# Patient Record
Sex: FEMALE | Race: WHITE | Employment: OTHER | URBAN - METROPOLITAN AREA
[De-identification: names, ages, dates, MRNs, and addresses within clinical notes are randomized per-mention and may not be internally consistent; named-entity substitution may affect disease eponyms.]

---

## 2017-05-22 ENCOUNTER — OFFICE VISIT (OUTPATIENT)
Dept: INTERNAL MEDICINE CLINIC | Facility: CLINIC | Age: 77
End: 2017-05-22

## 2017-05-22 VITALS
WEIGHT: 172 LBS | HEART RATE: 60 BPM | HEIGHT: 65 IN | TEMPERATURE: 98 F | RESPIRATION RATE: 16 BRPM | SYSTOLIC BLOOD PRESSURE: 110 MMHG | DIASTOLIC BLOOD PRESSURE: 50 MMHG | BODY MASS INDEX: 28.66 KG/M2

## 2017-05-22 DIAGNOSIS — Z00.00 ROUTINE GENERAL MEDICAL EXAMINATION AT A HEALTH CARE FACILITY: ICD-10-CM

## 2017-05-22 DIAGNOSIS — M79.10 MYALGIA: ICD-10-CM

## 2017-05-22 DIAGNOSIS — Z12.31 ENCOUNTER FOR SCREENING MAMMOGRAM FOR BREAST CANCER: ICD-10-CM

## 2017-05-22 DIAGNOSIS — M35.3 POLYMYALGIA RHEUMATICA SYNDROME (HCC): ICD-10-CM

## 2017-05-22 DIAGNOSIS — Z12.11 SPECIAL SCREENING FOR MALIGNANT NEOPLASMS, COLON: ICD-10-CM

## 2017-05-22 DIAGNOSIS — Z13.220 SCREENING FOR CHOLESTEROL LEVEL: ICD-10-CM

## 2017-05-22 DIAGNOSIS — Z78.0 POST-MENOPAUSAL: Primary | ICD-10-CM

## 2017-05-22 PROCEDURE — G0439 PPPS, SUBSEQ VISIT: HCPCS | Performed by: INTERNAL MEDICINE

## 2017-05-22 PROCEDURE — 90670 PCV13 VACCINE IM: CPT | Performed by: INTERNAL MEDICINE

## 2017-05-22 PROCEDURE — 96160 PT-FOCUSED HLTH RISK ASSMT: CPT | Performed by: INTERNAL MEDICINE

## 2017-05-22 PROCEDURE — G0009 ADMIN PNEUMOCOCCAL VACCINE: HCPCS | Performed by: INTERNAL MEDICINE

## 2017-05-22 PROCEDURE — 99212 OFFICE O/P EST SF 10 MIN: CPT | Performed by: INTERNAL MEDICINE

## 2017-05-23 NOTE — PROGRESS NOTES
HPI:    Patient ID: Christine Tidwell is a 68year old female.     HPI  Here for awv, no acute complaints, doesn't want to do cscope, due for luigi, has heme and rheum notes with her from [place], myalgias better, htn, taking and tolerating meds  /50 regular rhythm and normal heart sounds. Pulmonary/Chest: Effort normal and breath sounds normal. She has no wheezes. Breast exam done shows no masses, no nipple dc, no axillary LAD       Abdominal: Soft. Musculoskeletal: She exhibits no edema.    Chad

## 2017-06-12 RX ORDER — NEBIVOLOL HYDROCHLORIDE 10 MG/1
TABLET ORAL
Qty: 90 TABLET | Refills: 1 | Status: SHIPPED | OUTPATIENT
Start: 2017-06-12 | End: 2017-11-16

## 2017-07-03 ENCOUNTER — LAB ENCOUNTER (OUTPATIENT)
Dept: LAB | Facility: HOSPITAL | Age: 77
End: 2017-07-03
Attending: INTERNAL MEDICINE
Payer: MEDICARE

## 2017-07-03 DIAGNOSIS — M79.10 MYALGIA: ICD-10-CM

## 2017-07-03 DIAGNOSIS — Z13.220 SCREENING FOR CHOLESTEROL LEVEL: ICD-10-CM

## 2017-07-03 LAB
ALBUMIN SERPL-MCNC: 3.6 G/DL (ref 3.5–4.8)
ALP LIVER SERPL-CCNC: 60 U/L (ref 55–142)
ALT SERPL-CCNC: 19 U/L (ref 14–54)
AST SERPL-CCNC: 18 U/L (ref 15–41)
BASOPHILS # BLD AUTO: 0.06 X10(3) UL (ref 0–0.1)
BASOPHILS NFR BLD AUTO: 0.7 %
BILIRUB SERPL-MCNC: 1.3 MG/DL (ref 0.1–2)
BUN BLD-MCNC: 15 MG/DL (ref 8–20)
CALCIUM BLD-MCNC: 8.8 MG/DL (ref 8.3–10.3)
CHLORIDE: 104 MMOL/L (ref 101–111)
CHOLEST SMN-MCNC: 189 MG/DL (ref ?–200)
CO2: 28 MMOL/L (ref 22–32)
CREAT BLD-MCNC: 0.7 MG/DL (ref 0.55–1.02)
EOSINOPHIL # BLD AUTO: 0.6 X10(3) UL (ref 0–0.3)
EOSINOPHIL NFR BLD AUTO: 7.2 %
ERYTHROCYTE [DISTWIDTH] IN BLOOD BY AUTOMATED COUNT: 19.8 % (ref 11.5–16)
GLUCOSE BLD-MCNC: 83 MG/DL (ref 70–99)
HCT VFR BLD AUTO: 32.8 % (ref 34–50)
HDLC SERPL-MCNC: 67 MG/DL (ref 45–?)
HDLC SERPL: 2.82 {RATIO} (ref ?–4.44)
HGB BLD-MCNC: 10.7 G/DL (ref 12–16)
IMMATURE GRANULOCYTE COUNT: 0.04 X10(3) UL (ref 0–1)
IMMATURE GRANULOCYTE RATIO %: 0.5 %
LDLC SERPL CALC-MCNC: 101 MG/DL (ref ?–130)
LYMPHOCYTES # BLD AUTO: 2.75 X10(3) UL (ref 0.9–4)
LYMPHOCYTES NFR BLD AUTO: 33.2 %
M PROTEIN MFR SERPL ELPH: 7.7 G/DL (ref 6.1–8.3)
MCH RBC QN AUTO: 32.2 PG (ref 27–33.2)
MCHC RBC AUTO-ENTMCNC: 32.6 G/DL (ref 31–37)
MCV RBC AUTO: 98.8 FL (ref 81–100)
MONOCYTES # BLD AUTO: 0.66 X10(3) UL (ref 0.1–0.6)
MONOCYTES NFR BLD AUTO: 8 %
NEUTROPHIL ABS PRELIM: 4.18 X10 (3) UL (ref 1.3–6.7)
NEUTROPHILS # BLD AUTO: 4.18 X10(3) UL (ref 1.3–6.7)
NEUTROPHILS NFR BLD AUTO: 50.4 %
NONHDLC SERPL-MCNC: 122 MG/DL (ref ?–130)
PLATELET # BLD AUTO: 295 10(3)UL (ref 150–450)
PLATELET MORPHOLOGY: NORMAL
POTASSIUM SERPL-SCNC: 4.1 MMOL/L (ref 3.6–5.1)
RBC # BLD AUTO: 3.32 X10(6)UL (ref 3.8–5.1)
RED CELL DISTRIBUTION WIDTH-SD: 71.3 FL (ref 35.1–46.3)
SED RATE-ML: 109 MM/HR (ref 0–25)
SODIUM SERPL-SCNC: 139 MMOL/L (ref 136–144)
TRIGLYCERIDES: 104 MG/DL (ref ?–150)
TSI SER-ACNC: 1.71 MIU/ML (ref 0.35–5.5)
VLDL: 21 MG/DL (ref 5–40)
WBC # BLD AUTO: 8.3 X10(3) UL (ref 4–13)

## 2017-07-03 PROCEDURE — 85652 RBC SED RATE AUTOMATED: CPT

## 2017-07-03 PROCEDURE — 84443 ASSAY THYROID STIM HORMONE: CPT

## 2017-07-03 PROCEDURE — 80053 COMPREHEN METABOLIC PANEL: CPT

## 2017-07-03 PROCEDURE — 36415 COLL VENOUS BLD VENIPUNCTURE: CPT

## 2017-07-03 PROCEDURE — 80061 LIPID PANEL: CPT

## 2017-07-03 PROCEDURE — 85025 COMPLETE CBC W/AUTO DIFF WBC: CPT

## 2017-07-13 ENCOUNTER — HOSPITAL ENCOUNTER (OUTPATIENT)
Dept: BONE DENSITY | Age: 77
Discharge: HOME | End: 2017-07-13
Attending: INTERNAL MEDICINE
Payer: MEDICARE

## 2017-07-13 ENCOUNTER — HOSPITAL ENCOUNTER (OUTPATIENT)
Dept: MAMMOGRAPHY | Age: 77
Discharge: HOME | End: 2017-07-13
Attending: INTERNAL MEDICINE
Payer: MEDICARE

## 2017-07-13 DIAGNOSIS — Z78.0 POST-MENOPAUSAL: ICD-10-CM

## 2017-07-13 DIAGNOSIS — Z12.31 ENCOUNTER FOR SCREENING MAMMOGRAM FOR BREAST CANCER: ICD-10-CM

## 2017-07-13 PROCEDURE — 77067 SCR MAMMO BI INCL CAD: CPT | Performed by: INTERNAL MEDICINE

## 2017-07-13 PROCEDURE — 77080 DXA BONE DENSITY AXIAL: CPT | Performed by: INTERNAL MEDICINE

## 2017-08-18 ENCOUNTER — OFFICE VISIT (OUTPATIENT)
Dept: HEMATOLOGY/ONCOLOGY | Facility: HOSPITAL | Age: 77
End: 2017-08-18
Attending: INTERNAL MEDICINE
Payer: MEDICARE

## 2017-08-18 VITALS
BODY MASS INDEX: 29.02 KG/M2 | SYSTOLIC BLOOD PRESSURE: 171 MMHG | TEMPERATURE: 98 F | HEIGHT: 65 IN | HEART RATE: 69 BPM | WEIGHT: 174.19 LBS | OXYGEN SATURATION: 95 % | DIASTOLIC BLOOD PRESSURE: 64 MMHG | RESPIRATION RATE: 18 BRPM

## 2017-08-18 DIAGNOSIS — D64.9 ANEMIA, UNSPECIFIED TYPE: Primary | ICD-10-CM

## 2017-08-18 DIAGNOSIS — R70.0 ELEVATED SED RATE: ICD-10-CM

## 2017-08-18 LAB
BASOPHILS # BLD AUTO: 0.03 X10(3) UL (ref 0–0.1)
BASOPHILS NFR BLD AUTO: 0.4 %
EOSINOPHIL # BLD AUTO: 0.49 X10(3) UL (ref 0–0.3)
EOSINOPHIL NFR BLD AUTO: 6.2 %
ERYTHROCYTE [DISTWIDTH] IN BLOOD BY AUTOMATED COUNT: 19.9 % (ref 11.5–16)
FOLATE (FOLIC ACID), SERUM: 31.6 NG/ML (ref 8.7–24)
FREE T4: 0.9 NG/DL (ref 0.9–1.8)
HAV AB SERPL IA-ACNC: 474 PG/ML (ref 193–986)
HCT VFR BLD AUTO: 32.2 % (ref 34–50)
HGB BLD-MCNC: 10.5 G/DL (ref 12–16)
IMMATURE GRANULOCYTE COUNT: 0.02 X10(3) UL (ref 0–1)
IMMATURE GRANULOCYTE RATIO %: 0.3 %
IRON SATURATION: 27 % (ref 13–45)
IRON: 117 UG/DL (ref 28–170)
LYMPHOCYTES # BLD AUTO: 2.44 X10(3) UL (ref 0.9–4)
LYMPHOCYTES NFR BLD AUTO: 30.8 %
MCH RBC QN AUTO: 32.5 PG (ref 27–33.2)
MCHC RBC AUTO-ENTMCNC: 32.6 G/DL (ref 31–37)
MCV RBC AUTO: 99.7 FL (ref 81–100)
MONOCYTES # BLD AUTO: 0.66 X10(3) UL (ref 0.1–0.6)
MONOCYTES NFR BLD AUTO: 8.3 %
NEUTROPHIL ABS PRELIM: 4.27 X10 (3) UL (ref 1.3–6.7)
NEUTROPHILS # BLD AUTO: 4.27 X10(3) UL (ref 1.3–6.7)
NEUTROPHILS NFR BLD AUTO: 54 %
PLATELET # BLD AUTO: 304 10(3)UL (ref 150–450)
PLATELET MORPHOLOGY: NORMAL
RBC # BLD AUTO: 3.23 X10(6)UL (ref 3.8–5.1)
RED CELL DISTRIBUTION WIDTH-SD: 72.8 FL (ref 35.1–46.3)
SED RATE-ML: 100 MM/HR (ref 0–25)
TOTAL IRON BINDING CAPACITY: 426 UG/DL (ref 298–536)
TRANSFERRIN: 286 MG/DL (ref 200–360)
TSI SER-ACNC: 1.15 MIU/ML (ref 0.35–5.5)
WBC # BLD AUTO: 7.9 X10(3) UL (ref 4–13)

## 2017-08-18 PROCEDURE — 99205 OFFICE O/P NEW HI 60 MIN: CPT | Performed by: INTERNAL MEDICINE

## 2017-08-18 NOTE — PROGRESS NOTES
Patient is here for MD consult for abnormal labs. Reports sed rate is elevated. Referred by Dr Elida Ellis. Pt reports legs are achy and c/o feeling tired a lot.        Education Record    Learner:  Patient and Spouse    Disease / Diagnosis: consult     Abraham Erickson

## 2017-08-18 NOTE — CONSULTS
Cancer Center Report of Consultation    Patient Name: Alta Bruce   YOB: 1940   Medical Record Number: [id number]   CSN: [id number]   Consulting Physician: Jerilyn Friend M.D.    Referring Physician: Kira Muñoz    Date stability of her hgb. She is back for the summer. She reports that she feels tired, but believes that it is related to her age. No F/C/NS. No palpable LAD. No wt loss. No family history of anemia.     Past Medical History:  Past Medical History: OR), Take by mouth., Disp: , Rfl:   •  Naproxen Sodium (ALEVE OR), Take by mouth., Disp: , Rfl:   •  Irbesartan 150 MG Oral Tab, Take 1 tablet (150 mg total) by mouth nightly., Disp: 90 tablet, Rfl: 3  •  RESTASIS 0.05 % Ophthalmic Emulsion, Place 1 drop i icterus. Pupils are reactive and equal.   ENMT Normal - Sinuses are nontender. No oral exudates, ulcers, masses, thrush or mucositis. Oropharynx clear. Tongue normal.   Neck Normal - Supple without masses or thyromegaly.    Hematologic/Lymphatic Normal - (H)   Prelim Neutrophil Abs Latest Ref Range: 1.30 - 6.70 x10 (3) uL 3.74 4.18 4.27   Neutrophils Absolute Latest Ref Range: 1.30 - 6.70 x10(3) uL 3.74 4.18 4.27   Lymphocytes Absolute Latest Ref Range: 0.90 - 4.00 x10(3) uL 2.29 2.75 2.44   Monocytes Abso 70 - 99 mg/dL 83   Sodium Latest Ref Range: 136 - 144 mmol/L 139   Potassium Latest Ref Range: 3.6 - 5.1 mmol/L 4.1   Chloride Latest Ref Range: 101 - 111 mmol/L 104   Carbon Dioxide, Total Latest Ref Range: 22.0 - 32.0 mmol/L 28.0   BUN Latest Ref Range:

## 2017-08-21 RX ORDER — IRBESARTAN 150 MG/1
TABLET ORAL
Qty: 90 TABLET | Refills: 2 | Status: SHIPPED | OUTPATIENT
Start: 2017-08-21 | End: 2017-09-21 | Stop reason: ALTCHOICE

## 2017-08-25 ENCOUNTER — LAB ENCOUNTER (OUTPATIENT)
Dept: LAB | Facility: HOSPITAL | Age: 77
End: 2017-08-25
Attending: INTERNAL MEDICINE
Payer: MEDICARE

## 2017-08-25 DIAGNOSIS — Z12.11 SCREENING FOR COLON CANCER: ICD-10-CM

## 2017-08-25 PROCEDURE — 82272 OCCULT BLD FECES 1-3 TESTS: CPT

## 2017-09-01 ENCOUNTER — TELEPHONE (OUTPATIENT)
Dept: INTERNAL MEDICINE CLINIC | Facility: CLINIC | Age: 77
End: 2017-09-01

## 2017-09-01 DIAGNOSIS — Z12.11 SCREENING FOR COLON CANCER: Primary | ICD-10-CM

## 2017-09-01 NOTE — TELEPHONE ENCOUNTER
I see Lilly Irwin gave stool card at visit in May, order placed. Carlie at [facility] lab made aware.

## 2017-09-20 ENCOUNTER — TELEPHONE (OUTPATIENT)
Dept: INTERNAL MEDICINE CLINIC | Facility: CLINIC | Age: 77
End: 2017-09-20

## 2017-09-21 ENCOUNTER — OFFICE VISIT (OUTPATIENT)
Dept: INTERNAL MEDICINE CLINIC | Facility: CLINIC | Age: 77
End: 2017-09-21

## 2017-09-21 VITALS
DIASTOLIC BLOOD PRESSURE: 76 MMHG | RESPIRATION RATE: 16 BRPM | SYSTOLIC BLOOD PRESSURE: 140 MMHG | TEMPERATURE: 98 F | WEIGHT: 175 LBS | HEART RATE: 72 BPM | BODY MASS INDEX: 29 KG/M2

## 2017-09-21 DIAGNOSIS — I10 ESSENTIAL HYPERTENSION: Primary | ICD-10-CM

## 2017-09-21 PROCEDURE — 99213 OFFICE O/P EST LOW 20 MIN: CPT | Performed by: PHYSICIAN ASSISTANT

## 2017-09-21 RX ORDER — IRBESARTAN AND HYDROCHLOROTHIAZIDE 150; 12.5 MG/1; MG/1
1 TABLET, FILM COATED ORAL DAILY
Qty: 30 TABLET | Refills: 0 | Status: SHIPPED | OUTPATIENT
Start: 2017-09-21 | End: 2017-10-09

## 2017-09-21 NOTE — PROGRESS NOTES
Patient presents with:  Blood Pressure: per patient has been going up and down       HPI:   Pt presents for follow up of BP. Reports it has been fluctuating so she stopped checking it at home. Was high at recent visit with Heme Jewelene Hatchet).   Pt reports c Normal rate, regular rhythm. No murmur, rubs or gallops. Pulmonary/Chest: Effort normal and breath sounds normal. No respiratory distress. No wheezes, rhonchi or rales  Skin: Skin is warm and dry. No rash noted. No erythema. No pallor.        A/P:    Ess

## 2017-10-04 ENCOUNTER — LAB ENCOUNTER (OUTPATIENT)
Dept: LAB | Facility: HOSPITAL | Age: 77
End: 2017-10-04
Attending: PHYSICIAN ASSISTANT
Payer: MEDICARE

## 2017-10-04 DIAGNOSIS — I10 ESSENTIAL HYPERTENSION: ICD-10-CM

## 2017-10-04 PROCEDURE — 36415 COLL VENOUS BLD VENIPUNCTURE: CPT

## 2017-10-04 PROCEDURE — 80048 BASIC METABOLIC PNL TOTAL CA: CPT

## 2017-10-09 ENCOUNTER — OFFICE VISIT (OUTPATIENT)
Dept: INTERNAL MEDICINE CLINIC | Facility: CLINIC | Age: 77
End: 2017-10-09

## 2017-10-09 VITALS
DIASTOLIC BLOOD PRESSURE: 62 MMHG | RESPIRATION RATE: 17 BRPM | TEMPERATURE: 99 F | OXYGEN SATURATION: 98 % | BODY MASS INDEX: 28.86 KG/M2 | WEIGHT: 173.19 LBS | SYSTOLIC BLOOD PRESSURE: 130 MMHG | HEIGHT: 65 IN | HEART RATE: 60 BPM

## 2017-10-09 DIAGNOSIS — I10 ESSENTIAL HYPERTENSION: Primary | ICD-10-CM

## 2017-10-09 DIAGNOSIS — R70.0 ELEVATED SED RATE: ICD-10-CM

## 2017-10-09 PROCEDURE — 99213 OFFICE O/P EST LOW 20 MIN: CPT | Performed by: INTERNAL MEDICINE

## 2017-10-09 RX ORDER — IRBESARTAN AND HYDROCHLOROTHIAZIDE 300; 12.5 MG/1; MG/1
1 TABLET, FILM COATED ORAL DAILY
Qty: 30 TABLET | Refills: 2 | Status: SHIPPED | OUTPATIENT
Start: 2017-10-09 | End: 2017-11-13

## 2017-10-09 NOTE — PROGRESS NOTES
HPI:    Patient ID: Shaheen Irwin is a 68year old female.     HPI  Here for htn, occ some bp's high at home, high sed rate, was told to fu with rheum after worse sed rate in July, hasnt gone, heme note reviewed, was turned down for Brooks visit, h Placed This Encounter      Basic Metabolic Panel (8) [E]      Sed Rate, Westergren (Automated) [E]    Meds This Visit:  Signed Prescriptions Disp Refills    Irbesartan-Hydrochlorothiazide 300-12.5 MG Oral Tab 30 tablet 2      Sig: Take 1 tablet by mouth da

## 2017-10-13 ENCOUNTER — TELEPHONE (OUTPATIENT)
Dept: INTERNAL MEDICINE CLINIC | Facility: CLINIC | Age: 77
End: 2017-10-13

## 2017-10-19 NOTE — TELEPHONE ENCOUNTER
Received copy of [facility] labs collected on 3/24/17 ordered by provider Dr. David leonardo in [facility] also includes notes from office visit on 2/13/17 with oncologist.

## 2017-11-07 ENCOUNTER — LAB ENCOUNTER (OUTPATIENT)
Dept: LAB | Age: 77
End: 2017-11-07
Attending: INTERNAL MEDICINE
Payer: MEDICARE

## 2017-11-07 DIAGNOSIS — R70.0 ELEVATED SED RATE: ICD-10-CM

## 2017-11-07 DIAGNOSIS — I10 ESSENTIAL HYPERTENSION: ICD-10-CM

## 2017-11-07 PROCEDURE — 80048 BASIC METABOLIC PNL TOTAL CA: CPT | Performed by: INTERNAL MEDICINE

## 2017-11-07 PROCEDURE — 85652 RBC SED RATE AUTOMATED: CPT | Performed by: INTERNAL MEDICINE

## 2017-11-13 ENCOUNTER — OFFICE VISIT (OUTPATIENT)
Dept: INTERNAL MEDICINE CLINIC | Facility: CLINIC | Age: 77
End: 2017-11-13

## 2017-11-13 VITALS
WEIGHT: 175 LBS | OXYGEN SATURATION: 98 % | HEART RATE: 68 BPM | BODY MASS INDEX: 29.16 KG/M2 | SYSTOLIC BLOOD PRESSURE: 126 MMHG | HEIGHT: 65 IN | DIASTOLIC BLOOD PRESSURE: 58 MMHG | RESPIRATION RATE: 17 BRPM

## 2017-11-13 DIAGNOSIS — I10 ESSENTIAL HYPERTENSION: Primary | ICD-10-CM

## 2017-11-13 DIAGNOSIS — R70.0 ELEVATED SED RATE: ICD-10-CM

## 2017-11-13 PROCEDURE — 99213 OFFICE O/P EST LOW 20 MIN: CPT | Performed by: INTERNAL MEDICINE

## 2017-11-13 RX ORDER — IRBESARTAN AND HYDROCHLOROTHIAZIDE 300; 12.5 MG/1; MG/1
1 TABLET, FILM COATED ORAL DAILY
Qty: 90 TABLET | Refills: 2 | Status: SHIPPED | OUTPATIENT
Start: 2017-11-13 | End: 2019-04-23

## 2017-11-13 NOTE — PROGRESS NOTES
HPI:    Patient ID: Cece Boogie is a 68year old female.     HPI  Here for htn, fu after med change and elevated sed rate, follows with rheum in [place], feels ok  /58   Pulse 68   Resp 17   Ht 65\"   Wt 175 lb   SpO2 98%   BMI 29.12 kg/m² diagnosis)-cont current meds, mild hyponatremia, fu in spring  Hyponatremia-mild, bp controlled with meds which include hctz, cont for now  Elevated sed rate-will contact [place] rheumatologist     No orders of the defined types were placed in this encounter.

## 2017-11-16 RX ORDER — NEBIVOLOL HYDROCHLORIDE 10 MG/1
TABLET ORAL
Qty: 90 TABLET | Refills: 0 | Status: SHIPPED | OUTPATIENT
Start: 2017-11-16 | End: 2018-02-15

## 2018-02-15 RX ORDER — NEBIVOLOL HYDROCHLORIDE 10 MG/1
TABLET ORAL
Qty: 90 TABLET | Refills: 0 | Status: SHIPPED | OUTPATIENT
Start: 2018-02-15 | End: 2018-05-27

## 2018-05-18 ENCOUNTER — OFFICE VISIT (OUTPATIENT)
Dept: INTERNAL MEDICINE CLINIC | Facility: CLINIC | Age: 78
End: 2018-05-18

## 2018-05-18 VITALS
WEIGHT: 172 LBS | TEMPERATURE: 98 F | SYSTOLIC BLOOD PRESSURE: 114 MMHG | BODY MASS INDEX: 28.66 KG/M2 | RESPIRATION RATE: 16 BRPM | HEIGHT: 65 IN | HEART RATE: 56 BPM | DIASTOLIC BLOOD PRESSURE: 60 MMHG

## 2018-05-18 DIAGNOSIS — F41.9 ANXIETY: ICD-10-CM

## 2018-05-18 DIAGNOSIS — Z12.31 ENCOUNTER FOR SCREENING MAMMOGRAM FOR BREAST CANCER: ICD-10-CM

## 2018-05-18 DIAGNOSIS — M35.3 POLYMYALGIA RHEUMATICA SYNDROME (HCC): ICD-10-CM

## 2018-05-18 DIAGNOSIS — I10 ESSENTIAL HYPERTENSION: Primary | ICD-10-CM

## 2018-05-18 DIAGNOSIS — R01.1 HEART MURMUR: ICD-10-CM

## 2018-05-18 PROCEDURE — 99213 OFFICE O/P EST LOW 20 MIN: CPT | Performed by: INTERNAL MEDICINE

## 2018-05-18 RX ORDER — ALPRAZOLAM 0.25 MG/1
0.25 TABLET ORAL 2 TIMES DAILY PRN
Qty: 4 TABLET | Refills: 0 | Status: SHIPPED | OUTPATIENT
Start: 2018-05-18 | End: 2019-10-25

## 2018-05-20 NOTE — PROGRESS NOTES
HPI:    Patient ID: Bang Patient is a 68year old female.     HPI  Here for htn, pmr followed by doc in [facility], she will get labs from that doc for me, anxiety with flying,   /60 (BP Location: Right arm, Patient Position: Sitting, Cuff Size: a oriented to person, place, and time. Skin: She is not diaphoretic. Psychiatric: She has a normal mood and affect.               ASSESSMENT/PLAN:   Essential hypertension  (primary encounter diagnosis)-cont meds, at goal  Polymyalgia rheumatica syndrome

## 2018-05-27 RX ORDER — NEBIVOLOL HYDROCHLORIDE 10 MG/1
TABLET ORAL
Qty: 90 TABLET | Refills: 0 | Status: SHIPPED | OUTPATIENT
Start: 2018-05-27 | End: 2018-09-11

## 2018-06-01 ENCOUNTER — LAB ENCOUNTER (OUTPATIENT)
Dept: LAB | Facility: HOSPITAL | Age: 78
End: 2018-06-01
Attending: INTERNAL MEDICINE
Payer: MEDICARE

## 2018-06-01 DIAGNOSIS — M35.3 POLYMYALGIA RHEUMATICA SYNDROME (HCC): ICD-10-CM

## 2018-06-01 DIAGNOSIS — I10 ESSENTIAL HYPERTENSION: ICD-10-CM

## 2018-06-01 DIAGNOSIS — F41.9 ANXIETY: ICD-10-CM

## 2018-06-01 PROCEDURE — 85025 COMPLETE CBC W/AUTO DIFF WBC: CPT

## 2018-06-01 PROCEDURE — 80053 COMPREHEN METABOLIC PANEL: CPT

## 2018-06-01 PROCEDURE — 84443 ASSAY THYROID STIM HORMONE: CPT

## 2018-06-01 PROCEDURE — 36415 COLL VENOUS BLD VENIPUNCTURE: CPT

## 2018-06-01 PROCEDURE — 85652 RBC SED RATE AUTOMATED: CPT

## 2018-06-04 ENCOUNTER — TELEPHONE (OUTPATIENT)
Dept: INTERNAL MEDICINE CLINIC | Facility: CLINIC | Age: 78
End: 2018-06-04

## 2018-07-20 PROBLEM — M35.00 SJOGREN'S SYNDROME WITHOUT EXTRAGLANDULAR INVOLVEMENT (HCC): Status: ACTIVE | Noted: 2018-07-20

## 2018-08-10 ENCOUNTER — HOSPITAL ENCOUNTER (OUTPATIENT)
Dept: MAMMOGRAPHY | Age: 78
Discharge: HOME | End: 2018-08-10
Attending: INTERNAL MEDICINE
Payer: MEDICARE

## 2018-08-10 DIAGNOSIS — Z12.31 ENCOUNTER FOR SCREENING MAMMOGRAM FOR BREAST CANCER: ICD-10-CM

## 2018-08-10 PROCEDURE — 77063 BREAST TOMOSYNTHESIS BI: CPT | Performed by: INTERNAL MEDICINE

## 2018-08-10 PROCEDURE — 77067 SCR MAMMO BI INCL CAD: CPT | Performed by: INTERNAL MEDICINE

## 2018-09-05 ENCOUNTER — OFFICE VISIT (OUTPATIENT)
Dept: INTERNAL MEDICINE CLINIC | Facility: CLINIC | Age: 78
End: 2018-09-05
Payer: MEDICARE

## 2018-09-05 VITALS
SYSTOLIC BLOOD PRESSURE: 116 MMHG | HEIGHT: 65 IN | RESPIRATION RATE: 16 BRPM | BODY MASS INDEX: 27.99 KG/M2 | DIASTOLIC BLOOD PRESSURE: 50 MMHG | HEART RATE: 64 BPM | TEMPERATURE: 98 F | WEIGHT: 168 LBS

## 2018-09-05 DIAGNOSIS — E78.2 MIXED HYPERLIPIDEMIA: ICD-10-CM

## 2018-09-05 DIAGNOSIS — R53.83 FATIGUE, UNSPECIFIED TYPE: ICD-10-CM

## 2018-09-05 DIAGNOSIS — M85.80 OSTEOPENIA, UNSPECIFIED LOCATION: ICD-10-CM

## 2018-09-05 DIAGNOSIS — M35.00 SICCA SYNDROME (HCC): ICD-10-CM

## 2018-09-05 DIAGNOSIS — Z00.00 ROUTINE GENERAL MEDICAL EXAMINATION AT A HEALTH CARE FACILITY: Primary | ICD-10-CM

## 2018-09-05 DIAGNOSIS — Z12.11 SPECIAL SCREENING FOR MALIGNANT NEOPLASMS, COLON: ICD-10-CM

## 2018-09-05 DIAGNOSIS — R91.1 LUNG NODULE: ICD-10-CM

## 2018-09-05 DIAGNOSIS — D64.9 ANEMIA, UNSPECIFIED TYPE: ICD-10-CM

## 2018-09-05 DIAGNOSIS — I10 ESSENTIAL HYPERTENSION: ICD-10-CM

## 2018-09-05 DIAGNOSIS — D18.03 HEPATIC HEMANGIOMA: ICD-10-CM

## 2018-09-05 DIAGNOSIS — E55.9 VITAMIN D DEFICIENCY: ICD-10-CM

## 2018-09-05 DIAGNOSIS — M35.00 SJOGREN'S SYNDROME WITHOUT EXTRAGLANDULAR INVOLVEMENT (HCC): ICD-10-CM

## 2018-09-05 DIAGNOSIS — D12.6 BENIGN NEOPLASM OF COLON, UNSPECIFIED PART OF COLON: ICD-10-CM

## 2018-09-05 PROCEDURE — 96160 PT-FOCUSED HLTH RISK ASSMT: CPT | Performed by: INTERNAL MEDICINE

## 2018-09-05 PROCEDURE — G0439 PPPS, SUBSEQ VISIT: HCPCS | Performed by: INTERNAL MEDICINE

## 2018-09-05 RX ORDER — ACETAMINOPHEN 325 MG/1
325 TABLET ORAL EVERY 6 HOURS PRN
COMMUNITY

## 2018-09-05 NOTE — PROGRESS NOTES
HPI:    Patient ID: Christine Tidwell is a 68year old female.     HPI  Here for AHA, feels well, chronic anemia, intermittant mild fatigue, rheumatology note reviewed, htn, dry eyes followed by optho, June labs rev iewed, active, ho heart murmur, ec well-developed and well-nourished. HENT:   Head: Normocephalic and atraumatic. Right Ear: External ear normal.   Left Ear: External ear normal.   Mouth/Throat: No oropharyngeal exudate.    Eyes: Conjunctivae are normal. Pupils are equal, round, and reac [E]      Lipid Panel [E]      Vitamin D, 25-Hydroxy [E]      Occult Blood, Fecal, Immunoassay [E]    Meds This Visit:  No prescriptions requested or ordered in this encounter       Imaging & Referrals:  None       #[id number]

## 2018-09-11 RX ORDER — NEBIVOLOL 10 MG/1
10 TABLET ORAL
Qty: 90 TABLET | Refills: 0 | Status: SHIPPED | OUTPATIENT
Start: 2018-09-11 | End: 2018-11-23

## 2018-09-17 ENCOUNTER — APPOINTMENT (OUTPATIENT)
Dept: LAB | Facility: HOSPITAL | Age: 78
End: 2018-09-17
Attending: INTERNAL MEDICINE
Payer: MEDICARE

## 2018-09-17 DIAGNOSIS — Z12.11 SPECIAL SCREENING FOR MALIGNANT NEOPLASMS, COLON: ICD-10-CM

## 2018-09-17 PROCEDURE — 82274 ASSAY TEST FOR BLOOD FECAL: CPT

## 2018-09-21 ENCOUNTER — HOSPITAL ENCOUNTER (OUTPATIENT)
Dept: CV DIAGNOSTICS | Facility: HOSPITAL | Age: 78
Discharge: HOME | End: 2018-09-21
Attending: INTERNAL MEDICINE
Payer: MEDICARE

## 2018-09-21 DIAGNOSIS — R01.1 HEART MURMUR: ICD-10-CM

## 2018-09-21 PROCEDURE — 93306 TTE W/DOPPLER COMPLETE: CPT | Performed by: INTERNAL MEDICINE

## 2018-10-05 ENCOUNTER — LAB ENCOUNTER (OUTPATIENT)
Dept: LAB | Age: 78
End: 2018-10-05
Attending: INTERNAL MEDICINE
Payer: MEDICARE

## 2018-10-05 DIAGNOSIS — I10 ESSENTIAL HYPERTENSION: ICD-10-CM

## 2018-10-05 DIAGNOSIS — E55.9 VITAMIN D DEFICIENCY: ICD-10-CM

## 2018-10-05 DIAGNOSIS — D64.9 ANEMIA, UNSPECIFIED TYPE: ICD-10-CM

## 2018-10-05 PROCEDURE — 85025 COMPLETE CBC W/AUTO DIFF WBC: CPT

## 2018-10-05 PROCEDURE — 82728 ASSAY OF FERRITIN: CPT

## 2018-10-05 PROCEDURE — 80061 LIPID PANEL: CPT

## 2018-10-05 PROCEDURE — 83540 ASSAY OF IRON: CPT

## 2018-10-05 PROCEDURE — 85045 AUTOMATED RETICULOCYTE COUNT: CPT

## 2018-10-05 PROCEDURE — 82306 VITAMIN D 25 HYDROXY: CPT

## 2018-10-05 PROCEDURE — 83550 IRON BINDING TEST: CPT

## 2018-10-06 ENCOUNTER — LAB ENCOUNTER (OUTPATIENT)
Dept: LAB | Facility: HOSPITAL | Age: 78
End: 2018-10-06
Attending: INTERNAL MEDICINE
Payer: MEDICARE

## 2018-10-06 DIAGNOSIS — Z12.11 SCREEN FOR COLON CANCER: Primary | ICD-10-CM

## 2018-10-06 PROCEDURE — 82274 ASSAY TEST FOR BLOOD FECAL: CPT

## 2018-10-12 ENCOUNTER — TELEPHONE (OUTPATIENT)
Dept: INTERNAL MEDICINE CLINIC | Facility: CLINIC | Age: 78
End: 2018-10-12

## 2018-10-12 DIAGNOSIS — D64.9 ANEMIA, UNSPECIFIED TYPE: Primary | ICD-10-CM

## 2018-10-23 ENCOUNTER — OFFICE VISIT (OUTPATIENT)
Dept: INTERNAL MEDICINE CLINIC | Facility: CLINIC | Age: 78
End: 2018-10-23
Payer: MEDICARE

## 2018-10-23 VITALS
SYSTOLIC BLOOD PRESSURE: 130 MMHG | WEIGHT: 167 LBS | HEART RATE: 64 BPM | DIASTOLIC BLOOD PRESSURE: 68 MMHG | TEMPERATURE: 98 F | BODY MASS INDEX: 27.82 KG/M2 | HEIGHT: 65 IN | RESPIRATION RATE: 16 BRPM | OXYGEN SATURATION: 96 %

## 2018-10-23 DIAGNOSIS — M62.838 CERVICAL PARASPINAL MUSCLE SPASM: Primary | ICD-10-CM

## 2018-10-23 PROCEDURE — 99213 OFFICE O/P EST LOW 20 MIN: CPT | Performed by: INTERNAL MEDICINE

## 2018-10-23 RX ORDER — CYCLOBENZAPRINE HCL 10 MG
10 TABLET ORAL 2 TIMES DAILY PRN
Qty: 14 TABLET | Refills: 0 | Status: SHIPPED | OUTPATIENT
Start: 2018-10-23 | End: 2018-10-30

## 2018-10-23 NOTE — PROGRESS NOTES
Suki Contreras  9/18/1940    Patient presents with:  Pain: patient c/o neck pain and right shoulder pain since yesterday      SUBJECTIVE   Suki Contreras is a 66year old female who presents with shoulder pain.     The patient was in her No Known Allergies   Past Medical History:   Diagnosis Date   • ANEMIA    • Normal cardiac stress test 9/12    at [facility]   • Other and unspecified hyperlipidemia    • PMR (polymyalgia rheumatica) (HCC)    • Sicca syndrome (HCC)    • Unspecified essential suggestive history (trauma, etc...) to warrant imaging at this time  Prescribe muscle relaxant therapy to be taken prior to sleeping only.   The patient was advised to avoid medication use within 8 hours of driving or ambulating up and down stairs, and verb

## 2018-10-24 ENCOUNTER — TELEPHONE (OUTPATIENT)
Dept: INTERNAL MEDICINE CLINIC | Facility: CLINIC | Age: 78
End: 2018-10-24

## 2018-10-24 NOTE — TELEPHONE ENCOUNTER
Contacted [facility] to complete PA- indicating PA DENIED- No documentation noted for initiation of PA? Reason for denial is pt age over 72. Exceptions would be if pt has muscle skeletal condition or some sort of muscle spasm.    Per AD OV notes 10/23/

## 2018-10-24 NOTE — TELEPHONE ENCOUNTER
Justine Adame called and stated that there are clinical questions that need to be answered in regards to the PA for the pts medication Cyclobenzaprine HCl 10 MG Oral Tab    Please advise they need a call back from the nurse     Case number to reference #[id number]

## 2018-10-26 NOTE — TELEPHONE ENCOUNTER
Fax received from [facility] that PA for Cyclobenzaprine has been approved. CASE ID: [id number] effective 01/01/2018 - 10/24/2019.     Tel: [phone]

## 2018-11-24 RX ORDER — NEBIVOLOL HYDROCHLORIDE 10 MG/1
TABLET ORAL
Qty: 90 TABLET | Refills: 1 | Status: SHIPPED | OUTPATIENT
Start: 2018-11-24 | End: 2019-06-24

## 2019-01-21 ENCOUNTER — TELEPHONE (OUTPATIENT)
Dept: INTERNAL MEDICINE CLINIC | Facility: CLINIC | Age: 79
End: 2019-01-21

## 2019-01-21 NOTE — TELEPHONE ENCOUNTER
Pts  called and stated that pt is having shoulder pain and would like a referral for PT in [facility] .      Please advise

## 2019-01-21 NOTE — TELEPHONE ENCOUNTER
Called pt's , Mildred [facility] per HIPAA). He states this is a new pain for pt and she did not injure herself either.   Both pt and  are in [place] and stated pt was seen in a local urgent care and prescribed Naproxen for her pain - Mildred Tracy states

## 2019-01-23 NOTE — TELEPHONE ENCOUNTER
Juan Cordero MD   You 13 hours ago (6:26 PM)      I can call him tomorrow when I am in office    Routing Comment

## 2019-04-23 RX ORDER — IRBESARTAN AND HYDROCHLOROTHIAZIDE 300; 12.5 MG/1; MG/1
TABLET, FILM COATED ORAL
Qty: 90 TABLET | Refills: 0 | Status: SHIPPED | OUTPATIENT
Start: 2019-04-23 | End: 2019-12-03

## 2019-04-25 NOTE — TELEPHONE ENCOUNTER
Pt called back and scheduled BP F/U   Future Appointments   Date Time Provider Johanne Mckeon   5/8/2019  7:45 AM Jennifer Chandler MD [facility]

## 2019-05-08 ENCOUNTER — OFFICE VISIT (OUTPATIENT)
Dept: INTERNAL MEDICINE CLINIC | Facility: CLINIC | Age: 79
End: 2019-05-08
Payer: MEDICARE

## 2019-05-08 ENCOUNTER — TELEPHONE (OUTPATIENT)
Dept: INTERNAL MEDICINE CLINIC | Facility: CLINIC | Age: 79
End: 2019-05-08

## 2019-05-08 VITALS
BODY MASS INDEX: 26.99 KG/M2 | DIASTOLIC BLOOD PRESSURE: 68 MMHG | WEIGHT: 162 LBS | TEMPERATURE: 98 F | RESPIRATION RATE: 16 BRPM | SYSTOLIC BLOOD PRESSURE: 128 MMHG | HEART RATE: 64 BPM | HEIGHT: 65 IN

## 2019-05-08 DIAGNOSIS — L98.9 SKIN LESION OF FACE: ICD-10-CM

## 2019-05-08 DIAGNOSIS — E78.2 MIXED HYPERLIPIDEMIA: ICD-10-CM

## 2019-05-08 DIAGNOSIS — Z00.00 ROUTINE GENERAL MEDICAL EXAMINATION AT A HEALTH CARE FACILITY: Primary | ICD-10-CM

## 2019-05-08 DIAGNOSIS — I10 ESSENTIAL HYPERTENSION: ICD-10-CM

## 2019-05-08 DIAGNOSIS — I10 ESSENTIAL HYPERTENSION: Primary | ICD-10-CM

## 2019-05-08 PROCEDURE — 99213 OFFICE O/P EST LOW 20 MIN: CPT | Performed by: INTERNAL MEDICINE

## 2019-05-08 NOTE — PROGRESS NOTES
HPI:    Patient ID: Akila Matthews is a 66year old female.     HPI  Here for htn and hyperchol, co changing skin lesion on her face, due for pe in sept  /68 (BP Location: Right arm, Patient Position: Sitting, Cuff Size: adult)   Pulse 64 meds  Skin lesion of face-to Dr. Deirdre Anderson    No orders of the defined types were placed in this encounter.       Meds This Visit:  Requested Prescriptions      No prescriptions requested or ordered in this encounter       Imaging & Referrals:  [facility]

## 2019-05-08 NOTE — TELEPHONE ENCOUNTER
Future Appointments   Date Time Provider Johanne Woodardi   9/6/2019  8:30 AM Karen Vaz MD [facility]     Orders to edward- Pt aware to fast-no call back required Awake

## 2019-06-19 ENCOUNTER — OFFICE VISIT (OUTPATIENT)
Dept: SURGERY | Facility: CLINIC | Age: 79
End: 2019-06-19
Payer: MEDICARE

## 2019-06-19 VITALS — HEIGHT: 65 IN | WEIGHT: 160.63 LBS | BODY MASS INDEX: 26.76 KG/M2

## 2019-06-19 DIAGNOSIS — L98.9 SKIN LESION: Primary | ICD-10-CM

## 2019-06-19 PROCEDURE — 99203 OFFICE O/P NEW LOW 30 MIN: CPT | Performed by: SURGERY

## 2019-06-19 NOTE — CONSULTS
New Patient Consultation    Chief Complaint:  Patient presents with:  Consult: Lesion on face       History of Present Illness:   Natasha Contreras is a 66year old female who is seen for s suspicious skin lesion on her L cheek.   She has noted this (Other) Mother         osteoporosis   • Other (Other) Sister         osteoporosis   • Heart Disorder Maternal Grandmother         MI         Social History:  Social History    Socioeconomic History      Marital status:       Spouse name: Not on file rash  Gastrointestinal:   There is no history of difficulty or pain with swallowing, reflux symptoms, nausea, vomiting, dark/ bloody stools, diarrhea, constipation,  change in bowel habits, or abdominal pain.    Genitourinary:  The patient denies frequent u Cardiovascular: no cyanosis, no edema    Lymphatic:  There is no cervical, supraclavicular, or axillary lymphadenopathy appreciated.     Musculoskeletal: Extremities unremarkable, without edema, tenderness or deformities        Impression:   Alexandra White A

## 2019-06-24 RX ORDER — NEBIVOLOL HYDROCHLORIDE 10 MG/1
TABLET ORAL
Qty: 90 TABLET | Refills: 1 | Status: SHIPPED | OUTPATIENT
Start: 2019-06-24 | End: 2019-12-13

## 2019-06-24 NOTE — TELEPHONE ENCOUNTER
Last Ov: 5/8/19, JL, F/U  Upcoming appt: 9/6/19  Last labs: CBC, Retic count, Iron & TIBC, Ferritin, lipid, Vit D, RBC morph 97/3/41  Last Rx: bystolic 43YW, #80, 1 R      Per Protocol, fail. Pt due for cmp/bmp. Rx pending, please sign if appropriate.

## 2019-08-21 ENCOUNTER — OFFICE VISIT (OUTPATIENT)
Dept: SURGERY | Facility: CLINIC | Age: 79
End: 2019-08-21
Payer: MEDICARE

## 2019-08-21 VITALS — WEIGHT: 161 LBS | BODY MASS INDEX: 27.49 KG/M2 | HEIGHT: 64.25 IN

## 2019-08-21 DIAGNOSIS — L98.9 SKIN LESION: Primary | ICD-10-CM

## 2019-08-21 PROCEDURE — 99213 OFFICE O/P EST LOW 20 MIN: CPT | Performed by: SURGERY

## 2019-08-21 RX ORDER — AMINO ACIDS/MV,IRON,MIN
2 TABLET ORAL DAILY
COMMUNITY

## 2019-08-21 NOTE — CONSULTS
Estabilshed Patient Consultation    Chief Complaint: skin lesion L cheek  History of Present Illness:   Baljit Contreras is a 66year old female who is seen for s suspicious skin lesion on her L cheek.   She has noted this lesion for over a month an (Other) Sister         osteoporosis   • Heart Disorder Maternal Grandmother         MI         Social History:    Alcohol use No         Smoking status: Never Smoker   Smokeless tobacco: Never Used         Drug use: No           Review of Systems:    The p

## 2019-08-29 ENCOUNTER — LAB ENCOUNTER (OUTPATIENT)
Dept: LAB | Age: 79
End: 2019-08-29
Attending: INTERNAL MEDICINE
Payer: MEDICARE

## 2019-08-29 DIAGNOSIS — E78.2 MIXED HYPERLIPIDEMIA: ICD-10-CM

## 2019-08-29 DIAGNOSIS — Z00.00 ROUTINE GENERAL MEDICAL EXAMINATION AT A HEALTH CARE FACILITY: ICD-10-CM

## 2019-08-29 DIAGNOSIS — I10 ESSENTIAL HYPERTENSION: ICD-10-CM

## 2019-08-29 LAB
ALBUMIN SERPL-MCNC: 3.4 G/DL (ref 3.4–5)
ALBUMIN/GLOB SERPL: 0.8 {RATIO} (ref 1–2)
ALP LIVER SERPL-CCNC: 65 U/L (ref 55–142)
ALT SERPL-CCNC: 20 U/L (ref 13–56)
ANION GAP SERPL CALC-SCNC: 7 MMOL/L (ref 0–18)
AST SERPL-CCNC: 18 U/L (ref 15–37)
BASOPHILS # BLD AUTO: 0.03 X10(3) UL (ref 0–0.2)
BASOPHILS NFR BLD AUTO: 0.5 %
BILIRUB SERPL-MCNC: 1.2 MG/DL (ref 0.1–2)
BUN BLD-MCNC: 15 MG/DL (ref 7–18)
BUN/CREAT SERPL: 22.1 (ref 10–20)
CALCIUM BLD-MCNC: 9.3 MG/DL (ref 8.5–10.1)
CHLORIDE SERPL-SCNC: 106 MMOL/L (ref 98–112)
CHOLEST SMN-MCNC: 183 MG/DL (ref ?–200)
CO2 SERPL-SCNC: 28 MMOL/L (ref 21–32)
CREAT BLD-MCNC: 0.68 MG/DL (ref 0.55–1.02)
DEPRECATED RDW RBC AUTO: 82.3 FL (ref 35.1–46.3)
EOSINOPHIL # BLD AUTO: 0.32 X10(3) UL (ref 0–0.7)
EOSINOPHIL NFR BLD AUTO: 4.9 %
ERYTHROCYTE [DISTWIDTH] IN BLOOD BY AUTOMATED COUNT: 22.1 % (ref 11–15)
GLOBULIN PLAS-MCNC: 4.1 G/DL (ref 2.8–4.4)
GLUCOSE BLD-MCNC: 84 MG/DL (ref 70–99)
HCT VFR BLD AUTO: 31.1 % (ref 35–48)
HDLC SERPL-MCNC: 65 MG/DL (ref 40–59)
HGB BLD-MCNC: 9.9 G/DL (ref 12–16)
IMM GRANULOCYTES # BLD AUTO: 0.02 X10(3) UL (ref 0–1)
IMM GRANULOCYTES NFR BLD: 0.3 %
LDLC SERPL CALC-MCNC: 107 MG/DL (ref ?–100)
LYMPHOCYTES # BLD AUTO: 1.98 X10(3) UL (ref 1–4)
LYMPHOCYTES NFR BLD AUTO: 30.5 %
M PROTEIN MFR SERPL ELPH: 7.5 G/DL (ref 6.4–8.2)
MCH RBC QN AUTO: 31.7 PG (ref 26–34)
MCHC RBC AUTO-ENTMCNC: 31.8 G/DL (ref 31–37)
MCV RBC AUTO: 99.7 FL (ref 80–100)
MONOCYTES # BLD AUTO: 0.6 X10(3) UL (ref 0.1–1)
MONOCYTES NFR BLD AUTO: 9.2 %
NEUTROPHILS # BLD AUTO: 3.55 X10 (3) UL (ref 1.5–7.7)
NEUTROPHILS # BLD AUTO: 3.55 X10(3) UL (ref 1.5–7.7)
NEUTROPHILS NFR BLD AUTO: 54.6 %
NONHDLC SERPL-MCNC: 118 MG/DL (ref ?–130)
OSMOLALITY SERPL CALC.SUM OF ELEC: 292 MOSM/KG (ref 275–295)
PLATELET # BLD AUTO: 314 10(3)UL (ref 150–450)
POTASSIUM SERPL-SCNC: 3.9 MMOL/L (ref 3.5–5.1)
RBC # BLD AUTO: 3.12 X10(6)UL (ref 3.8–5.3)
SODIUM SERPL-SCNC: 141 MMOL/L (ref 136–145)
TRIGL SERPL-MCNC: 56 MG/DL (ref 30–149)
VLDLC SERPL CALC-MCNC: 11 MG/DL (ref 0–30)
WBC # BLD AUTO: 6.5 X10(3) UL (ref 4–11)

## 2019-08-29 PROCEDURE — 80053 COMPREHEN METABOLIC PANEL: CPT

## 2019-08-29 PROCEDURE — 85025 COMPLETE CBC W/AUTO DIFF WBC: CPT

## 2019-08-29 PROCEDURE — 80061 LIPID PANEL: CPT

## 2019-09-06 ENCOUNTER — TELEPHONE (OUTPATIENT)
Dept: INTERNAL MEDICINE CLINIC | Facility: CLINIC | Age: 79
End: 2019-09-06

## 2019-09-06 ENCOUNTER — OFFICE VISIT (OUTPATIENT)
Dept: INTERNAL MEDICINE CLINIC | Facility: CLINIC | Age: 79
End: 2019-09-06
Payer: MEDICARE

## 2019-09-06 VITALS
BODY MASS INDEX: 27.83 KG/M2 | DIASTOLIC BLOOD PRESSURE: 50 MMHG | TEMPERATURE: 98 F | SYSTOLIC BLOOD PRESSURE: 110 MMHG | HEIGHT: 63.78 IN | RESPIRATION RATE: 16 BRPM | WEIGHT: 161 LBS | HEART RATE: 64 BPM

## 2019-09-06 DIAGNOSIS — L98.9 SKIN LESION: ICD-10-CM

## 2019-09-06 DIAGNOSIS — M85.80 OSTEOPENIA, UNSPECIFIED LOCATION: ICD-10-CM

## 2019-09-06 DIAGNOSIS — I10 ESSENTIAL HYPERTENSION: ICD-10-CM

## 2019-09-06 DIAGNOSIS — D18.03 HEPATIC HEMANGIOMA: Primary | ICD-10-CM

## 2019-09-06 DIAGNOSIS — Z12.31 ENCOUNTER FOR SCREENING MAMMOGRAM FOR BREAST CANCER: ICD-10-CM

## 2019-09-06 DIAGNOSIS — M35.3 POLYMYALGIA RHEUMATICA SYNDROME (HCC): ICD-10-CM

## 2019-09-06 DIAGNOSIS — R91.1 LUNG NODULE: ICD-10-CM

## 2019-09-06 DIAGNOSIS — E55.9 VITAMIN D DEFICIENCY: ICD-10-CM

## 2019-09-06 DIAGNOSIS — M35.00 SICCA SYNDROME (HCC): ICD-10-CM

## 2019-09-06 DIAGNOSIS — D12.6 BENIGN NEOPLASM OF COLON, UNSPECIFIED PART OF COLON: ICD-10-CM

## 2019-09-06 DIAGNOSIS — Z12.11 SPECIAL SCREENING FOR MALIGNANT NEOPLASMS, COLON: ICD-10-CM

## 2019-09-06 DIAGNOSIS — D64.9 ANEMIA, UNSPECIFIED TYPE: ICD-10-CM

## 2019-09-06 DIAGNOSIS — M35.00 SJOGREN'S SYNDROME WITHOUT EXTRAGLANDULAR INVOLVEMENT (HCC): ICD-10-CM

## 2019-09-06 DIAGNOSIS — R53.83 FATIGUE, UNSPECIFIED TYPE: ICD-10-CM

## 2019-09-06 DIAGNOSIS — E78.2 MIXED HYPERLIPIDEMIA: ICD-10-CM

## 2019-09-06 DIAGNOSIS — Z00.00 ROUTINE GENERAL MEDICAL EXAMINATION AT A HEALTH CARE FACILITY: ICD-10-CM

## 2019-09-06 PROCEDURE — 99397 PER PM REEVAL EST PAT 65+ YR: CPT | Performed by: INTERNAL MEDICINE

## 2019-09-06 PROCEDURE — G0008 ADMIN INFLUENZA VIRUS VAC: HCPCS | Performed by: INTERNAL MEDICINE

## 2019-09-06 PROCEDURE — 90662 IIV NO PRSV INCREASED AG IM: CPT | Performed by: INTERNAL MEDICINE

## 2019-09-06 PROCEDURE — G0439 PPPS, SUBSEQ VISIT: HCPCS | Performed by: INTERNAL MEDICINE

## 2019-09-06 PROCEDURE — 96160 PT-FOCUSED HLTH RISK ASSMT: CPT | Performed by: INTERNAL MEDICINE

## 2019-09-06 RX ORDER — SERTRALINE HYDROCHLORIDE 25 MG/1
25 TABLET, FILM COATED ORAL DAILY
Qty: 30 TABLET | Refills: 1 | Status: SHIPPED | OUTPATIENT
Start: 2019-09-06 | End: 2020-06-04

## 2019-09-06 NOTE — PROGRESS NOTES
HPI:    Patient ID: Christiana Blanchard is a 66year old female.     HPI  Here for MAP apt, co anxiety and wont fly any more because she feels claustraphobic, no cp or sob, chart review shows she never got fu ct as ordered (was reminded with certified Disp:  Rfl:    ALPRAZolam (XANAX) 0.25 MG Oral Tab Take 1 tablet (0.25 mg total) by mouth 2 (two) times daily as needed for Sleep. Take one tablet 30 min before flight Disp: 4 tablet Rfl: 0   Naproxen Sodium (ALEVE OR) Take by mouth.  Disp:  Rfl:    RESTASI her rheum in [place], stable as no myalgias  Sjogren's syndrome without extraglandular involvement (hcc)-sees rheum, some dry mouth  Special screening for malignant neoplasms, colon-home with stool cards  Anemia, unspecified type-sees heme, check labs as not or

## 2019-09-16 ENCOUNTER — PATIENT OUTREACH (OUTPATIENT)
Dept: CASE MANAGEMENT | Age: 79
End: 2019-09-16

## 2019-09-30 NOTE — PROGRESS NOTES
S/w pt regarding CCM program pt states she will think about enrolling and will call back. Mailed letter to patient.

## 2019-10-16 ENCOUNTER — LAB ENCOUNTER (OUTPATIENT)
Dept: LAB | Age: 79
End: 2019-10-16
Attending: INTERNAL MEDICINE
Payer: MEDICARE

## 2019-10-16 DIAGNOSIS — D64.9 ANEMIA, UNSPECIFIED TYPE: ICD-10-CM

## 2019-10-16 PROCEDURE — 83540 ASSAY OF IRON: CPT

## 2019-10-16 PROCEDURE — 85045 AUTOMATED RETICULOCYTE COUNT: CPT

## 2019-10-16 PROCEDURE — 82728 ASSAY OF FERRITIN: CPT

## 2019-10-16 PROCEDURE — 85025 COMPLETE CBC W/AUTO DIFF WBC: CPT

## 2019-10-16 PROCEDURE — 85652 RBC SED RATE AUTOMATED: CPT

## 2019-10-16 PROCEDURE — 82274 ASSAY TEST FOR BLOOD FECAL: CPT

## 2019-10-18 ENCOUNTER — APPOINTMENT (OUTPATIENT)
Dept: LAB | Age: 79
End: 2019-10-18
Attending: INTERNAL MEDICINE
Payer: MEDICARE

## 2019-10-18 DIAGNOSIS — Z12.11 SPECIAL SCREENING FOR MALIGNANT NEOPLASMS, COLON: ICD-10-CM

## 2019-10-25 ENCOUNTER — OFFICE VISIT (OUTPATIENT)
Dept: INTERNAL MEDICINE CLINIC | Facility: CLINIC | Age: 79
End: 2019-10-25
Payer: MEDICARE

## 2019-10-25 VITALS
BODY MASS INDEX: 27.66 KG/M2 | OXYGEN SATURATION: 94 % | RESPIRATION RATE: 14 BRPM | SYSTOLIC BLOOD PRESSURE: 122 MMHG | HEART RATE: 60 BPM | HEIGHT: 64 IN | DIASTOLIC BLOOD PRESSURE: 58 MMHG | TEMPERATURE: 98 F | WEIGHT: 162 LBS

## 2019-10-25 DIAGNOSIS — F41.9 ANXIETY: ICD-10-CM

## 2019-10-25 DIAGNOSIS — R70.0 ELEVATED SED RATE: Primary | ICD-10-CM

## 2019-10-25 DIAGNOSIS — R00.2 PALPITATIONS: ICD-10-CM

## 2019-10-25 PROCEDURE — 93000 ELECTROCARDIOGRAM COMPLETE: CPT | Performed by: INTERNAL MEDICINE

## 2019-10-25 PROCEDURE — 99213 OFFICE O/P EST LOW 20 MIN: CPT | Performed by: INTERNAL MEDICINE

## 2019-10-25 RX ORDER — ALPRAZOLAM 0.25 MG/1
TABLET ORAL
Qty: 6 TABLET | Refills: 0 | Status: SHIPPED | OUTPATIENT
Start: 2019-10-25 | End: 2020-06-04

## 2019-10-25 NOTE — PROGRESS NOTES
HPI:    Patient ID: Tess Watts is a 78year old female.     HPI  Here, sed rate again elevated, I have recommended seeing specialist here, she agains declines and wants to see specialist in [facility], I did express concern, anxiety is better and pt s PHYSICAL EXAM:   Physical Exam   Constitutional: She is oriented to person, place, and time. She appears well-developed and well-nourished. HENT:   Head: Normocephalic and atraumatic.    Cardiovascular: Normal rate, regular rhythm and normal heart sound

## 2019-10-28 ENCOUNTER — TELEPHONE (OUTPATIENT)
Dept: INTERNAL MEDICINE CLINIC | Facility: CLINIC | Age: 79
End: 2019-10-28

## 2019-10-28 NOTE — TELEPHONE ENCOUNTER
LM for patient that labs are ok and her sed rate is much lower, no further orders from Dr. Cornelio Vora.   Also that if she had any further questions to call back and we would keep this message open until end of day 10-29 then close it assuming she had no furt

## 2019-11-15 ENCOUNTER — OFFICE VISIT (OUTPATIENT)
Dept: HEMATOLOGY/ONCOLOGY | Facility: HOSPITAL | Age: 79
End: 2019-11-15
Attending: INTERNAL MEDICINE
Payer: MEDICARE

## 2019-11-15 VITALS
TEMPERATURE: 98 F | OXYGEN SATURATION: 96 % | SYSTOLIC BLOOD PRESSURE: 138 MMHG | RESPIRATION RATE: 18 BRPM | WEIGHT: 159 LBS | HEART RATE: 61 BPM | DIASTOLIC BLOOD PRESSURE: 52 MMHG | BODY MASS INDEX: 27 KG/M2

## 2019-11-15 DIAGNOSIS — D64.9 ANEMIA, UNSPECIFIED TYPE: Primary | ICD-10-CM

## 2019-11-15 PROCEDURE — 99213 OFFICE O/P EST LOW 20 MIN: CPT | Performed by: INTERNAL MEDICINE

## 2019-11-15 NOTE — PROGRESS NOTES
Education Record    Learner:  Patient  Barriers / Limitations:  Language  Method:  Discussion  Outcome:  Needs reinforcement-slight language barrier. Comments:    Last seen in 2017. Pt goes to [place] in the winter.  Pt had labs done on 10/16 and 10/25 wit
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
## 2019-11-16 ENCOUNTER — HOSPITAL ENCOUNTER (OUTPATIENT)
Dept: CV DIAGNOSTICS | Facility: HOSPITAL | Age: 79
Discharge: HOME | End: 2019-11-16
Attending: INTERNAL MEDICINE
Payer: MEDICARE

## 2019-11-16 DIAGNOSIS — R00.2 PALPITATIONS: ICD-10-CM

## 2019-11-16 PROCEDURE — 93225 XTRNL ECG REC<48 HRS REC: CPT | Performed by: INTERNAL MEDICINE

## 2019-11-16 PROCEDURE — 93226 XTRNL ECG REC<48 HR SCAN A/R: CPT | Performed by: INTERNAL MEDICINE

## 2019-11-16 PROCEDURE — 93227 XTRNL ECG REC<48 HR R&I: CPT | Performed by: INTERNAL MEDICINE

## 2019-12-03 RX ORDER — IRBESARTAN AND HYDROCHLOROTHIAZIDE 300; 12.5 MG/1; MG/1
TABLET, FILM COATED ORAL
Qty: 90 TABLET | Refills: 0 | Status: SHIPPED | OUTPATIENT
Start: 2019-12-03 | End: 2020-04-08

## 2019-12-14 RX ORDER — NEBIVOLOL HYDROCHLORIDE 10 MG/1
TABLET ORAL
Qty: 90 TABLET | Refills: 0 | Status: SHIPPED | OUTPATIENT
Start: 2019-12-14 | End: 2020-05-20

## 2020-04-04 ENCOUNTER — PATIENT MESSAGE (OUTPATIENT)
Dept: INTERNAL MEDICINE CLINIC | Facility: CLINIC | Age: 80
End: 2020-04-04

## 2020-04-08 RX ORDER — IRBESARTAN AND HYDROCHLOROTHIAZIDE 300; 12.5 MG/1; MG/1
TABLET, FILM COATED ORAL
Qty: 90 TABLET | Refills: 3 | Status: SHIPPED | OUTPATIENT
Start: 2020-04-08 | End: 2020-05-20 | Stop reason: ALTCHOICE

## 2020-05-13 ENCOUNTER — TELEPHONE (OUTPATIENT)
Dept: INTERNAL MEDICINE CLINIC | Facility: CLINIC | Age: 80
End: 2020-05-13

## 2020-05-13 NOTE — TELEPHONE ENCOUNTER
Ok to see as we need to address this, do I have the ability to schedule for 30 min in office that time?

## 2020-05-13 NOTE — TELEPHONE ENCOUNTER
Pt wants to see JL for fup from illness when she was in [place]-poss lupus? ? She has test results to bring with her to discuss-she is sched to see Junior Patterson in office on 5/20 but she is over 60-ok w/JL to have her come in or r/s to a later date due to her age?  Call t

## 2020-05-20 ENCOUNTER — OFFICE VISIT (OUTPATIENT)
Dept: INTERNAL MEDICINE CLINIC | Facility: CLINIC | Age: 80
End: 2020-05-20
Payer: MEDICARE

## 2020-05-20 VITALS
HEIGHT: 64 IN | WEIGHT: 146 LBS | DIASTOLIC BLOOD PRESSURE: 54 MMHG | RESPIRATION RATE: 16 BRPM | BODY MASS INDEX: 24.92 KG/M2 | TEMPERATURE: 97 F | HEART RATE: 60 BPM | SYSTOLIC BLOOD PRESSURE: 134 MMHG

## 2020-05-20 DIAGNOSIS — Z12.31 ENCOUNTER FOR SCREENING MAMMOGRAM FOR BREAST CANCER: ICD-10-CM

## 2020-05-20 DIAGNOSIS — R63.4 WEIGHT LOSS: ICD-10-CM

## 2020-05-20 DIAGNOSIS — E78.2 MIXED HYPERLIPIDEMIA: ICD-10-CM

## 2020-05-20 DIAGNOSIS — I10 ESSENTIAL HYPERTENSION: Primary | ICD-10-CM

## 2020-05-20 PROCEDURE — 3075F SYST BP GE 130 - 139MM HG: CPT | Performed by: INTERNAL MEDICINE

## 2020-05-20 PROCEDURE — 3008F BODY MASS INDEX DOCD: CPT | Performed by: INTERNAL MEDICINE

## 2020-05-20 PROCEDURE — 3078F DIAST BP <80 MM HG: CPT | Performed by: INTERNAL MEDICINE

## 2020-05-20 PROCEDURE — 99214 OFFICE O/P EST MOD 30 MIN: CPT | Performed by: INTERNAL MEDICINE

## 2020-05-20 RX ORDER — NEBIVOLOL 2.5 MG/1
2.5 TABLET ORAL DAILY
Qty: 30 TABLET | Refills: 1 | Status: SHIPPED | OUTPATIENT
Start: 2020-05-20 | End: 2020-07-17

## 2020-05-20 RX ORDER — SERTRALINE HYDROCHLORIDE 25 MG/1
25 TABLET, FILM COATED ORAL DAILY
Qty: 30 TABLET | Refills: 1 | Status: SHIPPED | OUTPATIENT
Start: 2020-05-20 | End: 2020-06-04

## 2020-05-20 RX ORDER — MELATONIN
325
COMMUNITY
End: 2020-08-04

## 2020-05-20 RX ORDER — IRBESARTAN 300 MG/1
300 TABLET ORAL NIGHTLY
COMMUNITY
End: 2021-02-01

## 2020-05-20 RX ORDER — LORAZEPAM 1 MG/1
0.5 TABLET ORAL AS NEEDED
COMMUNITY
End: 2020-12-28 | Stop reason: ALTCHOICE

## 2020-05-21 ENCOUNTER — HOSPITAL ENCOUNTER (OUTPATIENT)
Dept: GENERAL RADIOLOGY | Age: 80
Discharge: HOME | End: 2020-05-21
Attending: INTERNAL MEDICINE
Payer: MEDICARE

## 2020-05-21 ENCOUNTER — HOSPITAL ENCOUNTER (OUTPATIENT)
Dept: MAMMOGRAPHY | Age: 80
Discharge: HOME | End: 2020-05-21
Attending: INTERNAL MEDICINE
Payer: MEDICARE

## 2020-05-21 DIAGNOSIS — Z12.31 ENCOUNTER FOR SCREENING MAMMOGRAM FOR BREAST CANCER: ICD-10-CM

## 2020-05-21 DIAGNOSIS — R63.4 WEIGHT LOSS: ICD-10-CM

## 2020-05-21 PROCEDURE — 71046 X-RAY EXAM CHEST 2 VIEWS: CPT | Performed by: INTERNAL MEDICINE

## 2020-05-21 PROCEDURE — 77063 BREAST TOMOSYNTHESIS BI: CPT | Performed by: INTERNAL MEDICINE

## 2020-05-21 PROCEDURE — 77067 SCR MAMMO BI INCL CAD: CPT | Performed by: INTERNAL MEDICINE

## 2020-05-22 ENCOUNTER — LAB ENCOUNTER (OUTPATIENT)
Dept: LAB | Facility: HOSPITAL | Age: 80
End: 2020-05-22
Attending: INTERNAL MEDICINE
Payer: MEDICARE

## 2020-05-22 DIAGNOSIS — R63.4 WEIGHT LOSS: ICD-10-CM

## 2020-05-22 DIAGNOSIS — D64.9 ANEMIA, UNSPECIFIED TYPE: ICD-10-CM

## 2020-05-22 PROCEDURE — 80053 COMPREHEN METABOLIC PANEL: CPT

## 2020-05-22 PROCEDURE — 83550 IRON BINDING TEST: CPT

## 2020-05-22 PROCEDURE — 82728 ASSAY OF FERRITIN: CPT

## 2020-05-22 PROCEDURE — 82607 VITAMIN B-12: CPT

## 2020-05-22 PROCEDURE — 85045 AUTOMATED RETICULOCYTE COUNT: CPT

## 2020-05-22 PROCEDURE — 85025 COMPLETE CBC W/AUTO DIFF WBC: CPT

## 2020-05-22 PROCEDURE — 83540 ASSAY OF IRON: CPT

## 2020-05-22 PROCEDURE — 85652 RBC SED RATE AUTOMATED: CPT

## 2020-05-22 PROCEDURE — 36415 COLL VENOUS BLD VENIPUNCTURE: CPT

## 2020-05-22 PROCEDURE — 81001 URINALYSIS AUTO W/SCOPE: CPT

## 2020-05-22 PROCEDURE — 86038 ANTINUCLEAR ANTIBODIES: CPT

## 2020-05-22 NOTE — PROGRESS NOTES
HPI:    Patient ID: Yohana Santillan is a 78year old female.     HPI  Pt here to fu \"illness\" in [place] with 25 lb wt loss, saw doc in [facility], told anemic and given iron, has followed with heme here, has chronically high sed rate but has declined ov wit hours as needed for Pain. • Naproxen Sodium (ALEVE OR) Take by mouth. • RESTASIS 0.05 % Ophthalmic Emulsion Place 1 drop into both eyes 2 (two) times daily.        • Cholecalciferol (VITAMIN D) 1000 UNIT Oral Cap 1 CAPSULE DAILY     • CENTRUM SILVER Sig: Take 1 tablet (2.5 mg total) by mouth daily.        Imaging & Referrals:  None       EO#[id number]

## 2020-05-26 ENCOUNTER — TELEPHONE (OUTPATIENT)
Dept: HEMATOLOGY/ONCOLOGY | Facility: HOSPITAL | Age: 80
End: 2020-05-26

## 2020-05-26 ENCOUNTER — TELEPHONE (OUTPATIENT)
Dept: INTERNAL MEDICINE CLINIC | Facility: CLINIC | Age: 80
End: 2020-05-26

## 2020-05-26 NOTE — TELEPHONE ENCOUNTER
Mauricio Saucedo MD  P Edw Pedro Wilcox Rns             She's due for Follow up with me, and her hgb is a bit lower. Please have her schedule an appointment. Pt informed. Appt scheduled for next week.

## 2020-05-26 NOTE — TELEPHONE ENCOUNTER
LOV-05/20/2020  Spoke with patient c/o chest pain-radiates to left face, dizziness, headache, heart palpitations, diarrhea, no other symptoms at this time. Patient indicates started taking Sertraline on 5/21/2020 and began having the above symptoms. No other symptoms at this time. Patient advised to proceed to ER for further evaluation. Patient verbalized understanding and agreeable to POC.    DEENA Oh

## 2020-06-04 ENCOUNTER — OFFICE VISIT (OUTPATIENT)
Dept: HEMATOLOGY/ONCOLOGY | Facility: HOSPITAL | Age: 80
End: 2020-06-04
Attending: INTERNAL MEDICINE
Payer: MEDICARE

## 2020-06-04 VITALS
DIASTOLIC BLOOD PRESSURE: 74 MMHG | RESPIRATION RATE: 18 BRPM | TEMPERATURE: 98 F | BODY MASS INDEX: 24 KG/M2 | WEIGHT: 139.5 LBS | SYSTOLIC BLOOD PRESSURE: 145 MMHG | OXYGEN SATURATION: 93 % | HEART RATE: 80 BPM

## 2020-06-04 DIAGNOSIS — D89.89 AUTOIMMUNE DISORDER (HCC): ICD-10-CM

## 2020-06-04 DIAGNOSIS — D63.8 ANEMIA, CHRONIC DISEASE: Primary | ICD-10-CM

## 2020-06-04 PROCEDURE — 99213 OFFICE O/P EST LOW 20 MIN: CPT | Performed by: INTERNAL MEDICINE
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
## 2020-06-04 NOTE — PROGRESS NOTES
HPI:    Patient ID: Arpita Honeycutt is a 78year old female.     HPI  Here for fu anemia or chronic dx, htn, wt loss, positive eric, seeing heme tomorrow, rheum next week, struggling with anxiety, didn't tolerate low dose sertraline, wants prn Irl Alpers Take 1 tablet (25 mg total) by mouth daily. (Patient not taking: Reported on 6/3/2020 ) 30 tablet 1   • Naproxen Sodium (ALEVE OR) Take by mouth.        Allergies:No Known Allergies   PHYSICAL EXAM:   Physical Exam   Constitutional: She is oriented to [facility]

## 2020-06-11 ENCOUNTER — LAB ENCOUNTER (OUTPATIENT)
Dept: LAB | Facility: HOSPITAL | Age: 80
End: 2020-06-11
Attending: INTERNAL MEDICINE
Payer: MEDICARE

## 2020-06-11 ENCOUNTER — OFFICE VISIT (OUTPATIENT)
Dept: RHEUMATOLOGY | Facility: CLINIC | Age: 80
End: 2020-06-11
Payer: MEDICARE

## 2020-06-11 VITALS
RESPIRATION RATE: 16 BRPM | SYSTOLIC BLOOD PRESSURE: 112 MMHG | TEMPERATURE: 98 F | BODY MASS INDEX: 24.74 KG/M2 | DIASTOLIC BLOOD PRESSURE: 50 MMHG | HEART RATE: 76 BPM | HEIGHT: 63 IN | WEIGHT: 139.63 LBS

## 2020-06-11 DIAGNOSIS — M15.9 PRIMARY OSTEOARTHRITIS INVOLVING MULTIPLE JOINTS: ICD-10-CM

## 2020-06-11 DIAGNOSIS — M35.00 SJOGREN'S SYNDROME WITHOUT EXTRAGLANDULAR INVOLVEMENT (HCC): Primary | ICD-10-CM

## 2020-06-11 DIAGNOSIS — R70.0 ELEVATED SED RATE: ICD-10-CM

## 2020-06-11 DIAGNOSIS — R26.9 ABNORMAL GAIT: ICD-10-CM

## 2020-06-11 DIAGNOSIS — D63.8 ANEMIA OF CHRONIC DISEASE: ICD-10-CM

## 2020-06-11 DIAGNOSIS — M35.3 PMR (POLYMYALGIA RHEUMATICA) (HCC): ICD-10-CM

## 2020-06-11 DIAGNOSIS — R76.8 POSITIVE ANA (ANTINUCLEAR ANTIBODY): ICD-10-CM

## 2020-06-11 DIAGNOSIS — Z87.39 HISTORY OF CALCIUM PYROPHOSPHATE DEPOSITION DISEASE (CPPD): ICD-10-CM

## 2020-06-11 DIAGNOSIS — M35.00 SJOGREN'S SYNDROME WITHOUT EXTRAGLANDULAR INVOLVEMENT (HCC): ICD-10-CM

## 2020-06-11 PROCEDURE — 86140 C-REACTIVE PROTEIN: CPT

## 2020-06-11 PROCEDURE — 85652 RBC SED RATE AUTOMATED: CPT

## 2020-06-11 PROCEDURE — 86160 COMPLEMENT ANTIGEN: CPT

## 2020-06-11 PROCEDURE — 99205 OFFICE O/P NEW HI 60 MIN: CPT | Performed by: INTERNAL MEDICINE

## 2020-06-11 PROCEDURE — 86235 NUCLEAR ANTIGEN ANTIBODY: CPT

## 2020-06-11 PROCEDURE — 86038 ANTINUCLEAR ANTIBODIES: CPT

## 2020-06-11 PROCEDURE — 36415 COLL VENOUS BLD VENIPUNCTURE: CPT

## 2020-06-11 NOTE — PROGRESS NOTES
RHEUMATOLOGY NEW PATIENT   Date of visit: 6/11/2020  ? Patient presents with:  Establish Care: new pt. Has seen Dr. Ludwin Pelletier 2 years ago and previously Dr. Anthony Brooks before that. Lab work in may shows MADELEINE+ and anemia of chronic inflamation.  Weight loss and lo nonspecific and could be related to malignancy more than PMR. She will follow up in 3 months or sooner as needed, but I will be in communication in the meantime. Patient and pt's daughter, Obdulia Yo, verbalized understanding of above instructions.  No fur is a 78year old female with the following active problems who is referred for rheumatologic evaluation due to elevated inflammatory markers. She has a hx of PMR and Sjogren's and has seen multiple rheumatologists over the years.  Pt does not recall details not worried about   + hx of one miscarriage- before 3 months  + hx of one child that  from complications of congential heart defect at 10 months.   + hx of easy bruising since childhood  + night sweats rarely   + hx of IBS; pt not interested in colon c mg by mouth nightly., Disp: , Rfl:   LORazepam 1 MG Oral Tab, Take 1 mg by mouth as needed for Anxiety. , Disp: , Rfl:   Nebivolol HCl 2.5 MG Oral Tab, Take 1 tablet (2.5 mg total) by mouth daily. , Disp: 30 tablet, Rfl: 1  Multiple Vitamins-Minerals (OCUVIT °C) BP: 112/50 Pulse: 76 Resp: 16     ? Current Weight Height BMI BSA Pain   Wt Readings from Last 1 Encounters:  06/11/20 : 139 lb 9.6 oz (63.3 kg)   Height: 63\" Body mass index is 24.73 kg/m². Body surface area is 1.66 meters squared.          Physical lost weight about 30lb. Patient still presents with dry cough which has not resolved since her sickness 3 months ago. No fever. FINDINGS:    LUNGS:  No focal consolidation. Normal vascularity. CARDIAC:  Normal size cardiac silhouette.   MEDIASTINUM: records  MADELEINE 1: 80 nuclear, speckled  Negative serum immunofixation electrophoresis, SPEP, thyroid peroxidase, thyroglobulin antibody, histone, C3, C4, Montes, dsDNA    10/2019  ESR 34     06/2018      12/2015  MADELEINE positive (no titer provided)    11/

## 2020-06-15 ENCOUNTER — TELEPHONE (OUTPATIENT)
Dept: RHEUMATOLOGY | Facility: CLINIC | Age: 80
End: 2020-06-15

## 2020-06-15 ENCOUNTER — TELEPHONE (OUTPATIENT)
Dept: HEMATOLOGY/ONCOLOGY | Facility: HOSPITAL | Age: 80
End: 2020-06-15

## 2020-06-15 DIAGNOSIS — M35.3 PMR (POLYMYALGIA RHEUMATICA) (HCC): Primary | ICD-10-CM

## 2020-06-15 DIAGNOSIS — R70.0 ELEVATED SED RATE: ICD-10-CM

## 2020-06-15 RX ORDER — METHYLPREDNISOLONE 8 MG/1
TABLET ORAL
Qty: 90 TABLET | Refills: 0 | Status: SHIPPED | OUTPATIENT
Start: 2020-06-15 | End: 2020-07-28

## 2020-06-15 NOTE — TELEPHONE ENCOUNTER
LVM for daughter to call back to discuss results. Also called pt and she requested that I speak to [facility]. Wil Shetty,   [facility]  6/15/2020    Patient's daughter returned my call. Discussed test results in detail.   MADELEINE still positive, lik

## 2020-06-15 NOTE — TELEPHONE ENCOUNTER
Grecia called saying Ivet Mendoza saw Emma Macias, and she would like to talk about next steps.  Please call

## 2020-06-16 ENCOUNTER — TELEPHONE (OUTPATIENT)
Dept: RHEUMATOLOGY | Facility: CLINIC | Age: 80
End: 2020-06-16

## 2020-06-29 ENCOUNTER — TELEPHONE (OUTPATIENT)
Dept: HEMATOLOGY/ONCOLOGY | Facility: HOSPITAL | Age: 80
End: 2020-06-29

## 2020-06-29 DIAGNOSIS — D63.8 ANEMIA OF CHRONIC ILLNESS: Primary | ICD-10-CM

## 2020-06-29 NOTE — TELEPHONE ENCOUNTER
Patient unsure of when Dr. Halle Zimmer would like to see her, she thinks he said 1-2 weeks and she need to know for sure.

## 2020-07-10 ENCOUNTER — LAB ENCOUNTER (OUTPATIENT)
Dept: LAB | Facility: HOSPITAL | Age: 80
End: 2020-07-10
Attending: INTERNAL MEDICINE
Payer: MEDICARE

## 2020-07-10 DIAGNOSIS — D63.8 ANEMIA OF CHRONIC ILLNESS: ICD-10-CM

## 2020-07-10 DIAGNOSIS — M35.3 PMR (POLYMYALGIA RHEUMATICA) (HCC): ICD-10-CM

## 2020-07-10 DIAGNOSIS — R70.0 ELEVATED SED RATE: ICD-10-CM

## 2020-07-10 LAB
BASOPHILS # BLD AUTO: 0.03 X10(3) UL (ref 0–0.2)
BASOPHILS NFR BLD AUTO: 0.3 %
CRP SERPL-MCNC: <0.29 MG/DL (ref ?–0.3)
DEPRECATED RDW RBC AUTO: 89.9 FL (ref 35.1–46.3)
EOSINOPHIL # BLD AUTO: 0 X10(3) UL (ref 0–0.7)
EOSINOPHIL NFR BLD AUTO: 0 %
ERYTHROCYTE [DISTWIDTH] IN BLOOD BY AUTOMATED COUNT: 24.6 % (ref 11–15)
HCT VFR BLD AUTO: 32.6 % (ref 35–48)
HGB BLD-MCNC: 10.5 G/DL (ref 12–16)
IMM GRANULOCYTES # BLD AUTO: 0.18 X10(3) UL (ref 0–1)
IMM GRANULOCYTES NFR BLD: 1.5 %
LYMPHOCYTES # BLD AUTO: 1.31 X10(3) UL (ref 1–4)
LYMPHOCYTES NFR BLD AUTO: 10.9 %
MCH RBC QN AUTO: 31.6 PG (ref 26–34)
MCHC RBC AUTO-ENTMCNC: 32.2 G/DL (ref 31–37)
MCV RBC AUTO: 98.2 FL (ref 80–100)
MONOCYTES # BLD AUTO: 0.47 X10(3) UL (ref 0.1–1)
MONOCYTES NFR BLD AUTO: 3.9 %
NEUTROPHILS # BLD AUTO: 9.99 X10 (3) UL (ref 1.5–7.7)
NEUTROPHILS # BLD AUTO: 9.99 X10(3) UL (ref 1.5–7.7)
NEUTROPHILS NFR BLD AUTO: 83.4 %
PLATELET # BLD AUTO: 334 10(3)UL (ref 150–450)
PLATELET MORPHOLOGY: NORMAL
RBC # BLD AUTO: 3.32 X10(6)UL (ref 3.8–5.3)
SED RATE-ML: 43 MM/HR (ref 0–25)
WBC # BLD AUTO: 12 X10(3) UL (ref 4–11)

## 2020-07-10 PROCEDURE — 36415 COLL VENOUS BLD VENIPUNCTURE: CPT

## 2020-07-10 PROCEDURE — 85652 RBC SED RATE AUTOMATED: CPT

## 2020-07-10 PROCEDURE — 86140 C-REACTIVE PROTEIN: CPT

## 2020-07-10 PROCEDURE — 85025 COMPLETE CBC W/AUTO DIFF WBC: CPT

## 2020-07-13 ENCOUNTER — TELEPHONE (OUTPATIENT)
Dept: RHEUMATOLOGY | Facility: CLINIC | Age: 80
End: 2020-07-13

## 2020-07-13 DIAGNOSIS — M35.3 PMR (POLYMYALGIA RHEUMATICA) (HCC): Primary | ICD-10-CM

## 2020-07-13 DIAGNOSIS — R70.0 ELEVATED SED RATE: ICD-10-CM

## 2020-07-13 NOTE — TELEPHONE ENCOUNTER
Called patient. Discussed test results in detail. ESR has come down significantly. Patient states she is only taking 2 tablets of the methylprednisolone despite my prescription being sent. States that the bottle says only to take 2 tablets daily.   I am

## 2020-07-13 NOTE — TELEPHONE ENCOUNTER
Phoned pt, Dr. Lainey Dennison has instructed pt to take 3 tabs daily for the next two weeks and then repeat labs. This RN phoned pt, left voice message. Will call pt again in am to review instructions.

## 2020-07-14 ENCOUNTER — TELEPHONE (OUTPATIENT)
Dept: HEMATOLOGY/ONCOLOGY | Facility: HOSPITAL | Age: 80
End: 2020-07-14

## 2020-07-14 NOTE — TELEPHONE ENCOUNTER
Daughter Dilia Gastelum calling asking for lab results.  Thank you MD Georgina Morgan, RN   Caller: Unspecified (Today,  1:51 PM)             Labs look good.  The hgb is increasing since starting the steroids.  Let's repeat in a

## 2020-07-16 ENCOUNTER — TELEPHONE (OUTPATIENT)
Dept: RHEUMATOLOGY | Facility: CLINIC | Age: 80
End: 2020-07-16

## 2020-07-16 NOTE — TELEPHONE ENCOUNTER
Patient phoned office, states that we increased her methyprednisolone to 3tabs, and she is unable to take it. She feels her heart 'is racing'. States she did fine while she was at 2tabs. Ok to stay at 2tabs and then repeat labs?

## 2020-07-28 ENCOUNTER — LAB ENCOUNTER (OUTPATIENT)
Dept: LAB | Facility: HOSPITAL | Age: 80
End: 2020-07-28
Attending: INTERNAL MEDICINE
Payer: MEDICARE

## 2020-07-28 ENCOUNTER — TELEPHONE (OUTPATIENT)
Dept: RHEUMATOLOGY | Facility: CLINIC | Age: 80
End: 2020-07-28

## 2020-07-28 DIAGNOSIS — M35.3 PMR (POLYMYALGIA RHEUMATICA) (HCC): Primary | ICD-10-CM

## 2020-07-28 DIAGNOSIS — R70.0 ELEVATED SED RATE: ICD-10-CM

## 2020-07-28 DIAGNOSIS — M35.3 PMR (POLYMYALGIA RHEUMATICA) (HCC): ICD-10-CM

## 2020-07-28 DIAGNOSIS — D63.8 ANEMIA, CHRONIC DISEASE: ICD-10-CM

## 2020-07-28 LAB
ALBUMIN SERPL-MCNC: 3.3 G/DL (ref 3.4–5)
ALBUMIN/GLOB SERPL: 1 {RATIO} (ref 1–2)
ALP LIVER SERPL-CCNC: 42 U/L (ref 55–142)
ALT SERPL-CCNC: 38 U/L (ref 13–56)
ANION GAP SERPL CALC-SCNC: 1 MMOL/L (ref 0–18)
AST SERPL-CCNC: 15 U/L (ref 15–37)
BASOPHILS # BLD AUTO: 0.07 X10(3) UL (ref 0–0.2)
BASOPHILS NFR BLD AUTO: 0.5 %
BILIRUB SERPL-MCNC: 1.1 MG/DL (ref 0.1–2)
BUN BLD-MCNC: 20 MG/DL (ref 7–18)
BUN/CREAT SERPL: 26.7 (ref 10–20)
CALCIUM BLD-MCNC: 9.1 MG/DL (ref 8.5–10.1)
CHLORIDE SERPL-SCNC: 102 MMOL/L (ref 98–112)
CO2 SERPL-SCNC: 32 MMOL/L (ref 21–32)
CREAT BLD-MCNC: 0.75 MG/DL (ref 0.55–1.02)
CRP SERPL-MCNC: <0.29 MG/DL (ref ?–0.3)
DEPRECATED HBV CORE AB SER IA-ACNC: 675.5 NG/ML (ref 18–340)
DEPRECATED RDW RBC AUTO: 87.8 FL (ref 35.1–46.3)
EOSINOPHIL # BLD AUTO: 0.28 X10(3) UL (ref 0–0.7)
EOSINOPHIL NFR BLD AUTO: 2.2 %
ERYTHROCYTE [DISTWIDTH] IN BLOOD BY AUTOMATED COUNT: 23.5 % (ref 11–15)
GLOBULIN PLAS-MCNC: 3.3 G/DL (ref 2.8–4.4)
GLUCOSE BLD-MCNC: 86 MG/DL (ref 70–99)
HCT VFR BLD AUTO: 31.4 % (ref 35–48)
HGB BLD-MCNC: 10 G/DL (ref 12–16)
IMM GRANULOCYTES # BLD AUTO: 0.13 X10(3) UL (ref 0–1)
IMM GRANULOCYTES NFR BLD: 1 %
IRON SATURATION: 47 % (ref 15–50)
IRON SERPL-MCNC: 173 UG/DL (ref 50–170)
LYMPHOCYTES # BLD AUTO: 5.38 X10(3) UL (ref 1–4)
LYMPHOCYTES NFR BLD AUTO: 41.9 %
M PROTEIN MFR SERPL ELPH: 6.6 G/DL (ref 6.4–8.2)
MCH RBC QN AUTO: 32.2 PG (ref 26–34)
MCHC RBC AUTO-ENTMCNC: 31.8 G/DL (ref 31–37)
MCV RBC AUTO: 101 FL (ref 80–100)
MONOCYTES # BLD AUTO: 1.12 X10(3) UL (ref 0.1–1)
MONOCYTES NFR BLD AUTO: 8.7 %
NEUTROPHILS # BLD AUTO: 5.87 X10 (3) UL (ref 1.5–7.7)
NEUTROPHILS # BLD AUTO: 5.87 X10(3) UL (ref 1.5–7.7)
NEUTROPHILS NFR BLD AUTO: 45.7 %
OSMOLALITY SERPL CALC.SUM OF ELEC: 282 MOSM/KG (ref 275–295)
PATIENT FASTING Y/N/NP: YES
PLATELET # BLD AUTO: 317 10(3)UL (ref 150–450)
PLATELET MORPHOLOGY: NORMAL
POTASSIUM SERPL-SCNC: 4.2 MMOL/L (ref 3.5–5.1)
RBC # BLD AUTO: 3.11 X10(6)UL (ref 3.8–5.3)
SED RATE-ML: 48 MM/HR (ref 0–25)
SODIUM SERPL-SCNC: 135 MMOL/L (ref 136–145)
TOTAL IRON BINDING CAPACITY: 371 UG/DL (ref 240–450)
TRANSFERRIN SERPL-MCNC: 249 MG/DL (ref 200–360)
WBC # BLD AUTO: 12.9 X10(3) UL (ref 4–11)

## 2020-07-28 PROCEDURE — 36415 COLL VENOUS BLD VENIPUNCTURE: CPT

## 2020-07-28 PROCEDURE — 82728 ASSAY OF FERRITIN: CPT

## 2020-07-28 PROCEDURE — 85025 COMPLETE CBC W/AUTO DIFF WBC: CPT

## 2020-07-28 PROCEDURE — 86140 C-REACTIVE PROTEIN: CPT

## 2020-07-28 PROCEDURE — 83550 IRON BINDING TEST: CPT

## 2020-07-28 PROCEDURE — 85652 RBC SED RATE AUTOMATED: CPT

## 2020-07-28 PROCEDURE — 80053 COMPREHEN METABOLIC PANEL: CPT

## 2020-07-28 PROCEDURE — 83540 ASSAY OF IRON: CPT

## 2020-07-28 RX ORDER — METHYLPREDNISOLONE 8 MG/1
8 TABLET ORAL DAILY
Qty: 90 TABLET | Refills: 0 | Status: SHIPPED | OUTPATIENT
Start: 2020-07-28 | End: 2020-09-16

## 2020-07-28 NOTE — TELEPHONE ENCOUNTER
Returned patient's call. Discussed that ESR is stable compared to 2 weeks ago, CRP still normal.  Patient reports feeling well overall is interested in decreasing the steroids that she feels like it is contributing to her palpitations.   Is currently [place]

## 2020-07-28 NOTE — TELEPHONE ENCOUNTER
Pt phoned office, would like to notify Dr. Whatley Ferrelview she has had her labs drawn today. How should she take her prednisone?

## 2020-07-31 ENCOUNTER — OFFICE VISIT (OUTPATIENT)
Dept: INTERNAL MEDICINE CLINIC | Facility: CLINIC | Age: 80
End: 2020-07-31
Payer: MEDICARE

## 2020-07-31 VITALS
SYSTOLIC BLOOD PRESSURE: 112 MMHG | TEMPERATURE: 98 F | WEIGHT: 139 LBS | RESPIRATION RATE: 16 BRPM | BODY MASS INDEX: 24.63 KG/M2 | HEIGHT: 63 IN | DIASTOLIC BLOOD PRESSURE: 54 MMHG | HEART RATE: 69 BPM

## 2020-07-31 DIAGNOSIS — R63.4 WEIGHT LOSS: Primary | ICD-10-CM

## 2020-07-31 PROCEDURE — 3074F SYST BP LT 130 MM HG: CPT | Performed by: INTERNAL MEDICINE

## 2020-07-31 PROCEDURE — 3008F BODY MASS INDEX DOCD: CPT | Performed by: INTERNAL MEDICINE

## 2020-07-31 PROCEDURE — 3078F DIAST BP <80 MM HG: CPT | Performed by: INTERNAL MEDICINE

## 2020-07-31 PROCEDURE — 99214 OFFICE O/P EST MOD 30 MIN: CPT | Performed by: INTERNAL MEDICINE

## 2020-07-31 RX ORDER — OYSTER SHELL CALCIUM WITH VITAMIN D 500; 200 MG/1; [IU]/1
1 TABLET, FILM COATED ORAL DAILY
COMMUNITY

## 2020-07-31 RX ORDER — MULTIVITAMIN WITH IRON
50 TABLET ORAL DAILY
COMMUNITY

## 2020-07-31 RX ORDER — ESCITALOPRAM OXALATE 5 MG/1
5 TABLET ORAL DAILY
Qty: 30 TABLET | Refills: 2 | Status: SHIPPED | OUTPATIENT
Start: 2020-07-31 | End: 2020-09-18

## 2020-08-03 ENCOUNTER — APPOINTMENT (OUTPATIENT)
Dept: HEMATOLOGY/ONCOLOGY | Facility: HOSPITAL | Age: 80
End: 2020-08-03
Attending: INTERNAL MEDICINE
Payer: MEDICARE

## 2020-08-04 ENCOUNTER — TELEPHONE (OUTPATIENT)
Dept: RHEUMATOLOGY | Facility: CLINIC | Age: 80
End: 2020-08-04

## 2020-08-04 ENCOUNTER — OFFICE VISIT (OUTPATIENT)
Dept: HEMATOLOGY/ONCOLOGY | Facility: HOSPITAL | Age: 80
End: 2020-08-04
Attending: INTERNAL MEDICINE
Payer: MEDICARE

## 2020-08-04 VITALS
RESPIRATION RATE: 16 BRPM | HEART RATE: 83 BPM | OXYGEN SATURATION: 94 % | DIASTOLIC BLOOD PRESSURE: 73 MMHG | WEIGHT: 143 LBS | TEMPERATURE: 97 F | SYSTOLIC BLOOD PRESSURE: 160 MMHG | BODY MASS INDEX: 25.34 KG/M2 | HEIGHT: 63 IN

## 2020-08-04 DIAGNOSIS — D64.9 ANEMIA, UNSPECIFIED TYPE: Primary | ICD-10-CM

## 2020-08-04 LAB
BASOPHILS # BLD AUTO: 0.04 X10(3) UL (ref 0–0.2)
BASOPHILS NFR BLD AUTO: 0.3 %
DEPRECATED RDW RBC AUTO: 88.6 FL (ref 35.1–46.3)
EOSINOPHIL # BLD AUTO: 0.2 X10(3) UL (ref 0–0.7)
EOSINOPHIL NFR BLD AUTO: 1.5 %
ERYTHROCYTE [DISTWIDTH] IN BLOOD BY AUTOMATED COUNT: 22.9 % (ref 11–15)
FOLATE SERPL-MCNC: 53.3 NG/ML (ref 8.7–?)
HCT VFR BLD AUTO: 30.9 % (ref 35–48)
HGB BLD-MCNC: 10 G/DL (ref 12–16)
HGB RETIC QN AUTO: 38.5 PG (ref 28.2–36.6)
IMM GRANULOCYTES # BLD AUTO: 0.12 X10(3) UL (ref 0–1)
IMM GRANULOCYTES NFR BLD: 0.9 %
IMM RETICS NFR: 0.21 RATIO (ref 0.1–0.3)
LYMPHOCYTES # BLD AUTO: 2.33 X10(3) UL (ref 1–4)
LYMPHOCYTES NFR BLD AUTO: 17.3 %
MCH RBC QN AUTO: 33.6 PG (ref 26–34)
MCHC RBC AUTO-ENTMCNC: 32.4 G/DL (ref 31–37)
MCV RBC AUTO: 103.7 FL (ref 80–100)
MONOCYTES # BLD AUTO: 1.25 X10(3) UL (ref 0.1–1)
MONOCYTES NFR BLD AUTO: 9.3 %
NEUTROPHILS # BLD AUTO: 9.56 X10 (3) UL (ref 1.5–7.7)
NEUTROPHILS # BLD AUTO: 9.56 X10(3) UL (ref 1.5–7.7)
NEUTROPHILS NFR BLD AUTO: 70.7 %
PLATELET # BLD AUTO: 304 10(3)UL (ref 150–450)
PLATELET MORPHOLOGY: NORMAL
RBC # BLD AUTO: 2.98 X10(6)UL (ref 3.8–5.3)
RETICS # AUTO: 56.2 X10(3) UL (ref 22.5–147.5)
RETICS/RBC NFR AUTO: 1.9 % (ref 0.5–2.5)
WBC # BLD AUTO: 13.5 X10(3) UL (ref 4–11)

## 2020-08-04 PROCEDURE — 99213 OFFICE O/P EST LOW 20 MIN: CPT | Performed by: INTERNAL MEDICINE

## 2020-08-04 NOTE — TELEPHONE ENCOUNTER
Pt taking methylprednisolone 8 mg and pt states that her mind went crazy and isnt sure what to do. Should she stop taking medication?

## 2020-08-04 NOTE — TELEPHONE ENCOUNTER
Phoned pt, notified she can decrease her steroid dose to 4mg daily, and continue for one week. Pt verbalizes understanding.

## 2020-08-04 NOTE — PROGRESS NOTES
Cancer Center Progress Note  Patient Name: Jackie Power   YOB: 1940   Medical Record Number: [id number]   CSN: [id number]   Attending Physician: Rylie Corrigan M.D.        Date of Visit: 8/4/2020     Chief Complaint:  Patient anemia. History of Present Illness:  Pt is here for follow up. No F/C/NS. She complains of agitation and palpitations since starting the steroids. No bleeding.      Performance Status:  ECOG 0    Past Medical History:  Past Medical History:   Diagnosis on phone: Not on file        Gets together: Not on file        Attends Sikh service: Not on file        Active member of club or organization: Not on file        Attends meetings of clubs or organizations: Not on file        Relationship status: Not o mg by mouth every 6 (six) hours as needed for Pain., Disp: , Rfl:   •  Naproxen Sodium (ALEVE OR), Take by mouth., Disp: , Rfl:   •  RESTASIS 0.05 % Ophthalmic Emulsion, Place 1 drop into both eyes 2 (two) times daily.   , Disp: , Rfl:   •  Cholecalciferol nodes in the cervical, supraclavicular, axillary or inguinal area. Respiratory Normal - Lungs CTA, no rhonchi or wheezing. Cardiovascular Normal - RRR, no murmurs, gallops or rubs.    Abdomen Normal - Non-tender, non-distended, no masses, ascites or hep by:    Leigh Galindo M.D.   [facility]

## 2020-08-05 NOTE — PROGRESS NOTES
HPI:    Patient ID: Bartolo Mancia is a 78year old female.     HPI  Here with  to discuss wt loss and specialist care, pt had resisted addressing several issues previously but now willing, seeing heme, and rheum, signif anxiety, often does Exam   Constitutional: She is oriented to person, place, and time. She appears well-developed and well-nourished. HENT:   Head: Normocephalic and atraumatic. Cardiovascular: Normal rate, regular rhythm and normal heart sounds. No murmur heard.   Pulmo

## 2020-08-06 ENCOUNTER — TELEPHONE (OUTPATIENT)
Dept: RHEUMATOLOGY | Facility: CLINIC | Age: 80
End: 2020-08-06

## 2020-08-06 NOTE — TELEPHONE ENCOUNTER
Patient called to report that she is having increased palpitations and dry eyes. She is currently on 2 mg prednisone.

## 2020-08-06 NOTE — TELEPHONE ENCOUNTER
LVM for patient to call the office for Dr. Ruma Peres recommendations. My chart message sent with Dr. Ruma Peres recommendation to take prednisone 2 mg qod x 4 days and to f/u with PCP, urgent care or cardiology re: palpitations.

## 2020-08-10 PROBLEM — R00.2 PALPITATIONS: Status: ACTIVE | Noted: 2020-08-10

## 2020-08-11 ENCOUNTER — NURSE ONLY (OUTPATIENT)
Dept: HEMATOLOGY/ONCOLOGY | Facility: HOSPITAL | Age: 80
End: 2020-08-11
Attending: INTERNAL MEDICINE
Payer: MEDICARE

## 2020-08-11 DIAGNOSIS — D64.9 ANEMIA, UNSPECIFIED TYPE: ICD-10-CM

## 2020-08-11 LAB
BASOPHILS # BLD AUTO: 0.06 X10(3) UL (ref 0–0.2)
BASOPHILS NFR BLD AUTO: 0.6 %
DEPRECATED RDW RBC AUTO: 85.9 FL (ref 35.1–46.3)
EOSINOPHIL # BLD AUTO: 0.29 X10(3) UL (ref 0–0.7)
EOSINOPHIL NFR BLD AUTO: 2.9 %
ERYTHROCYTE [DISTWIDTH] IN BLOOD BY AUTOMATED COUNT: 22.6 % (ref 11–15)
HCT VFR BLD AUTO: 31.5 % (ref 35–48)
HGB BLD-MCNC: 10.2 G/DL (ref 12–16)
IMM GRANULOCYTES # BLD AUTO: 0.1 X10(3) UL (ref 0–1)
IMM GRANULOCYTES NFR BLD: 1 %
LYMPHOCYTES # BLD AUTO: 2.54 X10(3) UL (ref 1–4)
LYMPHOCYTES NFR BLD AUTO: 25.8 %
MCH RBC QN AUTO: 33.1 PG (ref 26–34)
MCHC RBC AUTO-ENTMCNC: 32.4 G/DL (ref 31–37)
MCV RBC AUTO: 102.3 FL (ref 80–100)
MONOCYTES # BLD AUTO: 1.1 X10(3) UL (ref 0.1–1)
MONOCYTES NFR BLD AUTO: 11.2 %
NEUTROPHILS # BLD AUTO: 5.77 X10 (3) UL (ref 1.5–7.7)
NEUTROPHILS # BLD AUTO: 5.77 X10(3) UL (ref 1.5–7.7)
NEUTROPHILS NFR BLD AUTO: 58.5 %
PLATELET # BLD AUTO: 344 10(3)UL (ref 150–450)
PLATELET MORPHOLOGY: NORMAL
RBC # BLD AUTO: 3.08 X10(6)UL (ref 3.8–5.3)
WBC # BLD AUTO: 9.9 X10(3) UL (ref 4–11)

## 2020-08-11 PROCEDURE — 36415 COLL VENOUS BLD VENIPUNCTURE: CPT

## 2020-08-11 PROCEDURE — 85025 COMPLETE CBC W/AUTO DIFF WBC: CPT

## 2020-08-12 LAB — Lab: NEGATIVE

## 2020-08-18 ENCOUNTER — NURSE ONLY (OUTPATIENT)
Dept: HEMATOLOGY/ONCOLOGY | Facility: HOSPITAL | Age: 80
End: 2020-08-18
Attending: INTERNAL MEDICINE
Payer: MEDICARE

## 2020-08-18 DIAGNOSIS — D64.9 ANEMIA, UNSPECIFIED TYPE: ICD-10-CM

## 2020-08-18 DIAGNOSIS — E78.2 MIXED HYPERLIPIDEMIA: ICD-10-CM

## 2020-08-18 LAB
BASOPHILS # BLD AUTO: 0.04 X10(3) UL (ref 0–0.2)
BASOPHILS NFR BLD AUTO: 0.5 %
CHOLEST SMN-MCNC: 205 MG/DL (ref ?–200)
DEPRECATED RDW RBC AUTO: 86.9 FL (ref 35.1–46.3)
EOSINOPHIL # BLD AUTO: 0.27 X10(3) UL (ref 0–0.7)
EOSINOPHIL NFR BLD AUTO: 3.1 %
ERYTHROCYTE [DISTWIDTH] IN BLOOD BY AUTOMATED COUNT: 22 % (ref 11–15)
HCT VFR BLD AUTO: 30.8 % (ref 35–48)
HDLC SERPL-MCNC: 70 MG/DL (ref 40–59)
HGB BLD-MCNC: 9.5 G/DL (ref 12–16)
IMM GRANULOCYTES # BLD AUTO: 0.09 X10(3) UL (ref 0–1)
IMM GRANULOCYTES NFR BLD: 1 %
LDLC SERPL CALC-MCNC: 120 MG/DL (ref ?–100)
LYMPHOCYTES # BLD AUTO: 2.55 X10(3) UL (ref 1–4)
LYMPHOCYTES NFR BLD AUTO: 29.4 %
MCH RBC QN AUTO: 32.5 PG (ref 26–34)
MCHC RBC AUTO-ENTMCNC: 30.8 G/DL (ref 31–37)
MCV RBC AUTO: 105.5 FL (ref 80–100)
MONOCYTES # BLD AUTO: 0.93 X10(3) UL (ref 0.1–1)
MONOCYTES NFR BLD AUTO: 10.7 %
NEUTROPHILS # BLD AUTO: 4.8 X10 (3) UL (ref 1.5–7.7)
NEUTROPHILS # BLD AUTO: 4.8 X10(3) UL (ref 1.5–7.7)
NEUTROPHILS NFR BLD AUTO: 55.3 %
NONHDLC SERPL-MCNC: 135 MG/DL (ref ?–130)
PATIENT FASTING Y/N/NP: YES
PLATELET # BLD AUTO: 350 10(3)UL (ref 150–450)
PLATELET MORPHOLOGY: NORMAL
RBC # BLD AUTO: 2.92 X10(6)UL (ref 3.8–5.3)
TRIGL SERPL-MCNC: 75 MG/DL (ref 30–149)
VLDLC SERPL CALC-MCNC: 15 MG/DL (ref 0–30)
WBC # BLD AUTO: 8.7 X10(3) UL (ref 4–11)

## 2020-08-18 PROCEDURE — 85025 COMPLETE CBC W/AUTO DIFF WBC: CPT

## 2020-08-18 PROCEDURE — 80061 LIPID PANEL: CPT

## 2020-08-18 PROCEDURE — 36415 COLL VENOUS BLD VENIPUNCTURE: CPT

## 2020-08-18 NOTE — PROGRESS NOTES
Karolina,     Your cholesterol levels are higher. I recommend we add statin therapy- rosuvastatin 5mg nightly. I will send a prescription to your pharmacy. Please let us know if you decide not to take.    If you start the statin therapy, we will need to re

## 2020-08-25 ENCOUNTER — NURSE ONLY (OUTPATIENT)
Dept: HEMATOLOGY/ONCOLOGY | Facility: HOSPITAL | Age: 80
End: 2020-08-25
Attending: INTERNAL MEDICINE
Payer: MEDICARE

## 2020-08-25 PROCEDURE — 36415 COLL VENOUS BLD VENIPUNCTURE: CPT

## 2020-09-16 ENCOUNTER — OFFICE VISIT (OUTPATIENT)
Dept: RHEUMATOLOGY | Facility: CLINIC | Age: 80
End: 2020-09-16
Payer: MEDICARE

## 2020-09-16 VITALS
HEART RATE: 60 BPM | SYSTOLIC BLOOD PRESSURE: 138 MMHG | BODY MASS INDEX: 25.66 KG/M2 | RESPIRATION RATE: 16 BRPM | DIASTOLIC BLOOD PRESSURE: 52 MMHG | WEIGHT: 144.81 LBS | HEIGHT: 63 IN | TEMPERATURE: 97 F

## 2020-09-16 DIAGNOSIS — M35.00 SJOGREN'S SYNDROME WITHOUT EXTRAGLANDULAR INVOLVEMENT (HCC): ICD-10-CM

## 2020-09-16 DIAGNOSIS — M15.9 PRIMARY OSTEOARTHRITIS INVOLVING MULTIPLE JOINTS: ICD-10-CM

## 2020-09-16 DIAGNOSIS — Z87.39 HISTORY OF CALCIUM PYROPHOSPHATE DEPOSITION DISEASE (CPPD): ICD-10-CM

## 2020-09-16 DIAGNOSIS — M35.3 PMR (POLYMYALGIA RHEUMATICA) (HCC): Primary | ICD-10-CM

## 2020-09-16 DIAGNOSIS — R76.8 POSITIVE ANA (ANTINUCLEAR ANTIBODY): ICD-10-CM

## 2020-09-16 DIAGNOSIS — R68.2 DRY MOUTH: ICD-10-CM

## 2020-09-16 PROCEDURE — 99214 OFFICE O/P EST MOD 30 MIN: CPT | Performed by: INTERNAL MEDICINE

## 2020-09-16 PROCEDURE — 3075F SYST BP GE 130 - 139MM HG: CPT | Performed by: INTERNAL MEDICINE

## 2020-09-16 PROCEDURE — 3078F DIAST BP <80 MM HG: CPT | Performed by: INTERNAL MEDICINE

## 2020-09-16 PROCEDURE — 3008F BODY MASS INDEX DOCD: CPT | Performed by: INTERNAL MEDICINE

## 2020-09-16 RX ORDER — CEVIMELINE HYDROCHLORIDE 30 MG/1
30 CAPSULE ORAL DAILY
Qty: 7 CAPSULE | Refills: 0 | Status: SHIPPED | OUTPATIENT
Start: 2020-09-16 | End: 2020-09-23

## 2020-09-16 NOTE — PATIENT INSTRUCTIONS
-- you have both Sjogren's disease and PMR (polymyalgia rheumatica)  -- for the dry eyes, continue Restasis, can also add over the counter eye drops- Systane, Refresh or Blink  -- for the dry mouth, continue Biotene products, okay to start cevimeline once

## 2020-09-16 NOTE — PROGRESS NOTES
RHEUMATOLOGY FOLLOW UP   Date of visit: 9/16/2020  ? Patient presents with:  Sjogren's Syndrome: 3 month f/u. Feeling so-so. Still have palpitations but coming less. Muscle aches and back pain. Dry eyes and dry mouth.  A little bit of joint pain, but sti updated inflammatory markers (blood tests) next time you go to lab to evaluate status of PMR  -- if you feel worse, we may have to restart low dose of steroids and methotrexate, but will wait to do so  -- another option for the Sjogren's would be plaquenil generalized weakness. + dry eyes, using Restasis which she does not think is help. Is considering plug placement.   + dry mouth, trying biotene mouthwash and well as spray and occasionally lozenges  Denies joint swelling.   Can get pain diffusely- particu pain in her joints and muscles. States on a daily basis. + per daughter, gait altered where she seems to be attributed to possible arthritis but pt declines back pain.  Hx of herniated discs in the past.   + can get swelling around the ankles   + morning Mother         osteoporosis   • Other (Other) Sister         osteoporosis   • Heart Disorder Maternal Grandmother         MI     Social History:  Social History    Tobacco Use      Smoking status: Never Smoker      Smokeless tobacco: Never Used    Alcohol Negative for blurred vision, double vision and photophobia. Respiratory: Positive for shortness of breath. Negative for cough and wheezing. Cardiovascular: Positive for palpitations. Negative for chest pain and leg swelling.    Gastrointestinal: Lisa Mor crepitus. SI joints non-tender. No spinous process tenderness. Bilateral knees without medial joint line tenderness, no crepitus, no effusion. Lymphadenopathy:     She has no cervical adenopathy.    Neurological: She is alert and oriented to person, p 08/25/2020    LYMABS 2.27 08/25/2020    MOABSO 0.81 08/25/2020    EOABSO 0.22 08/25/2020    BAABSO 0.05 08/25/2020     Lab Results   Component Value Date    GLU 86 07/28/2020    BUN 20 (H) 07/28/2020    BUNCREA 26.7 (H) 07/28/2020    CREATSERUM 0.75 07/28/

## 2020-09-18 ENCOUNTER — OFFICE VISIT (OUTPATIENT)
Dept: INTERNAL MEDICINE CLINIC | Facility: CLINIC | Age: 80
End: 2020-09-18
Payer: MEDICARE

## 2020-09-18 VITALS
DIASTOLIC BLOOD PRESSURE: 80 MMHG | TEMPERATURE: 98 F | SYSTOLIC BLOOD PRESSURE: 132 MMHG | HEIGHT: 63 IN | OXYGEN SATURATION: 99 % | BODY MASS INDEX: 25.87 KG/M2 | WEIGHT: 146 LBS | HEART RATE: 66 BPM | RESPIRATION RATE: 16 BRPM

## 2020-09-18 DIAGNOSIS — Z78.0 POST-MENOPAUSAL: Primary | ICD-10-CM

## 2020-09-18 DIAGNOSIS — E55.9 VITAMIN D DEFICIENCY: ICD-10-CM

## 2020-09-18 DIAGNOSIS — D18.03 HEPATIC HEMANGIOMA: ICD-10-CM

## 2020-09-18 DIAGNOSIS — R53.83 FATIGUE, UNSPECIFIED TYPE: ICD-10-CM

## 2020-09-18 DIAGNOSIS — D64.9 ANEMIA, UNSPECIFIED TYPE: ICD-10-CM

## 2020-09-18 DIAGNOSIS — M85.80 OSTEOPENIA, UNSPECIFIED LOCATION: ICD-10-CM

## 2020-09-18 DIAGNOSIS — R91.1 LUNG NODULE: ICD-10-CM

## 2020-09-18 DIAGNOSIS — R00.2 PALPITATIONS: ICD-10-CM

## 2020-09-18 DIAGNOSIS — Z00.00 ROUTINE GENERAL MEDICAL EXAMINATION AT A HEALTH CARE FACILITY: ICD-10-CM

## 2020-09-18 DIAGNOSIS — M35.00 SJOGREN'S SYNDROME WITHOUT EXTRAGLANDULAR INVOLVEMENT (HCC): ICD-10-CM

## 2020-09-18 DIAGNOSIS — E78.2 MIXED HYPERLIPIDEMIA: ICD-10-CM

## 2020-09-18 DIAGNOSIS — M35.00 SICCA SYNDROME (HCC): ICD-10-CM

## 2020-09-18 DIAGNOSIS — I10 ESSENTIAL HYPERTENSION: ICD-10-CM

## 2020-09-18 PROBLEM — L98.9 SKIN LESION: Status: RESOLVED | Noted: 2019-06-19 | Resolved: 2020-09-18

## 2020-09-18 PROCEDURE — 90662 IIV NO PRSV INCREASED AG IM: CPT | Performed by: INTERNAL MEDICINE

## 2020-09-18 PROCEDURE — 3079F DIAST BP 80-89 MM HG: CPT | Performed by: INTERNAL MEDICINE

## 2020-09-18 PROCEDURE — 99397 PER PM REEVAL EST PAT 65+ YR: CPT | Performed by: INTERNAL MEDICINE

## 2020-09-18 PROCEDURE — 3008F BODY MASS INDEX DOCD: CPT | Performed by: INTERNAL MEDICINE

## 2020-09-18 PROCEDURE — G0008 ADMIN INFLUENZA VIRUS VAC: HCPCS | Performed by: INTERNAL MEDICINE

## 2020-09-18 PROCEDURE — 96160 PT-FOCUSED HLTH RISK ASSMT: CPT | Performed by: INTERNAL MEDICINE

## 2020-09-18 PROCEDURE — 3075F SYST BP GE 130 - 139MM HG: CPT | Performed by: INTERNAL MEDICINE

## 2020-09-18 PROCEDURE — G0439 PPPS, SUBSEQ VISIT: HCPCS | Performed by: INTERNAL MEDICINE

## 2020-09-18 RX ORDER — ESCITALOPRAM OXALATE 10 MG/1
10 TABLET ORAL DAILY
Qty: 90 TABLET | Refills: 3 | Status: SHIPPED | OUTPATIENT
Start: 2020-09-18 | End: 2021-07-12

## 2020-09-22 NOTE — PROGRESS NOTES
HPI:    Patient ID: Yumi Kennedy is a [de-identified]year old female.     HPI  Here for AHA, feels doing much better, anxiety is still present but much improved, here with spouse who agrees, hyperchol, htn, sjogrens syndrome, follows with rheum  /80 by mouth. Patient's daughter reports patient is taking Vitamin D 2000 units every other day. • CENTRUM SILVER OR TABS every other day     • Cevimeline HCl 30 MG Oral Cap Take 1 capsule (30 mg total) by mouth daily for 7 days.  (Patient not taking: Murphy CBC W Differential W Platelet [E]      Fluzone High Dose 65 yr and older PFS [98474]      Meds This Visit:  Requested Prescriptions     Signed Prescriptions Disp Refills   • escitalopram 10 MG Oral Tab 90 tablet 3     Sig: Take 1 tablet (10 mg total) by

## 2020-09-29 ENCOUNTER — OFFICE VISIT (OUTPATIENT)
Dept: HEMATOLOGY/ONCOLOGY | Facility: HOSPITAL | Age: 80
End: 2020-09-29
Attending: INTERNAL MEDICINE
Payer: MEDICARE

## 2020-09-29 VITALS
WEIGHT: 148.38 LBS | HEART RATE: 72 BPM | HEIGHT: 62.99 IN | DIASTOLIC BLOOD PRESSURE: 60 MMHG | BODY MASS INDEX: 26.29 KG/M2 | RESPIRATION RATE: 16 BRPM | OXYGEN SATURATION: 96 % | TEMPERATURE: 99 F | SYSTOLIC BLOOD PRESSURE: 150 MMHG

## 2020-09-29 DIAGNOSIS — D63.8 ANEMIA DUE TO CHRONIC ILLNESS: Primary | ICD-10-CM

## 2020-09-29 DIAGNOSIS — R70.0 ELEVATED SED RATE: ICD-10-CM

## 2020-09-29 DIAGNOSIS — M35.3 PMR (POLYMYALGIA RHEUMATICA) (HCC): ICD-10-CM

## 2020-09-29 PROCEDURE — 99213 OFFICE O/P EST LOW 20 MIN: CPT | Performed by: INTERNAL MEDICINE

## 2020-10-01 DIAGNOSIS — M35.3 PMR (POLYMYALGIA RHEUMATICA) (HCC): Primary | ICD-10-CM

## 2020-10-01 DIAGNOSIS — R70.0 ELEVATED SED RATE: ICD-10-CM

## 2020-10-08 PROBLEM — L82.1 OTHER SEBORRHEIC KERATOSIS: Status: ACTIVE | Noted: 2020-10-08

## 2020-10-08 NOTE — PROGRESS NOTES
Cancer Center Progress Note  Patient Name: Bang Patient   YOB: 1940   Medical Record Number: [id number]   CSN: [id number]   Attending Physician: Franck Chapman M.D.        Date of Visit: 9/29/20     Chief Complaint:  Patient anemia. History of Present Illness:  Pt is here for follow up. Overall, she is feeling much better. Her energy level has improved. Appetite is better. Still mild fatigue. No F/C/NS. No wt loss.      Performance Status:  ECOG 0    Past Medical History: Minutes per session: Not on file      Stress: Not on file    Relationships      Social connections        Talks on phone: Not on file        Gets together: Not on file        Attends Anabaptist service: Not on file        Active member of club or [place] Take 2 tablets by mouth daily. , Disp: , Rfl:   •  acetaminophen 325 MG Oral Tab, Take 325 mg by mouth every 6 (six) hours as needed for Pain., Disp: , Rfl:   •  Naproxen Sodium (ALEVE OR), Take by mouth., Disp: , Rfl:   •  RESTASIS 0.05 % Ophthalmic Emulsi Normal - Conjunctivae and sclerae are clear and without icterus. Pupils are reactive and equal.   Hematologic/Lymphatic Normal - No petechiae or purpura. No tender or palpable lymph nodes in the cervical, supraclavicular, axillary or inguinal area.    Resp 0.00 - 1.00 x10(3) uL 0.05   Neutrophils % Latest Units: % 51.2   Lymphocytes % Latest Units: % 35.0   Monocytes % Latest Units: % 9.4   Eosinophils % Latest Units: % 3.4   Basophils % Latest Units: % 0.5   Immature Granulocyte % Latest Units: % 0.5   RBC

## 2020-10-10 ENCOUNTER — APPOINTMENT (OUTPATIENT)
Dept: LAB | Facility: HOSPITAL | Age: 80
End: 2020-10-10
Attending: STUDENT IN AN ORGANIZED HEALTH CARE EDUCATION/TRAINING PROGRAM
Payer: MEDICARE

## 2020-10-10 DIAGNOSIS — Z01.818 PRE-OP TESTING: ICD-10-CM

## 2020-10-13 PROBLEM — K64.1 SECOND DEGREE HEMORRHOIDS: Status: ACTIVE | Noted: 2020-10-13

## 2020-10-13 PROBLEM — R63.0 ANOREXIA: Status: ACTIVE | Noted: 2020-10-13

## 2020-10-13 PROBLEM — D64.9 ANEMIA, UNSPECIFIED: Status: ACTIVE | Noted: 2020-10-13

## 2020-10-13 PROBLEM — R63.4 WEIGHT LOSS: Status: ACTIVE | Noted: 2020-10-13

## 2020-10-13 PROBLEM — K57.30 COLON, DIVERTICULOSIS: Status: ACTIVE | Noted: 2020-10-13

## 2020-10-15 ENCOUNTER — HOSPITAL ENCOUNTER (OUTPATIENT)
Dept: BONE DENSITY | Age: 80
Discharge: HOME | End: 2020-10-15
Attending: INTERNAL MEDICINE
Payer: MEDICARE

## 2020-10-15 DIAGNOSIS — Z78.0 POST-MENOPAUSAL: ICD-10-CM

## 2020-10-15 PROCEDURE — 77080 DXA BONE DENSITY AXIAL: CPT | Performed by: INTERNAL MEDICINE

## 2020-10-19 RX ORDER — ESCITALOPRAM OXALATE 5 MG/1
5 TABLET ORAL DAILY
Qty: 30 TABLET | Refills: 0 | OUTPATIENT
Start: 2020-10-19

## 2020-10-19 NOTE — TELEPHONE ENCOUNTER
Last VISIT 9/18/20 JL CPE    Last REFILL 9/18/20 Escitalopram 90T 3R (Medication discontinued)    Last LABS 10/7/20 Sed Rate, CRP    Future Appointments   Date Time Provider Johanne Mckeon   12/28/2020 10:30 AM Mik Wilcox MD [facility] HEM ONC [facility]

## 2020-11-19 ENCOUNTER — LAB ENCOUNTER (OUTPATIENT)
Dept: LAB | Age: 80
End: 2020-11-19
Attending: INTERNAL MEDICINE
Payer: MEDICARE

## 2020-11-19 DIAGNOSIS — D64.9 ANEMIA, UNSPECIFIED TYPE: ICD-10-CM

## 2020-11-19 DIAGNOSIS — E78.2 MIXED HYPERLIPIDEMIA: ICD-10-CM

## 2020-11-19 PROCEDURE — 82248 BILIRUBIN DIRECT: CPT

## 2020-11-19 PROCEDURE — 80053 COMPREHEN METABOLIC PANEL: CPT

## 2020-11-19 PROCEDURE — 80061 LIPID PANEL: CPT

## 2020-11-19 PROCEDURE — 36415 COLL VENOUS BLD VENIPUNCTURE: CPT

## 2020-11-19 PROCEDURE — 85025 COMPLETE CBC W/AUTO DIFF WBC: CPT

## 2020-11-20 NOTE — PROGRESS NOTES
Karolina,     Your cholesterol levels are elevated from your last check. Your kidney and liver function tests are stable. You higher cholesterol levels place you at an increased risk for coronary artery disease.  I would schedule an office visit to eleonora

## 2020-12-28 ENCOUNTER — OFFICE VISIT (OUTPATIENT)
Dept: HEMATOLOGY/ONCOLOGY | Facility: HOSPITAL | Age: 80
End: 2020-12-28
Attending: INTERNAL MEDICINE
Payer: MEDICARE

## 2020-12-28 VITALS
RESPIRATION RATE: 16 BRPM | WEIGHT: 150 LBS | BODY MASS INDEX: 26.58 KG/M2 | SYSTOLIC BLOOD PRESSURE: 176 MMHG | HEART RATE: 63 BPM | HEIGHT: 62.99 IN | DIASTOLIC BLOOD PRESSURE: 83 MMHG | OXYGEN SATURATION: 96 % | TEMPERATURE: 97 F

## 2020-12-28 DIAGNOSIS — M35.3 PMR (POLYMYALGIA RHEUMATICA) (HCC): ICD-10-CM

## 2020-12-28 DIAGNOSIS — D63.8 ANEMIA DUE TO CHRONIC ILLNESS: ICD-10-CM

## 2020-12-28 PROCEDURE — 99213 OFFICE O/P EST LOW 20 MIN: CPT | Performed by: INTERNAL MEDICINE

## 2020-12-28 NOTE — PROGRESS NOTES
Cancer Center Progress Note  Patient Name: Christine Tidwell   YOB: 1940   Medical Record Number: [id number]   CSN: [id number]   Attending Physician: Nickie Granados M.D.        Date of Visit: 12/28/2020       Chief Complaint:  [place] Rushing anemia. History of Present Illness:  Pt is here for follow up. Her energy level is stable. Joint swelling and pain are under control. No F/C/NS. No bleeding.      Performance Status:  ECOG 0    Past Medical History:  Past Medical History:   Diagnosis Da status: Never Smoker      Smokeless tobacco: Never Used    Substance and Sexual Activity      Alcohol use: No        Alcohol/week: 0.0 standard drinks      Drug use: No      Sexual activity: Not Currently    Lifestyle      Physical activity        Days per Carbonate-Vitamin D (OSCAL 500/200 D-3) 500-200 MG-UNIT Oral Tab, Take 1 tablet by mouth daily. , Disp: , Rfl:   •  Nebivolol HCl 5 MG Oral Tab, Take 1 tablet (5 mg total) by mouth daily. , Disp: 30 tablet, Rfl: 2  •  Irbesartan 300 MG Oral Tab, Take 300 mg Resp 16   Ht 1.6 m (5' 2.99\")   Wt 68 kg (150 lb)   LMP  (LMP Unknown)   SpO2 96%   BMI 26.58 kg/m²     Physical Examination:    Constitutional Normal - Mood and affect appropriate. Appears close to chronological age. Well nourished. Well developed.    E prognosis, and general treatment was explained to the patient and the family. Electronically Signed by: Elton Greer M.D.   [facility]

## 2020-12-29 ENCOUNTER — LAB ENCOUNTER (OUTPATIENT)
Dept: LAB | Facility: HOSPITAL | Age: 80
End: 2020-12-29
Attending: STUDENT IN AN ORGANIZED HEALTH CARE EDUCATION/TRAINING PROGRAM
Payer: MEDICARE

## 2020-12-29 DIAGNOSIS — A04.8 H. PYLORI INFECTION: ICD-10-CM

## 2020-12-29 PROCEDURE — 87338 HPYLORI STOOL AG IA: CPT

## 2020-12-31 LAB — H PYLORI AG STL QL IA: NEGATIVE

## 2021-01-05 NOTE — PROGRESS NOTES
Great news, H pylori stool antigen is negative, meaning you have completely eradicated the bacteria.   Please call with any questions,    Tivis Lanes MD

## 2021-01-13 ENCOUNTER — OFFICE VISIT (OUTPATIENT)
Dept: RHEUMATOLOGY | Facility: CLINIC | Age: 81
End: 2021-01-13
Payer: MEDICARE

## 2021-01-13 VITALS
TEMPERATURE: 97 F | RESPIRATION RATE: 16 BRPM | WEIGHT: 150 LBS | BODY MASS INDEX: 27.6 KG/M2 | HEART RATE: 62 BPM | HEIGHT: 62 IN | DIASTOLIC BLOOD PRESSURE: 54 MMHG | SYSTOLIC BLOOD PRESSURE: 160 MMHG

## 2021-01-13 DIAGNOSIS — M35.00 SJOGREN'S SYNDROME WITHOUT EXTRAGLANDULAR INVOLVEMENT (HCC): ICD-10-CM

## 2021-01-13 DIAGNOSIS — R70.0 ELEVATED SED RATE: ICD-10-CM

## 2021-01-13 DIAGNOSIS — R79.82 ELEVATED C-REACTIVE PROTEIN (CRP): ICD-10-CM

## 2021-01-13 DIAGNOSIS — M35.3 PMR (POLYMYALGIA RHEUMATICA) (HCC): Primary | ICD-10-CM

## 2021-01-13 DIAGNOSIS — Z87.39 HISTORY OF CALCIUM PYROPHOSPHATE DEPOSITION DISEASE (CPPD): ICD-10-CM

## 2021-01-13 DIAGNOSIS — R76.8 POSITIVE ANA (ANTINUCLEAR ANTIBODY): ICD-10-CM

## 2021-01-13 PROCEDURE — 3008F BODY MASS INDEX DOCD: CPT | Performed by: INTERNAL MEDICINE

## 2021-01-13 PROCEDURE — 99214 OFFICE O/P EST MOD 30 MIN: CPT | Performed by: INTERNAL MEDICINE

## 2021-01-13 PROCEDURE — 3078F DIAST BP <80 MM HG: CPT | Performed by: INTERNAL MEDICINE

## 2021-01-13 PROCEDURE — 3077F SYST BP >= 140 MM HG: CPT | Performed by: INTERNAL MEDICINE

## 2021-01-13 RX ORDER — MULTIVIT WITH MINERALS/LUTEIN
1000 TABLET ORAL DAILY
COMMUNITY

## 2021-01-13 NOTE — PROGRESS NOTES
RHEUMATOLOGY FOLLOW UP   Date of visit: 1/13/2021  ? Patient presents with:  PMR: 4 month f/u. Feeling the same. Some joint pain in feet and legs. Forgot to take the trial of the cevimeline. Dry mouth is still bad.  Doing restasis for dry eyes and it hel levels of inflammation  -- read about decadron which is a different type of steroid which we can consider trying if the labs are still abnormal   -- in terms of diet, try to avoid sugar, processed food and gluten and see if these things help  -- follow up about the same. Did  her prescription for cevimeline but did not take medication  Continues to have dry eyes and dry mouth. Continues to have pain all over, particularly in her her legs. Feels the worst in the mornings.  Has difficulty walking at eyes; no hx of plugs but was offered pt declined  + dry mouth which she attributes to medication, feels better when breaths through nose at night instead of mouth. Uses otc biotene spray, toothpaste and mouthwash.  Does feel like this helps.   + intermitten (polymyalgia rheumatica) (HCC)    • Sicca syndrome (HCC)    • Skin lesion 6/19/2019   • Sputum production    • Stress    • Unspecified essential hypertension    • Wears glasses    • Weight loss      Past Surgical History:  Past Surgical History:   Procedur Cap, Take 5,000 Units by mouth.  Patient's daughter reports patient is taking Vitamin D 2000 units every other day. , Disp: , Rfl:     •  CENTRUM SILVER OR TABS, every other day, Disp: , Rfl:       Modified Medications    No medications on file     There ar Normal range of motion. Neck supple. No JVD present. No tracheal deviation present. Cardiovascular: Normal rate, regular rhythm and normal heart sounds. Pulmonary/Chest: Effort normal and breath sounds normal. No respiratory distress.  She has no wheeze wrists. No erosive changes are identified.      Labs:  Lab Results   Component Value Date    WBC 7.9 12/28/2020    RBC 3.14 (L) 12/28/2020    HGB 10.2 (L) 12/28/2020    HCT 30.9 (L) 12/28/2020    .0 12/28/2020    MPV 10.1 (H) 09/11/2012    MCV 98.4

## 2021-01-13 NOTE — PATIENT INSTRUCTIONS
-- try the cevimeline once daily and update me if it helps with the dry mouth or not  -- get updated labs to check levels of inflammation  -- read about decadron which is a different type of steroid which we can consider trying if the labs are still abnorm

## 2021-01-18 ENCOUNTER — OFFICE VISIT (OUTPATIENT)
Dept: INTERNAL MEDICINE CLINIC | Facility: CLINIC | Age: 81
End: 2021-01-18
Payer: MEDICARE

## 2021-01-18 ENCOUNTER — LAB ENCOUNTER (OUTPATIENT)
Dept: LAB | Age: 81
End: 2021-01-18
Attending: INTERNAL MEDICINE
Payer: MEDICARE

## 2021-01-18 VITALS
BODY MASS INDEX: 27.79 KG/M2 | DIASTOLIC BLOOD PRESSURE: 60 MMHG | RESPIRATION RATE: 16 BRPM | WEIGHT: 151 LBS | SYSTOLIC BLOOD PRESSURE: 128 MMHG | TEMPERATURE: 97 F | HEART RATE: 59 BPM | HEIGHT: 62 IN

## 2021-01-18 DIAGNOSIS — L82.1 OTHER SEBORRHEIC KERATOSIS: ICD-10-CM

## 2021-01-18 DIAGNOSIS — I10 ESSENTIAL HYPERTENSION: ICD-10-CM

## 2021-01-18 DIAGNOSIS — D64.9 ANEMIA, UNSPECIFIED TYPE: ICD-10-CM

## 2021-01-18 DIAGNOSIS — M35.00 SJOGREN'S SYNDROME WITHOUT EXTRAGLANDULAR INVOLVEMENT (HCC): ICD-10-CM

## 2021-01-18 DIAGNOSIS — K57.30 COLON, DIVERTICULOSIS: ICD-10-CM

## 2021-01-18 DIAGNOSIS — D89.89 AUTOIMMUNE DISORDER (HCC): ICD-10-CM

## 2021-01-18 DIAGNOSIS — M35.3 PMR (POLYMYALGIA RHEUMATICA) (HCC): ICD-10-CM

## 2021-01-18 DIAGNOSIS — E78.2 MIXED HYPERLIPIDEMIA: Primary | ICD-10-CM

## 2021-01-18 DIAGNOSIS — M85.80 OSTEOPENIA, UNSPECIFIED LOCATION: ICD-10-CM

## 2021-01-18 DIAGNOSIS — D18.03 HEPATIC HEMANGIOMA: ICD-10-CM

## 2021-01-18 DIAGNOSIS — R53.83 FATIGUE, UNSPECIFIED TYPE: ICD-10-CM

## 2021-01-18 DIAGNOSIS — R79.82 ELEVATED C-REACTIVE PROTEIN (CRP): ICD-10-CM

## 2021-01-18 DIAGNOSIS — M35.00 SICCA SYNDROME (HCC): ICD-10-CM

## 2021-01-18 DIAGNOSIS — F41.9 ANXIETY: ICD-10-CM

## 2021-01-18 DIAGNOSIS — R70.0 ELEVATED SED RATE: ICD-10-CM

## 2021-01-18 DIAGNOSIS — Z12.31 ENCOUNTER FOR SCREENING MAMMOGRAM FOR MALIGNANT NEOPLASM OF BREAST: ICD-10-CM

## 2021-01-18 DIAGNOSIS — K64.1 SECOND DEGREE HEMORRHOIDS: ICD-10-CM

## 2021-01-18 DIAGNOSIS — Z00.00 ROUTINE GENERAL MEDICAL EXAMINATION AT A HEALTH CARE FACILITY: ICD-10-CM

## 2021-01-18 PROBLEM — R00.2 PALPITATIONS: Status: RESOLVED | Noted: 2020-08-10 | Resolved: 2021-01-18

## 2021-01-18 PROBLEM — R63.0 ANOREXIA: Status: RESOLVED | Noted: 2020-10-13 | Resolved: 2021-01-18

## 2021-01-18 PROBLEM — R63.4 WEIGHT LOSS: Status: RESOLVED | Noted: 2020-10-13 | Resolved: 2021-01-18

## 2021-01-18 LAB
CRP SERPL-MCNC: 0.38 MG/DL (ref ?–0.3)
SED RATE-ML: 132 MM/HR

## 2021-01-18 PROCEDURE — 96160 PT-FOCUSED HLTH RISK ASSMT: CPT | Performed by: INTERNAL MEDICINE

## 2021-01-18 PROCEDURE — 85652 RBC SED RATE AUTOMATED: CPT

## 2021-01-18 PROCEDURE — 36415 COLL VENOUS BLD VENIPUNCTURE: CPT

## 2021-01-18 PROCEDURE — G0439 PPPS, SUBSEQ VISIT: HCPCS | Performed by: INTERNAL MEDICINE

## 2021-01-18 PROCEDURE — 99397 PER PM REEVAL EST PAT 65+ YR: CPT | Performed by: INTERNAL MEDICINE

## 2021-01-18 PROCEDURE — 3074F SYST BP LT 130 MM HG: CPT | Performed by: INTERNAL MEDICINE

## 2021-01-18 PROCEDURE — 3078F DIAST BP <80 MM HG: CPT | Performed by: INTERNAL MEDICINE

## 2021-01-18 PROCEDURE — 86140 C-REACTIVE PROTEIN: CPT

## 2021-01-18 PROCEDURE — 3008F BODY MASS INDEX DOCD: CPT | Performed by: INTERNAL MEDICINE

## 2021-01-18 RX ORDER — MAGNESIUM 30 MG
30 TABLET ORAL
COMMUNITY

## 2021-01-18 NOTE — PROGRESS NOTES
HPI:    Patient ID: Kingsley Parisi is a [de-identified]year old female.     HPI  Here for map, no acute complaints, not taking her statin given by cardiology, anxiety much improved, followed by heme and reheum, states feels much improved, has worked on [employer], [employer] Cholecalciferol (VITAMIN D) 1000 UNIT Oral Cap Take 5,000 Units by mouth. Patient's daughter reports patient is taking Vitamin D 2000 units every other day.       • CENTRUM SILVER OR TABS every other day     • Na Sulfate-K Sulfate-Mg Sulf (SUPREP BOWEL PREP follows with derm  Sicca syndrome (hcc)-stable, discussed meds  Fatigue, unspecified type-improved, follow for now  Autoimmune disorder (hcc)-stable, fu with rheum  Anxiety-stable, cont meds  Encounter for screening mammogram for malignant neoplasm of rylie

## 2021-01-19 DIAGNOSIS — M35.3 PMR (POLYMYALGIA RHEUMATICA) (HCC): Primary | ICD-10-CM

## 2021-01-19 DIAGNOSIS — R70.0 ELEVATED SED RATE: ICD-10-CM

## 2021-01-19 RX ORDER — DEXAMETHASONE 1.5 MG/1
TABLET ORAL
Qty: 90 TABLET | Refills: 0 | Status: SHIPPED | OUTPATIENT
Start: 2021-01-19 | End: 2021-04-26

## 2021-02-01 RX ORDER — IRBESARTAN 300 MG/1
300 TABLET ORAL NIGHTLY
Qty: 90 TABLET | Refills: 0 | Status: SHIPPED | OUTPATIENT
Start: 2021-02-01 | End: 2021-11-26

## 2021-02-26 ENCOUNTER — LAB ENCOUNTER (OUTPATIENT)
Dept: LAB | Age: 81
End: 2021-02-26
Attending: ORTHOPAEDIC SURGERY
Payer: MEDICARE

## 2021-02-26 ENCOUNTER — TELEPHONE (OUTPATIENT)
Dept: RHEUMATOLOGY | Facility: CLINIC | Age: 81
End: 2021-02-26

## 2021-02-26 DIAGNOSIS — M35.3 PMR (POLYMYALGIA RHEUMATICA) (HCC): ICD-10-CM

## 2021-02-26 DIAGNOSIS — R70.0 ELEVATED SED RATE: ICD-10-CM

## 2021-02-26 DIAGNOSIS — M35.3 PMR (POLYMYALGIA RHEUMATICA) (HCC): Primary | ICD-10-CM

## 2021-02-26 DIAGNOSIS — R79.82 ELEVATED C-REACTIVE PROTEIN (CRP): ICD-10-CM

## 2021-02-26 LAB
CRP SERPL-MCNC: <0.29 MG/DL (ref ?–0.3)
SED RATE-ML: 71 MM/HR

## 2021-02-26 PROCEDURE — 86140 C-REACTIVE PROTEIN: CPT

## 2021-02-26 PROCEDURE — 85652 RBC SED RATE AUTOMATED: CPT

## 2021-02-26 PROCEDURE — 36415 COLL VENOUS BLD VENIPUNCTURE: CPT

## 2021-02-26 NOTE — TELEPHONE ENCOUNTER
Pt phoned our office, has been taking dexamethasone 1.5mg tabs since 1/19/21-- currently taking 1 tab daily. Pt has now developed signs of blurry vision, difficulty reading-- also co of palpitations at night. Pt denies other symptoms.  Will forward to Dr. Leon Stands

## 2021-02-26 NOTE — TELEPHONE ENCOUNTER
Pt said her pain has improved, still has complaints of fatigue. Instructed her to have her labs completed, and hopefully Dr. Ramesh Campovered can wean her medication. Pt voiced understanding.

## 2021-03-01 ENCOUNTER — TELEPHONE (OUTPATIENT)
Dept: RHEUMATOLOGY | Facility: CLINIC | Age: 81
End: 2021-03-01

## 2021-03-01 NOTE — TELEPHONE ENCOUNTER
Called pt back and informed her that Dr. Charmayne Harvey wants her on 1/2 tablet daily. Pt verbalized understanding.

## 2021-03-01 NOTE — TELEPHONE ENCOUNTER
Pt called back and I informed her that her labs have significantly improved. She reports having no symptoms. I instructed her to take a 1/2 tablet of the dexamethasone and see how she feels.  Since she doesn't have any symptoms, should she discontinue medic

## 2021-03-01 NOTE — TELEPHONE ENCOUNTER
LVM for pt to call back to discuss results. Okay to let pt know that labs have significantly improved, but still elevated. If having symptoms, okay to try to decrease to 1/2 tablet and go from there.  Remind to see ophthalmologist given the blurred vision

## 2021-03-09 DIAGNOSIS — Z23 NEED FOR VACCINATION: ICD-10-CM

## 2021-03-12 ENCOUNTER — PATIENT MESSAGE (OUTPATIENT)
Dept: RHEUMATOLOGY | Facility: CLINIC | Age: 81
End: 2021-03-12

## 2021-03-12 DIAGNOSIS — H53.8 BLURRED VISION: Primary | ICD-10-CM

## 2021-03-12 NOTE — TELEPHONE ENCOUNTER
Alicia Garcia,  3/12/2021 11:58 AM CST    ----- Message from Swapnil Porter DO sent at 3/12/2021 11:58 AM CST -----  Please call pt and clarify.  Does she need a referral? If so, please place referral.     Swapnil Porter DO  [facility]  3/12/2021

## 2021-03-12 NOTE — TELEPHONE ENCOUNTER
Phoned pt to clarify her request. Pt would like a referral sent to Dr. Sergio Moran. Has appt on Monday. Referral faxed to their office.

## 2021-04-12 ENCOUNTER — LAB ENCOUNTER (OUTPATIENT)
Dept: LAB | Age: 81
End: 2021-04-12
Attending: INTERNAL MEDICINE
Payer: MEDICARE

## 2021-04-12 DIAGNOSIS — M35.3 PMR (POLYMYALGIA RHEUMATICA) (HCC): ICD-10-CM

## 2021-04-12 DIAGNOSIS — R70.0 ELEVATED SED RATE: ICD-10-CM

## 2021-04-12 PROCEDURE — 86140 C-REACTIVE PROTEIN: CPT

## 2021-04-12 PROCEDURE — 36415 COLL VENOUS BLD VENIPUNCTURE: CPT

## 2021-04-26 ENCOUNTER — PATIENT MESSAGE (OUTPATIENT)
Dept: RHEUMATOLOGY | Facility: CLINIC | Age: 81
End: 2021-04-26

## 2021-04-26 DIAGNOSIS — R70.0 ELEVATED SED RATE: ICD-10-CM

## 2021-04-26 DIAGNOSIS — M35.3 PMR (POLYMYALGIA RHEUMATICA) (HCC): ICD-10-CM

## 2021-04-26 RX ORDER — DEXAMETHASONE 1.5 MG/1
TABLET ORAL
Qty: 90 TABLET | Refills: 0 | Status: SHIPPED | OUTPATIENT
Start: 2021-04-26 | End: 2021-07-23 | Stop reason: DRUGHIGH

## 2021-04-26 NOTE — TELEPHONE ENCOUNTER
From: Karolina Contreras  To: Manolo Torres DO  Sent: 4/26/2021 9:30 AM CDT  Subject: Prescription Question    I am running out of Dexamethasone 1.5 mg. Let me know if I should stop using it or continue taking it.  In the latter case please send for a

## 2021-04-26 NOTE — TELEPHONE ENCOUNTER
Future Appointments   Date Time Provider [facility]   6/28/2021 10:00 AM Jorge Wilcox MD [employer]   7/23/2021 10:00 AM Aiden Lewis MD [facility] [address] [facility]   8/3/2021  9:30 AM Tawnya Abreu DO [facility]   12/14

## 2021-05-20 ENCOUNTER — TELEPHONE (OUTPATIENT)
Dept: RHEUMATOLOGY | Facility: CLINIC | Age: 81
End: 2021-05-20

## 2021-05-20 ENCOUNTER — PATIENT MESSAGE (OUTPATIENT)
Dept: RHEUMATOLOGY | Facility: CLINIC | Age: 81
End: 2021-05-20

## 2021-05-20 DIAGNOSIS — M35.3 PMR (POLYMYALGIA RHEUMATICA) (HCC): Primary | ICD-10-CM

## 2021-05-20 DIAGNOSIS — M35.00 SJOGREN'S SYNDROME WITHOUT EXTRAGLANDULAR INVOLVEMENT (HCC): Primary | ICD-10-CM

## 2021-05-20 DIAGNOSIS — R68.2 DRY MOUTH: ICD-10-CM

## 2021-05-20 RX ORDER — CEVIMELINE HYDROCHLORIDE 30 MG/1
CAPSULE ORAL
Qty: 228 CAPSULE | Refills: 0 | Status: SHIPPED | OUTPATIENT
Start: 2021-05-20 | End: 2021-07-23

## 2021-05-20 NOTE — TELEPHONE ENCOUNTER
Attempted PA on CoverMyMeds; they were unable to do it. Need to call provider.  [employer], spoke to Ed who investigated and then informed me I would need to call pharm benefit at:  [phone]  Spoke to Valeria Mata who stated pt has no pharm benefit

## 2021-05-20 NOTE — TELEPHONE ENCOUNTER
Called pt to review Dr Cat Maynard note re: new medication and directions. She verbalized understanding and agreed to this plan. Again confirmed appt/time/location with Dr Cat Maynard on 5/24/21.

## 2021-05-20 NOTE — TELEPHONE ENCOUNTER
From: Karolina Contreras  To: Chloe Tripp DO  Sent: 5/20/2021 9:14 AM CDT  Subject: Non-Urgent Medical Question    I am having problems with dry eyes and throat, sweating etc. and I do not seem to get a lot of help from dexamethazone.  Please advise

## 2021-05-20 NOTE — TELEPHONE ENCOUNTER
Regarding: Non-Urgent Medical Question  Contact: [phone]  ----- Message from Funmi Mcnally RN sent at 5/20/2021 10:03 AM CDT -----       ----- Message from Baljit Contreras to Nicky Sadler DO sent at 5/20/2021  9:14 AM -----   I am mehreen

## 2021-05-20 NOTE — TELEPHONE ENCOUNTER
Ordered evoxac, Dr. Joycie Bloch wanted the patient to take this in the past.  Unclear if she did. I put in a new prescription she can start taking 1 capsule at night for the first couple weeks to see if she is doing okay.     side effects of pilocarpine or cevim

## 2021-05-20 NOTE — TELEPHONE ENCOUNTER
Can you confirm with the patient much Decadron she is taking. Last telephone note from Dr. Roland Domínguez says half a tablet of the 1.5 mg Decadron. Is she taking the Evoxac? And using artificial tears and restasis?  The decadron will not help with dry eye or mo

## 2021-05-20 NOTE — TELEPHONE ENCOUNTER
Called pt again, she confirmed she is taking 1 decadron, no Evoxac,using artificial tears and restasis. C/O \"extremely\" dry mouth. Agrees to get labs drawn and f/u appt confirmed for Monday 4:20pm 5/24/21 with Dr Ty Cao.

## 2021-05-20 NOTE — TELEPHONE ENCOUNTER
Joanna GANDHI    5/20/21 10:03 AM  Note     LOV 1-13-21         Future Appointments   Date Time Provider Johanne Mckeon   8/3/2021  9:30 AM Brit Parker, DO [facility]      Last CRP 4-12-21; <0.29  Pt taking 1 tab dexamethasone/d     Called pt to

## 2021-05-20 NOTE — TELEPHONE ENCOUNTER
LOV 1-13-21  Future Appointments   Date Time Provider Johanne Mckeon   8/3/2021  9:30 AM Stacy Parker, DO [facility]     Last CRP 4-12-21; <0.29  Pt taking 1 tab dexamethasone/d    Called pt to check status; states she is feeling very weak, pa

## 2021-05-21 ENCOUNTER — LAB ENCOUNTER (OUTPATIENT)
Dept: LAB | Age: 81
End: 2021-05-21
Attending: INTERNAL MEDICINE
Payer: MEDICARE

## 2021-05-21 DIAGNOSIS — M35.3 PMR (POLYMYALGIA RHEUMATICA) (HCC): ICD-10-CM

## 2021-05-21 DIAGNOSIS — D75.89 MACROCYTOSIS: ICD-10-CM

## 2021-05-21 PROCEDURE — 86140 C-REACTIVE PROTEIN: CPT

## 2021-05-21 PROCEDURE — 82607 VITAMIN B-12: CPT

## 2021-05-21 PROCEDURE — 85652 RBC SED RATE AUTOMATED: CPT

## 2021-05-21 PROCEDURE — 85025 COMPLETE CBC W/AUTO DIFF WBC: CPT

## 2021-05-21 PROCEDURE — 80053 COMPREHEN METABOLIC PANEL: CPT

## 2021-05-21 PROCEDURE — 36415 COLL VENOUS BLD VENIPUNCTURE: CPT

## 2021-05-22 ENCOUNTER — TELEPHONE (OUTPATIENT)
Dept: RHEUMATOLOGY | Facility: CLINIC | Age: 81
End: 2021-05-22

## 2021-05-22 DIAGNOSIS — D75.89 MACROCYTOSIS: Primary | ICD-10-CM

## 2021-05-24 ENCOUNTER — OFFICE VISIT (OUTPATIENT)
Dept: RHEUMATOLOGY | Facility: CLINIC | Age: 81
End: 2021-05-24
Payer: MEDICARE

## 2021-05-24 VITALS
BODY MASS INDEX: 29.71 KG/M2 | TEMPERATURE: 98 F | HEART RATE: 70 BPM | WEIGHT: 174 LBS | DIASTOLIC BLOOD PRESSURE: 60 MMHG | HEIGHT: 64 IN | SYSTOLIC BLOOD PRESSURE: 135 MMHG

## 2021-05-24 DIAGNOSIS — Z79.52 LONG TERM CURRENT USE OF SYSTEMIC STEROIDS: ICD-10-CM

## 2021-05-24 DIAGNOSIS — M35.3 PMR (POLYMYALGIA RHEUMATICA) (HCC): Primary | ICD-10-CM

## 2021-05-24 DIAGNOSIS — D75.89 MACROCYTOSIS: ICD-10-CM

## 2021-05-24 DIAGNOSIS — M35.00 SJOGREN'S SYNDROME WITHOUT EXTRAGLANDULAR INVOLVEMENT (HCC): ICD-10-CM

## 2021-05-24 DIAGNOSIS — R79.82 ELEVATED C-REACTIVE PROTEIN: ICD-10-CM

## 2021-05-24 PROCEDURE — 3078F DIAST BP <80 MM HG: CPT | Performed by: INTERNAL MEDICINE

## 2021-05-24 PROCEDURE — 3075F SYST BP GE 130 - 139MM HG: CPT | Performed by: INTERNAL MEDICINE

## 2021-05-24 PROCEDURE — 3008F BODY MASS INDEX DOCD: CPT | Performed by: INTERNAL MEDICINE

## 2021-05-24 PROCEDURE — 99214 OFFICE O/P EST MOD 30 MIN: CPT | Performed by: INTERNAL MEDICINE

## 2021-05-24 RX ORDER — DEXAMETHASONE 0.5 MG/1
TABLET ORAL
Qty: 225 TABLET | Refills: 1 | Status: SHIPPED | OUTPATIENT
Start: 2021-05-24 | End: 2021-06-28

## 2021-05-24 NOTE — PROGRESS NOTES
Rheumatology Follow-up Note  =====================================================================================================    Date of visit: 5/24/2021  ?   Patient presents with:  PMR: Dr. Dariana Engel f/u 01/13/21, medication has not helped with her colonoscopy/endoscopy. -night sweats once every 2-3 days   -Cannot walk as well. Feels weaker  -no shoulder/hip girdle symptoms.      Denies headaches, tenderness jaw claudication, vision changes (diplopia, amaurosis fugax symptoms, visual field changes), • Arthritis    • Back pain    • Benign neoplasm of colon 11/8/2007   • Diarrhea, unspecified    • Eye disease    • Fatigue    • Heart palpitations    • History of cardiac murmur    • History of depression During Covid   • Lung nodule 9/13/2012    Seen on [facility]. MSK: 28 joint count performed. No evidence of synovitis in mcp, pip, dip, wrist, elbows, shoulders, hips, knees, ankles, mtp unless otherwise noted. Full ROM of elbows, wrists, knees. Full range of motion of bilateral shoulders.   Patient with results found for: DRVVT, LAINT, PTTLUPUS, LUPUSINTERP, LA, C9VB8SDSYG, X7EE8FAELM, L1GZURJRGH, B4DBSUCENN  No results found for: CARDIOLIPIGG, CARDIOLIPIGM, CARDIOLIPIGA, CARDIOIGA, CARLIP      Additional Labs:  10/2020    CRP 64.3     07/2020  ESR then take 1.0 mg until next appointment. Dispense 90 day supply. -start evoxac 30 mg with uptitration, already in pharmacy.   -cont restasis  -- for dry eyes, try OTC eye drops: Systane, Refresh or Blink  -- for dry mouth, try biotene products (toothpaste sent.    Thank you for referring this delightful patient to me. Please feel free to contact me with any questions. This report was performed utilizing speech recognition software technology.  Despite proofreading, speech recognition errors could escape

## 2021-05-24 NOTE — PATIENT INSTRUCTIONS
Decadron 0.5 mg: Alternate 1.5 mg with 1 mg x 2-3 weeks, then take 1.0 mg until next appointment. Dispense 90 day supply.      Get labs 1 week before next appointment

## 2021-05-24 NOTE — PROGRESS NOTES
Rheumatology Follow-up Note  =====================================================================================================      Date of visit: 5/24/2021  ? No chief complaint on file.     ?  Gomez Ellis MD    No referring provider defined food and gluten and see if these things help  -- follow up in 6 months or sooner as needed  -- call with questions/concerns      ==============================================================================================================       ESR 81 Used    Vaping Use      Vaping Use: Never used    Alcohol use: No      Alcohol/week: 0.0 standard drinks    Drug use: No    ?  Allergies:  No Known Allergies      Objective    There were no vitals filed for this visit. GEN: NAD, well-nourished.    HEENT: 05/21/2021    CO2 28.0 05/21/2021         Lab Results   Component Value Date    ANAS Positive (A) 05/22/2020     Lab Results   Component Value Date     (H) 06/11/2020    SSB <100 06/11/2020     No results found for: DSDNA, ANTIDSDNA, SMUD, ANTISM, S

## 2021-05-25 ENCOUNTER — TELEPHONE (OUTPATIENT)
Dept: RHEUMATOLOGY | Facility: CLINIC | Age: 81
End: 2021-05-25

## 2021-06-21 ENCOUNTER — LAB ENCOUNTER (OUTPATIENT)
Dept: LAB | Age: 81
End: 2021-06-21
Attending: INTERNAL MEDICINE
Payer: MEDICARE

## 2021-06-21 DIAGNOSIS — D63.8 ANEMIA DUE TO CHRONIC ILLNESS: ICD-10-CM

## 2021-06-21 DIAGNOSIS — M35.3 PMR (POLYMYALGIA RHEUMATICA) (HCC): ICD-10-CM

## 2021-06-21 DIAGNOSIS — D75.89 MACROCYTOSIS: ICD-10-CM

## 2021-06-21 DIAGNOSIS — R79.82 ELEVATED C-REACTIVE PROTEIN: ICD-10-CM

## 2021-06-21 PROCEDURE — 86140 C-REACTIVE PROTEIN: CPT

## 2021-06-21 PROCEDURE — 85652 RBC SED RATE AUTOMATED: CPT

## 2021-06-21 PROCEDURE — 36415 COLL VENOUS BLD VENIPUNCTURE: CPT

## 2021-06-21 PROCEDURE — 82728 ASSAY OF FERRITIN: CPT

## 2021-06-21 PROCEDURE — 84165 PROTEIN E-PHORESIS SERUM: CPT

## 2021-06-21 PROCEDURE — 82746 ASSAY OF FOLIC ACID SERUM: CPT

## 2021-06-21 PROCEDURE — 85025 COMPLETE CBC W/AUTO DIFF WBC: CPT

## 2021-06-21 PROCEDURE — 81001 URINALYSIS AUTO W/SCOPE: CPT

## 2021-06-21 PROCEDURE — 84156 ASSAY OF PROTEIN URINE: CPT

## 2021-06-21 PROCEDURE — 86334 IMMUNOFIX E-PHORESIS SERUM: CPT

## 2021-06-21 PROCEDURE — 83883 ASSAY NEPHELOMETRY NOT SPEC: CPT

## 2021-06-21 PROCEDURE — 80053 COMPREHEN METABOLIC PANEL: CPT

## 2021-06-21 PROCEDURE — 83010 ASSAY OF HAPTOGLOBIN QUANT: CPT

## 2021-06-21 PROCEDURE — 82550 ASSAY OF CK (CPK): CPT

## 2021-06-21 PROCEDURE — 82525 ASSAY OF COPPER: CPT

## 2021-06-28 ENCOUNTER — OFFICE VISIT (OUTPATIENT)
Dept: RHEUMATOLOGY | Facility: CLINIC | Age: 81
End: 2021-06-28
Payer: MEDICARE

## 2021-06-28 ENCOUNTER — OFFICE VISIT (OUTPATIENT)
Dept: HEMATOLOGY/ONCOLOGY | Facility: HOSPITAL | Age: 81
End: 2021-06-28
Attending: INTERNAL MEDICINE
Payer: MEDICARE

## 2021-06-28 VITALS
HEART RATE: 76 BPM | WEIGHT: 175 LBS | RESPIRATION RATE: 16 BRPM | DIASTOLIC BLOOD PRESSURE: 70 MMHG | TEMPERATURE: 97 F | OXYGEN SATURATION: 76 % | HEIGHT: 62.99 IN | SYSTOLIC BLOOD PRESSURE: 154 MMHG | BODY MASS INDEX: 31.01 KG/M2

## 2021-06-28 VITALS
TEMPERATURE: 97 F | WEIGHT: 174 LBS | SYSTOLIC BLOOD PRESSURE: 140 MMHG | HEART RATE: 73 BPM | DIASTOLIC BLOOD PRESSURE: 60 MMHG | HEIGHT: 64 IN | BODY MASS INDEX: 29.71 KG/M2

## 2021-06-28 DIAGNOSIS — M35.00 SJOGREN'S SYNDROME WITHOUT EXTRAGLANDULAR INVOLVEMENT (HCC): ICD-10-CM

## 2021-06-28 DIAGNOSIS — D64.9 ANEMIA, UNSPECIFIED TYPE: Primary | ICD-10-CM

## 2021-06-28 DIAGNOSIS — G72.0 STEROID MYOPATHY: ICD-10-CM

## 2021-06-28 DIAGNOSIS — M35.3 PMR (POLYMYALGIA RHEUMATICA) (HCC): ICD-10-CM

## 2021-06-28 DIAGNOSIS — M35.3 PMR (POLYMYALGIA RHEUMATICA) (HCC): Primary | ICD-10-CM

## 2021-06-28 DIAGNOSIS — R70.0 ELEVATED SED RATE: ICD-10-CM

## 2021-06-28 DIAGNOSIS — R26.9 ABNORMAL GAIT: ICD-10-CM

## 2021-06-28 DIAGNOSIS — T38.0X5A STEROID MYOPATHY: ICD-10-CM

## 2021-06-28 PROCEDURE — 99214 OFFICE O/P EST MOD 30 MIN: CPT | Performed by: INTERNAL MEDICINE

## 2021-06-28 PROCEDURE — 3008F BODY MASS INDEX DOCD: CPT | Performed by: INTERNAL MEDICINE

## 2021-06-28 PROCEDURE — 3078F DIAST BP <80 MM HG: CPT | Performed by: INTERNAL MEDICINE

## 2021-06-28 PROCEDURE — 3077F SYST BP >= 140 MM HG: CPT | Performed by: INTERNAL MEDICINE

## 2021-06-28 RX ORDER — DEXAMETHASONE 0.5 MG/1
TABLET ORAL
Qty: 225 TABLET | Refills: 1 | Status: SHIPPED | OUTPATIENT
Start: 2021-06-28 | End: 2021-09-27 | Stop reason: ALTCHOICE

## 2021-06-28 NOTE — PROGRESS NOTES
Rheumatology Follow-up Note  =====================================================================================================      Date of visit: 6/28/2021  ? Patient presents with: Follow - Up: LOV 05/24/21, has been doing very bad.  Feeling ivone 225 tablet, Rfl: 1  Irbesartan 300 MG Oral Tab, Take 1 tablet (300 mg total) by mouth nightly., Disp: 90 tablet, Rfl: 0  magnesium 30 MG Oral Tab, Take 30 mg by mouth twice a week., Disp: , Rfl:   Ascorbic Acid (VITAMIN C) 1000 MG Oral Tab, Take 1,000 mg b murmur    • History of depression During Covid   • Lung nodule 9/13/2012    Seen on [facility]    • Normal cardiac stress test 9/12    at [facility]   • Other and unspecified hyperlipidemia    • Pain in joints    • Palpitations 8/10/2020   • PMR (polymyalgia r dip, wrist, elbows, shoulders, hips, knees, ankles, mtp unless otherwise noted. Full ROM of elbows, wrists, knees.        ?  Labs:  Lab Results   Component Value Date    WBC 10.6 06/21/2021    RBC 2.95 (L) 06/21/2021    HGB 10.1 (L) 06/21/2021    HCT 31.8 ( Milena , CARLIP      Additional Labs:  10/2020    CRP 64.3     07/2020  ESR 43   CRP normal     06/2020  MADELEINE 1:320 speckled  C3 and C4 normal   positive  SSB negative  CRP normal       05/2020  MADELEINE positive (no titer pr dry eyes, try OTC eye drops: Systane, Refresh or Blink  -- for dry mouth, try biotene products (toothpaste, mouthwash, oral spray or lozenges)  --refer to PT for conditioning and LE weakness; query steroid myopathy.    --cont vit D and calcium  --pt will di

## 2021-06-28 NOTE — PATIENT INSTRUCTIONS
Decadron: Alternate 1.0 mg (Two 0.5 mg tabs)  with 0.5 mg (One 0.5 mg tab) x 3 weeks, then take 0.5 mg until next appointment. For steroid myopathy: schedule physical therapy. [facility] scheduling line to set the appointment up.  Phone number is [phone]

## 2021-06-28 NOTE — PROGRESS NOTES
Cancer Center Progress Note  Patient Name: Alexandria Louis   YOB: 1940   Medical Record Number: [id number]   CSN: [id number]   Attending Physician: aMyo Oro M.D.        Date of Visit: 6/28/2021     Chief Complaint:  Mod[place] Am[place] anemia. History of Present Illness:  Pt is here for follow up. She complains that her legs feel \"weak\". She is unable to walk long distances. No F/C/NS. She has been taking Dexamethasone for her rheumatologic issues. Her hgb has improved to 10. status: Never Smoker      Smokeless tobacco: Never Used    Vaping Use      Vaping Use: Never used    Substance and Sexual Activity      Alcohol use: No        Alcohol/week: 0.0 standard drinks      Drug use: No      Sexual activity: Not Currently    Other mg x 2 weeks, then take 1.0 mg until next appointment. Dispense 90 day supply. , Disp: 225 tablet, Rfl: 1  •  Cevimeline HCl 30 MG Oral Cap, Take 1 capsule (30 mg total) by mouth nightly for 14 days, THEN 1 capsule (30 mg total) 2 (two) times a day for 14 d Allergies     Review of Systems:    Constitutional No fevers, chills, night sweats, excessive fatigue or weight loss. Eyes No significant visual difficulties. No diplopia. No yellowing. Hematologic/Lymphatic Normal - No easy bruising or bleeding.   No t speech. Verbalizes understanding of our discussions today. Lab:    Results for Saba Tellez (MRN [id number]) as of 6/28/2021 05:52   Ref.  Range 6/21/2021 10:08 6/21/2021 10:08   Glucose Latest Ref Range: 70 - 99 mg/dL 88    Sodium Latest Ref Range: 0.62 - 1.70 g/dL  0.85   ALBUMIN/GLOBULIN RATIO Latest Ref Range: 1.00 - 2.00   1.26   SPE INTERPRETATION Unknown  No apparent monoclonal protein on serum electrophoresis. ...    IMMUNOFIXATION Unknown  No monoclonal protein detected by immunofixa PLATELET MORPHOLOGY Latest Ref Range: Normal  Normal    SED RATE Latest Ref Range: 0 - 25 mm/Hr 88 (H)      Radiology:    Pathology:    Impression and Plan:  Anemia of chronic illness: With steroid treatment, her hgb has returned to 10-10.5, which has be

## 2021-07-07 RX ORDER — ESCITALOPRAM OXALATE 5 MG/1
5 TABLET ORAL DAILY
Qty: 30 TABLET | Refills: 0 | OUTPATIENT
Start: 2021-07-07

## 2021-07-12 RX ORDER — ESCITALOPRAM OXALATE 10 MG/1
10 TABLET ORAL DAILY
Qty: 90 TABLET | Refills: 1 | Status: SHIPPED | OUTPATIENT
Start: 2021-07-12 | End: 2021-12-29

## 2021-07-12 NOTE — TELEPHONE ENCOUNTER
Last VISIT 1/18/21 JL Hyperliidemia     Last REFILL 9/18/20 Escitalopram 90T 3R    Last LABS  6/21/21 RBC, Copper, Protein, UA, CRP, Sed Rate, Ferritin, Folic Acid, CBC, CMP    Future Appointments   Date Time Provider Johanne Mckeon   7/23/2021 10:00 AM

## 2021-07-15 ENCOUNTER — TELEPHONE (OUTPATIENT)
Dept: RHEUMATOLOGY | Facility: CLINIC | Age: 81
End: 2021-07-15

## 2021-07-15 NOTE — TELEPHONE ENCOUNTER
Fax from [facility] requesting sig clarification for dexamethasone. Phoned pharmacy, spoke with Ana Pepper. States she understands pt sig, no clarification needed.

## 2021-07-23 ENCOUNTER — OFFICE VISIT (OUTPATIENT)
Dept: INTERNAL MEDICINE CLINIC | Facility: CLINIC | Age: 81
End: 2021-07-23
Payer: MEDICARE

## 2021-07-23 VITALS
TEMPERATURE: 97 F | HEIGHT: 64 IN | HEART RATE: 66 BPM | WEIGHT: 177 LBS | SYSTOLIC BLOOD PRESSURE: 120 MMHG | BODY MASS INDEX: 30.22 KG/M2 | DIASTOLIC BLOOD PRESSURE: 66 MMHG | RESPIRATION RATE: 16 BRPM

## 2021-07-23 DIAGNOSIS — T38.0X5A STEROID-INDUCED MYOPATHY: ICD-10-CM

## 2021-07-23 DIAGNOSIS — F41.9 ANXIETY: ICD-10-CM

## 2021-07-23 DIAGNOSIS — I10 ESSENTIAL HYPERTENSION: Primary | ICD-10-CM

## 2021-07-23 DIAGNOSIS — M35.3 PMR (POLYMYALGIA RHEUMATICA) (HCC): ICD-10-CM

## 2021-07-23 DIAGNOSIS — G72.0 STEROID-INDUCED MYOPATHY: ICD-10-CM

## 2021-07-23 PROCEDURE — 99213 OFFICE O/P EST LOW 20 MIN: CPT | Performed by: INTERNAL MEDICINE

## 2021-07-28 NOTE — PROGRESS NOTES
HPI/Subjective:   Patient ID: Terrence Landon is a [de-identified]year old female.     HPI  Pt seen, is going to [place] in a week as dx with pmr but has not tolerated steriods, question of steriod induced weakness, htn, anxiety is much better on ssri  /66 Constitutional:       Appearance: Normal appearance. HENT:      Head: Normocephalic and atraumatic. Cardiovascular:      Rate and Rhythm: Regular rhythm. Heart sounds: Normal heart sounds.    Neurological:      Mental Status: She is alert and salima

## 2021-09-11 ENCOUNTER — OFFICE VISIT (OUTPATIENT)
Dept: INTERNAL MEDICINE CLINIC | Facility: CLINIC | Age: 81
End: 2021-09-11
Payer: MEDICARE

## 2021-09-11 ENCOUNTER — TELEPHONE (OUTPATIENT)
Dept: INTERNAL MEDICINE CLINIC | Facility: CLINIC | Age: 81
End: 2021-09-11

## 2021-09-11 VITALS
TEMPERATURE: 97 F | HEART RATE: 74 BPM | WEIGHT: 170.63 LBS | HEIGHT: 64 IN | OXYGEN SATURATION: 99 % | RESPIRATION RATE: 18 BRPM | BODY MASS INDEX: 29.13 KG/M2 | SYSTOLIC BLOOD PRESSURE: 126 MMHG | DIASTOLIC BLOOD PRESSURE: 62 MMHG

## 2021-09-11 DIAGNOSIS — M35.3 PMR (POLYMYALGIA RHEUMATICA) (HCC): ICD-10-CM

## 2021-09-11 DIAGNOSIS — I10 PRIMARY HYPERTENSION: ICD-10-CM

## 2021-09-11 DIAGNOSIS — E78.2 MIXED HYPERLIPIDEMIA: Primary | ICD-10-CM

## 2021-09-11 DIAGNOSIS — I25.10 CORONARY ARTERY DISEASE INVOLVING NATIVE CORONARY ARTERY OF NATIVE HEART WITHOUT ANGINA PECTORIS: ICD-10-CM

## 2021-09-11 DIAGNOSIS — D64.9 ANEMIA, UNSPECIFIED TYPE: ICD-10-CM

## 2021-09-11 PROCEDURE — 3078F DIAST BP <80 MM HG: CPT | Performed by: INTERNAL MEDICINE

## 2021-09-11 PROCEDURE — 3074F SYST BP LT 130 MM HG: CPT | Performed by: INTERNAL MEDICINE

## 2021-09-11 PROCEDURE — 3008F BODY MASS INDEX DOCD: CPT | Performed by: INTERNAL MEDICINE

## 2021-09-11 PROCEDURE — 99214 OFFICE O/P EST MOD 30 MIN: CPT | Performed by: INTERNAL MEDICINE

## 2021-09-11 RX ORDER — MAGNESIUM GLUCONATE 30 MG(550)
30 TABLET ORAL 2 TIMES DAILY
COMMUNITY
End: 2021-09-27 | Stop reason: DRUGHIGH

## 2021-09-11 RX ORDER — ROSUVASTATIN CALCIUM 10 MG/1
10 TABLET, COATED ORAL DAILY
COMMUNITY
Start: 2021-08-16 | End: 2021-11-05

## 2021-09-11 RX ORDER — METOPROLOL SUCCINATE 25 MG/1
25 TABLET, EXTENDED RELEASE ORAL DAILY
Qty: 30 TABLET | Refills: 3 | Status: SHIPPED | OUTPATIENT
Start: 2021-09-11 | End: 2021-12-30

## 2021-09-11 NOTE — TELEPHONE ENCOUNTER
Future Appointments   Date Time Provider Johanne Mckeon   1/10/2022 11:20 AM Amari Martinez MD [facility]     Orders to edward- Pt informed that labs need to be completed no sooner than 2 weeks prior to the appt.  Pt aware to fast-no call ba

## 2021-09-27 ENCOUNTER — TELEPHONE (OUTPATIENT)
Dept: INTERNAL MEDICINE CLINIC | Facility: CLINIC | Age: 81
End: 2021-09-27

## 2021-09-27 ENCOUNTER — OFFICE VISIT (OUTPATIENT)
Dept: HEMATOLOGY/ONCOLOGY | Facility: HOSPITAL | Age: 81
End: 2021-09-27
Attending: INTERNAL MEDICINE
Payer: MEDICARE

## 2021-09-27 VITALS
OXYGEN SATURATION: 93 % | BODY MASS INDEX: 29.77 KG/M2 | DIASTOLIC BLOOD PRESSURE: 63 MMHG | TEMPERATURE: 97 F | SYSTOLIC BLOOD PRESSURE: 146 MMHG | WEIGHT: 168 LBS | RESPIRATION RATE: 16 BRPM | HEIGHT: 62.99 IN | HEART RATE: 78 BPM

## 2021-09-27 DIAGNOSIS — D63.8 ANEMIA DUE TO CHRONIC ILLNESS: Primary | ICD-10-CM

## 2021-09-27 DIAGNOSIS — M35.3 PMR (POLYMYALGIA RHEUMATICA) (HCC): ICD-10-CM

## 2021-09-27 LAB
ALBUMIN SERPL-MCNC: 3.1 G/DL (ref 3.4–5)
ALBUMIN/GLOB SERPL: 0.7 {RATIO} (ref 1–2)
ALP LIVER SERPL-CCNC: 57 U/L
ALT SERPL-CCNC: 17 U/L
ANION GAP SERPL CALC-SCNC: 8 MMOL/L (ref 0–18)
AST SERPL-CCNC: 19 U/L (ref 15–37)
BASOPHILS # BLD AUTO: 0.04 X10(3) UL (ref 0–0.2)
BASOPHILS NFR BLD AUTO: 0.4 %
BILIRUB SERPL-MCNC: 1.3 MG/DL (ref 0.1–2)
BUN BLD-MCNC: 15 MG/DL (ref 7–18)
CALCIUM BLD-MCNC: 9.3 MG/DL (ref 8.5–10.1)
CHLORIDE SERPL-SCNC: 107 MMOL/L (ref 98–112)
CO2 SERPL-SCNC: 22 MMOL/L (ref 21–32)
CREAT BLD-MCNC: 0.73 MG/DL
EOSINOPHIL # BLD AUTO: 0.35 X10(3) UL (ref 0–0.7)
EOSINOPHIL NFR BLD AUTO: 3.8 %
ERYTHROCYTE [DISTWIDTH] IN BLOOD BY AUTOMATED COUNT: 22.6 %
GLOBULIN PLAS-MCNC: 4.6 G/DL (ref 2.8–4.4)
GLUCOSE BLD-MCNC: 100 MG/DL (ref 70–99)
HCT VFR BLD AUTO: 28.9 %
HGB BLD-MCNC: 9.3 G/DL
HGB RETIC QN AUTO: 31.4 PG (ref 28.2–36.6)
IMM GRANULOCYTES # BLD AUTO: 0.03 X10(3) UL (ref 0–1)
IMM GRANULOCYTES NFR BLD: 0.3 %
IMM RETICS NFR: 0.28 RATIO (ref 0.1–0.3)
LDH SERPL L TO P-CCNC: 151 U/L
LYMPHOCYTES # BLD AUTO: 2.51 X10(3) UL (ref 1–4)
LYMPHOCYTES NFR BLD AUTO: 27.3 %
MCH RBC QN AUTO: 32.2 PG (ref 26–34)
MCHC RBC AUTO-ENTMCNC: 32.2 G/DL (ref 31–37)
MCV RBC AUTO: 100 FL
MONOCYTES # BLD AUTO: 0.78 X10(3) UL (ref 0.1–1)
MONOCYTES NFR BLD AUTO: 8.5 %
NEUTROPHILS # BLD AUTO: 5.49 X10 (3) UL (ref 1.5–7.7)
NEUTROPHILS # BLD AUTO: 5.49 X10(3) UL (ref 1.5–7.7)
NEUTROPHILS NFR BLD AUTO: 59.7 %
OSMOLALITY SERPL CALC.SUM OF ELEC: 285 MOSM/KG (ref 275–295)
PLATELET # BLD AUTO: 398 10(3)UL (ref 150–450)
POTASSIUM SERPL-SCNC: 3.9 MMOL/L (ref 3.5–5.1)
PROT SERPL-MCNC: 7.7 G/DL (ref 6.4–8.2)
RBC # BLD AUTO: 2.89 X10(6)UL
RETICS # AUTO: 69.4 X10(3) UL (ref 22.5–147.5)
RETICS/RBC NFR AUTO: 2.4 %
SODIUM SERPL-SCNC: 137 MMOL/L (ref 136–145)
WBC # BLD AUTO: 9.2 X10(3) UL (ref 4–11)

## 2021-09-27 PROCEDURE — 99214 OFFICE O/P EST MOD 30 MIN: CPT | Performed by: INTERNAL MEDICINE

## 2021-09-27 NOTE — PROGRESS NOTES
Cancer Center Progress Note  Patient Name: Alexandria Louis   YOB: 1940   Medical Record Number: [id number]   CSN: [id number]   Attending Physician: Mayo Oro M.D.        Date of Visit: 9/27/2021       Chief Complaint:  [place] anemia. History of Present Illness:  Pt is here for follow up. She was seen for an evaluation at the [facility]. The hematologic diagnosis of anemia of chronic illness was confirmed.  They agreed with not starting supplemental BHASKAR, as her hgb was 9.5 th Not on file    Occupational History      Not on file    Tobacco Use      Smoking status: Never Smoker      Smokeless tobacco: Never Used    Vaping Use      Vaping Use: Never used    Substance and Sexual Activity      Alcohol use: No        Alcohol/week: 0. Fear of Current or Ex-Partner: Not on file      Emotionally Abused: Not on file      Physically Abused: Not on file      Sexually Abused: Not on file  Housing Stability:       Unable to Pay for Housing in the Last Year: Not on file      Number of Magdiel Moe Take 5,000 Units by mouth.  Patient's daughter reports patient is taking Vitamin D 2000 units every other day. , Disp: , Rfl:   •  CENTRUM SILVER OR TABS, every other day, Disp: , Rfl:     Allergies:  No Known Allergies     Review of Systems:    Constitutio Integumentary Normal - No rashes and noJaundice   Neurologic Normal - No sensory or motor deficits, normal cerebellar function, normal gait, cranial nerves intact. Psychiatric Normal - A&Ox3. Coherent speech.  Verbalizes understanding of our discussions

## 2021-09-27 NOTE — TELEPHONE ENCOUNTER
Patient states after the fall her ankle and foot became swollen and painful to walk. Pt notified she will need to go to [facility] for evaluation and imaging. Pt verbalizes understanding. FYI to NAVJOT LOV 9/11/21 with NAVJOT.

## 2021-09-28 ENCOUNTER — HOSPITAL ENCOUNTER (OUTPATIENT)
Age: 81
Discharge: HOME | End: 2021-09-28
Attending: EMERGENCY MEDICINE
Payer: MEDICARE

## 2021-09-28 ENCOUNTER — APPOINTMENT (OUTPATIENT)
Dept: GENERAL RADIOLOGY | Age: 81
End: 2021-09-28
Attending: EMERGENCY MEDICINE
Payer: MEDICARE

## 2021-09-28 ENCOUNTER — APPOINTMENT (OUTPATIENT)
Dept: ULTRASOUND IMAGING | Age: 81
End: 2021-09-28
Attending: EMERGENCY MEDICINE
Payer: MEDICARE

## 2021-09-28 VITALS
HEART RATE: 68 BPM | TEMPERATURE: 99 F | BODY MASS INDEX: 30 KG/M2 | SYSTOLIC BLOOD PRESSURE: 123 MMHG | WEIGHT: 168 LBS | RESPIRATION RATE: 18 BRPM | OXYGEN SATURATION: 98 % | DIASTOLIC BLOOD PRESSURE: 58 MMHG

## 2021-09-28 DIAGNOSIS — M79.672 PAIN IN LEFT FOOT: Primary | ICD-10-CM

## 2021-09-28 DIAGNOSIS — M25.476 SOFT TISSUE SWELLING OF JOINT OF FOOT: ICD-10-CM

## 2021-09-28 PROCEDURE — 93971 EXTREMITY STUDY: CPT | Performed by: EMERGENCY MEDICINE

## 2021-09-28 PROCEDURE — 99213 OFFICE O/P EST LOW 20 MIN: CPT

## 2021-09-28 PROCEDURE — 73562 X-RAY EXAM OF KNEE 3: CPT | Performed by: EMERGENCY MEDICINE

## 2021-09-28 PROCEDURE — 73630 X-RAY EXAM OF FOOT: CPT | Performed by: EMERGENCY MEDICINE

## 2021-09-28 PROCEDURE — 99215 OFFICE O/P EST HI 40 MIN: CPT

## 2021-09-28 NOTE — ED PROVIDER NOTES
Patient Seen in: [facility]      History   Patient presents with:  Leg or Foot Injury: Swollen foot, in pain for several days, after earlier fall.  - Entered by patient    Stated Complaint: Leg or Foot Injury - Swollen foot, in pain for se COLONOSCOPY,DIAGNOSTIC  11/29/12    sigmoid polyp, diverticulosis   • OTHER SURGICAL HISTORY     • PAROTID DUCT DIVERT,EXC SUBMAND                  Social History    Tobacco Use      Smoking status: Never Smoker      Smokeless tobacco: Never Used    Vaping B-mode two-dimensional images     of the vascular structures, Doppler spectral analysis, and color flow. Doppler imaging were performed.   The     following veins were imaged:  Common, deep, and superficial femoral,     popliteal, sapheno-femoral junct Osteoarthritic changes are seen along with chronic meniscal     chondrocalcinosis.          Dictated by (CST): Vincenzo Deluca MD on 9/28/2021 at 12:43 PM         Finalized by (CST): Vincenzo Deluca MD on 9/28/2021 at 12:44 PM        XR FOOT, COMPLETE diffusion of the bruise down her leg. Ultrasound was done to rule out DVT. This thankfully is negative. Discussed this with the patient and the patient's . Do think Voltaren gel ice elevation will help with soft tissue swelling.   This could repr

## 2021-09-28 NOTE — ED INITIAL ASSESSMENT (HPI)
Lucy Del Rio four weeks ago. No tx sought. Co left knee pain with impact on fall. Co mild left foot pain after injury. On Sunday, co increased left foot pain.

## 2021-11-09 ENCOUNTER — MED REC SCAN ONLY (OUTPATIENT)
Dept: INTERNAL MEDICINE CLINIC | Facility: CLINIC | Age: 81
End: 2021-11-09

## 2021-11-09 ENCOUNTER — LAB ENCOUNTER (OUTPATIENT)
Dept: LAB | Age: 81
End: 2021-11-09
Attending: INTERNAL MEDICINE
Payer: MEDICARE

## 2021-11-09 ENCOUNTER — IMMUNIZATION (OUTPATIENT)
Dept: INTERNAL MEDICINE CLINIC | Facility: CLINIC | Age: 81
End: 2021-11-09
Payer: MEDICARE

## 2021-11-09 DIAGNOSIS — I10 ESSENTIAL HYPERTENSION: ICD-10-CM

## 2021-11-09 DIAGNOSIS — I25.10 ATHEROSCLEROSIS OF NATIVE CORONARY ARTERY OF NATIVE HEART, UNSPECIFIED WHETHER ANGINA PRESENT: ICD-10-CM

## 2021-11-09 DIAGNOSIS — Z23 NEED FOR VACCINATION: Primary | ICD-10-CM

## 2021-11-09 DIAGNOSIS — E78.00 PURE HYPERCHOLESTEROLEMIA: ICD-10-CM

## 2021-11-09 DIAGNOSIS — R94.39 ABNORMAL CARDIOVASCULAR STRESS TEST: ICD-10-CM

## 2021-11-09 DIAGNOSIS — R00.2 PALPITATIONS: ICD-10-CM

## 2021-11-09 PROCEDURE — 36415 COLL VENOUS BLD VENIPUNCTURE: CPT

## 2021-11-09 PROCEDURE — 90662 IIV NO PRSV INCREASED AG IM: CPT | Performed by: INTERNAL MEDICINE

## 2021-11-09 PROCEDURE — 80053 COMPREHEN METABOLIC PANEL: CPT

## 2021-11-09 PROCEDURE — G0008 ADMIN INFLUENZA VIRUS VAC: HCPCS | Performed by: INTERNAL MEDICINE

## 2021-11-09 PROCEDURE — 80061 LIPID PANEL: CPT

## 2021-11-19 ENCOUNTER — HOSPITAL ENCOUNTER (OUTPATIENT)
Dept: GENERAL RADIOLOGY | Age: 81
Discharge: HOME | End: 2021-11-19
Attending: FAMILY MEDICINE
Payer: MEDICARE

## 2021-11-19 ENCOUNTER — OFFICE VISIT (OUTPATIENT)
Dept: INTERNAL MEDICINE CLINIC | Facility: CLINIC | Age: 81
End: 2021-11-19
Payer: MEDICARE

## 2021-11-19 VITALS
RESPIRATION RATE: 18 BRPM | SYSTOLIC BLOOD PRESSURE: 122 MMHG | OXYGEN SATURATION: 97 % | TEMPERATURE: 97 F | HEIGHT: 63 IN | DIASTOLIC BLOOD PRESSURE: 58 MMHG | HEART RATE: 74 BPM | WEIGHT: 163 LBS | BODY MASS INDEX: 28.88 KG/M2

## 2021-11-19 DIAGNOSIS — M25.561 BILATERAL CHRONIC KNEE PAIN: Primary | ICD-10-CM

## 2021-11-19 DIAGNOSIS — M25.562 BILATERAL CHRONIC KNEE PAIN: ICD-10-CM

## 2021-11-19 DIAGNOSIS — M25.562 BILATERAL CHRONIC KNEE PAIN: Primary | ICD-10-CM

## 2021-11-19 DIAGNOSIS — G89.29 BILATERAL CHRONIC KNEE PAIN: Primary | ICD-10-CM

## 2021-11-19 DIAGNOSIS — T38.0X5A STEROID-INDUCED MYOPATHY: ICD-10-CM

## 2021-11-19 DIAGNOSIS — M25.561 BILATERAL CHRONIC KNEE PAIN: ICD-10-CM

## 2021-11-19 DIAGNOSIS — G89.29 CHRONIC RIGHT SHOULDER PAIN: ICD-10-CM

## 2021-11-19 DIAGNOSIS — G89.29 BILATERAL CHRONIC KNEE PAIN: ICD-10-CM

## 2021-11-19 DIAGNOSIS — G72.0 STEROID-INDUCED MYOPATHY: ICD-10-CM

## 2021-11-19 DIAGNOSIS — M25.511 CHRONIC RIGHT SHOULDER PAIN: ICD-10-CM

## 2021-11-19 DIAGNOSIS — M35.00 SJOGREN'S SYNDROME WITHOUT EXTRAGLANDULAR INVOLVEMENT (HCC): ICD-10-CM

## 2021-11-19 PROCEDURE — 3078F DIAST BP <80 MM HG: CPT | Performed by: FAMILY MEDICINE

## 2021-11-19 PROCEDURE — 3074F SYST BP LT 130 MM HG: CPT | Performed by: FAMILY MEDICINE

## 2021-11-19 PROCEDURE — 73564 X-RAY EXAM KNEE 4 OR MORE: CPT | Performed by: FAMILY MEDICINE

## 2021-11-19 PROCEDURE — 99214 OFFICE O/P EST MOD 30 MIN: CPT | Performed by: FAMILY MEDICINE

## 2021-11-19 PROCEDURE — 3008F BODY MASS INDEX DOCD: CPT | Performed by: FAMILY MEDICINE

## 2021-11-19 PROCEDURE — 73030 X-RAY EXAM OF SHOULDER: CPT | Performed by: FAMILY MEDICINE

## 2021-11-19 NOTE — PROGRESS NOTES
Violette Nicolemadicain  9/18/1940    Patient presents with:  Pain: RG rm 1 shoulder, leg and bilateral knee pains      HPI:   Ashly Jacob Ben is a 80year old female who presents for bilateral lower extremity pain that localizes to her knee in a every other day     • Calcium Carbonate-Vitamin D 500-200 MG-UNIT Oral Tab Take 1 tablet by mouth daily.         No Known Allergies   Past Medical History:   Diagnosis Date   • ANEMIA    • Anemia    • Anemia, unspecified 10/13/2020   • Anorexia 10/13/2020 used    Alcohol use: No      Alcohol/week: 0.0 standard drinks    Drug use: No        REVIEW OF SYSTEMS:   GENERAL: feels well otherwise  SKIN: no rashes  EYES:denies blurred vision or double vision  HEENT: not congested  LUNGS: denies shortness of breath studies. 1. Bilateral chronic knee pain  - XR KNEE, COMPLETE (4 OR MORE VIEWS), LEFT (SOP=16865); Future  - XR KNEE, COMPLETE (4 OR MORE VIEWS), RIGHT (UPN=01301); Future  2.  Chronic right shoulder pain  - XR SHOULDER, COMPLETE (MIN 2 VIEWS), RIGHT (CPT

## 2021-11-24 ENCOUNTER — TELEPHONE (OUTPATIENT)
Dept: INTERNAL MEDICINE CLINIC | Facility: CLINIC | Age: 81
End: 2021-11-24

## 2021-11-24 NOTE — TELEPHONE ENCOUNTER
Pt's  looking for a refill for his wife on below medication - stated she has 5 days left. Les Clarice also requesting a few tablets to hold her over till script comes in from mail order - needs to go to [facility].  Please advise.     Irbesartan 300 MG Oral Ta

## 2021-11-26 RX ORDER — IRBESARTAN 300 MG/1
300 TABLET ORAL NIGHTLY
Qty: 90 TABLET | Refills: 0 | Status: SHIPPED | OUTPATIENT
Start: 2021-11-26

## 2021-11-26 RX ORDER — IRBESARTAN 300 MG/1
300 TABLET ORAL NIGHTLY
Qty: 90 TABLET | Refills: 0 | Status: SHIPPED | OUTPATIENT
Start: 2021-11-26 | End: 2021-11-26

## 2021-12-01 ENCOUNTER — IMMUNIZATION (OUTPATIENT)
Dept: LAB | Facility: HOSPITAL | Age: 81
End: 2021-12-01
Attending: EMERGENCY MEDICINE
Payer: MEDICARE

## 2021-12-01 DIAGNOSIS — Z23 NEED FOR VACCINATION: Primary | ICD-10-CM

## 2021-12-01 PROCEDURE — 0064A SARSCOV2 VAC 50MCG/0.25ML IM: CPT

## 2021-12-29 RX ORDER — ESCITALOPRAM OXALATE 10 MG/1
TABLET ORAL
Qty: 90 TABLET | Refills: 3 | Status: SHIPPED | OUTPATIENT
Start: 2021-12-29

## 2021-12-29 NOTE — TELEPHONE ENCOUNTER
Last visit- 11/19/2021     Last refill- 07/12/2021 escitalopram 10mg QTY90 1R    Last labs- 11/09/2021 lipid, cmp  Ordered by Dami Kyle MD  Future Appointments   Date Time Provider Johanne Linda   1/10/2022 11:20 AM Governor MD [facility]

## 2021-12-30 RX ORDER — METOPROLOL SUCCINATE 25 MG/1
TABLET, EXTENDED RELEASE ORAL
Qty: 30 TABLET | Refills: 0 | Status: SHIPPED | OUTPATIENT
Start: 2021-12-30 | End: 2022-01-31

## 2022-01-10 ENCOUNTER — OFFICE VISIT (OUTPATIENT)
Dept: INTERNAL MEDICINE CLINIC | Facility: CLINIC | Age: 82
End: 2022-01-10
Payer: MEDICARE

## 2022-01-10 ENCOUNTER — NURSE ONLY (OUTPATIENT)
Dept: LAB | Facility: HOSPITAL | Age: 82
End: 2022-01-10
Attending: INTERNAL MEDICINE
Payer: MEDICARE

## 2022-01-10 VITALS
OXYGEN SATURATION: 95 % | TEMPERATURE: 97 F | SYSTOLIC BLOOD PRESSURE: 130 MMHG | HEART RATE: 78 BPM | RESPIRATION RATE: 16 BRPM | WEIGHT: 158 LBS | HEIGHT: 64 IN | BODY MASS INDEX: 26.98 KG/M2 | DIASTOLIC BLOOD PRESSURE: 86 MMHG

## 2022-01-10 DIAGNOSIS — R53.83 FATIGUE, UNSPECIFIED TYPE: ICD-10-CM

## 2022-01-10 DIAGNOSIS — E55.9 VITAMIN D DEFICIENCY: ICD-10-CM

## 2022-01-10 DIAGNOSIS — M85.80 OSTEOPENIA, UNSPECIFIED LOCATION: ICD-10-CM

## 2022-01-10 DIAGNOSIS — K64.1 SECOND DEGREE HEMORRHOIDS: ICD-10-CM

## 2022-01-10 DIAGNOSIS — I10 PRIMARY HYPERTENSION: ICD-10-CM

## 2022-01-10 DIAGNOSIS — F41.9 ANXIETY: ICD-10-CM

## 2022-01-10 DIAGNOSIS — R50.9 FEVER, UNSPECIFIED FEVER CAUSE: ICD-10-CM

## 2022-01-10 DIAGNOSIS — E78.2 MIXED HYPERLIPIDEMIA: ICD-10-CM

## 2022-01-10 DIAGNOSIS — Z00.00 ROUTINE GENERAL MEDICAL EXAMINATION AT A HEALTH CARE FACILITY: Primary | ICD-10-CM

## 2022-01-10 DIAGNOSIS — M35.00 SICCA SYNDROME (HCC): ICD-10-CM

## 2022-01-10 DIAGNOSIS — K57.30 COLON, DIVERTICULOSIS: ICD-10-CM

## 2022-01-10 DIAGNOSIS — D64.9 ANEMIA, UNSPECIFIED TYPE: ICD-10-CM

## 2022-01-10 DIAGNOSIS — D18.03 HEPATIC HEMANGIOMA: ICD-10-CM

## 2022-01-10 DIAGNOSIS — M35.00 SJOGREN'S SYNDROME WITHOUT EXTRAGLANDULAR INVOLVEMENT (HCC): ICD-10-CM

## 2022-01-10 DIAGNOSIS — M79.10 MYALGIA: ICD-10-CM

## 2022-01-10 PROCEDURE — 3075F SYST BP GE 130 - 139MM HG: CPT | Performed by: INTERNAL MEDICINE

## 2022-01-10 PROCEDURE — 99397 PER PM REEVAL EST PAT 65+ YR: CPT | Performed by: INTERNAL MEDICINE

## 2022-01-10 PROCEDURE — G0439 PPPS, SUBSEQ VISIT: HCPCS | Performed by: INTERNAL MEDICINE

## 2022-01-10 PROCEDURE — 96160 PT-FOCUSED HLTH RISK ASSMT: CPT | Performed by: INTERNAL MEDICINE

## 2022-01-10 PROCEDURE — 3079F DIAST BP 80-89 MM HG: CPT | Performed by: INTERNAL MEDICINE

## 2022-01-10 PROCEDURE — 3008F BODY MASS INDEX DOCD: CPT | Performed by: INTERNAL MEDICINE

## 2022-01-10 RX ORDER — ROSUVASTATIN CALCIUM 10 MG/1
10 TABLET, COATED ORAL DAILY
COMMUNITY
Start: 2021-12-30 | End: 2022-01-30

## 2022-01-12 PROBLEM — L82.1 OTHER SEBORRHEIC KERATOSIS: Status: RESOLVED | Noted: 2020-10-08 | Resolved: 2022-01-12

## 2022-01-12 LAB — SARS-COV-2 RNA RESP QL NAA+PROBE: NOT DETECTED

## 2022-01-13 ENCOUNTER — LAB ENCOUNTER (OUTPATIENT)
Dept: LAB | Age: 82
End: 2022-01-13
Attending: INTERNAL MEDICINE
Payer: MEDICARE

## 2022-01-13 DIAGNOSIS — M79.10 MYALGIA: ICD-10-CM

## 2022-01-13 DIAGNOSIS — R50.9 FEVER, UNSPECIFIED FEVER CAUSE: ICD-10-CM

## 2022-01-13 LAB
BASOPHILS # BLD AUTO: 0.05 X10(3) UL (ref 0–0.2)
BASOPHILS NFR BLD AUTO: 0.4 %
BILIRUB UR QL STRIP.AUTO: NEGATIVE
CK SERPL-CCNC: 26 U/L
CLARITY UR REFRACT.AUTO: CLEAR
COLOR UR AUTO: YELLOW
EOSINOPHIL # BLD AUTO: 0.18 X10(3) UL (ref 0–0.7)
EOSINOPHIL NFR BLD AUTO: 1.5 %
ERYTHROCYTE [DISTWIDTH] IN BLOOD BY AUTOMATED COUNT: 25.5 %
ERYTHROCYTE [SEDIMENTATION RATE] IN BLOOD: 106 MM/HR
GLUCOSE UR STRIP.AUTO-MCNC: NEGATIVE MG/DL
HCT VFR BLD AUTO: 27.5 %
HGB BLD-MCNC: 8.5 G/DL
HYALINE CASTS #/AREA URNS AUTO: PRESENT /LPF
IMM GRANULOCYTES # BLD AUTO: 0.08 X10(3) UL (ref 0–1)
IMM GRANULOCYTES NFR BLD: 0.7 %
KETONES UR STRIP.AUTO-MCNC: 15 MG/DL
LEUKOCYTE ESTERASE UR QL STRIP.AUTO: NEGATIVE
LYMPHOCYTES # BLD AUTO: 2.38 X10(3) UL (ref 1–4)
LYMPHOCYTES NFR BLD AUTO: 20.3 %
MCH RBC QN AUTO: 29.9 PG (ref 26–34)
MCHC RBC AUTO-ENTMCNC: 30.9 G/DL (ref 31–37)
MCV RBC AUTO: 96.8 FL
MONOCYTES # BLD AUTO: 1.01 X10(3) UL (ref 0.1–1)
MONOCYTES NFR BLD AUTO: 8.6 %
NEUTROPHILS # BLD AUTO: 8.02 X10 (3) UL (ref 1.5–7.7)
NEUTROPHILS # BLD AUTO: 8.02 X10(3) UL (ref 1.5–7.7)
NEUTROPHILS NFR BLD AUTO: 68.5 %
NITRITE UR QL STRIP.AUTO: NEGATIVE
PH UR STRIP.AUTO: 5.5 [PH] (ref 5–8)
PLATELET # BLD AUTO: 618 10(3)UL (ref 150–450)
PLATELET MORPHOLOGY: NORMAL
RBC # BLD AUTO: 2.84 X10(6)UL
RBC UR QL AUTO: NEGATIVE
SP GR UR STRIP.AUTO: 1.02 (ref 1–1.03)
UROBILINOGEN UR STRIP.AUTO-MCNC: 1 MG/DL
WBC # BLD AUTO: 11.7 X10(3) UL (ref 4–11)

## 2022-01-13 PROCEDURE — 82550 ASSAY OF CK (CPK): CPT

## 2022-01-13 PROCEDURE — 85652 RBC SED RATE AUTOMATED: CPT

## 2022-01-13 PROCEDURE — 81001 URINALYSIS AUTO W/SCOPE: CPT

## 2022-01-13 PROCEDURE — 36415 COLL VENOUS BLD VENIPUNCTURE: CPT

## 2022-01-13 PROCEDURE — 85025 COMPLETE CBC W/AUTO DIFF WBC: CPT

## 2022-01-13 NOTE — PROGRESS NOTES
Subjective:   Patient ID: Toshia Hsu is a 80year old female.     HPI  Here for aha, co fatigue, at end of 40 min video admits had fever 2 days ago but answered no to pre visit covid questions, hyperchol, depression and anxiety doing ok, htn, • acetaminophen 325 MG Oral Tab Take 325 mg by mouth every 6 (six) hours as needed for Pain. • Cholecalciferol (VITAMIN D) 1000 UNIT Oral Cap Take 5,000 Units by mouth.  Patient's daughter reports patient is taking Vitamin D 2000 units every other day heme, has labs ordered by them  Fatigue, unspecified type-check labs, cont meds, fu with rheum and here in 3 months  Sicca syndrome (HCC)-stable, fu with rheum  Fu in 3 months    Orders Placed This Encounter      CK (Creatine Kinase) (Not Creatinine) (E)

## 2022-01-14 ENCOUNTER — TELEPHONE (OUTPATIENT)
Dept: HEMATOLOGY/ONCOLOGY | Facility: HOSPITAL | Age: 82
End: 2022-01-14

## 2022-01-14 NOTE — TELEPHONE ENCOUNTER
Patients  Lisa Bradfordnéstor calling. Had Labs with Dr. Nina Knutson  And Hemoglobin was at 8.5   Would like to get into Dr Lucho De La Cruz. .  First avail was  1/28/2022.     Is there an OTV spot available to see Dr leodan Narvaez advise, and I will schedule

## 2022-01-17 ENCOUNTER — OFFICE VISIT (OUTPATIENT)
Dept: HEMATOLOGY/ONCOLOGY | Facility: HOSPITAL | Age: 82
End: 2022-01-17
Attending: INTERNAL MEDICINE
Payer: MEDICARE

## 2022-01-17 VITALS
DIASTOLIC BLOOD PRESSURE: 72 MMHG | SYSTOLIC BLOOD PRESSURE: 139 MMHG | RESPIRATION RATE: 16 BRPM | OXYGEN SATURATION: 98 % | HEIGHT: 64.02 IN | WEIGHT: 151.63 LBS | HEART RATE: 74 BPM | BODY MASS INDEX: 25.89 KG/M2 | TEMPERATURE: 98 F

## 2022-01-17 DIAGNOSIS — D64.9 ANEMIA, UNSPECIFIED TYPE: Primary | ICD-10-CM

## 2022-01-17 PROBLEM — D63.8 ANEMIA OF CHRONIC DISEASE: Status: ACTIVE | Noted: 2022-01-17

## 2022-01-17 LAB
BASOPHILS # BLD AUTO: 0.04 X10(3) UL (ref 0–0.2)
BASOPHILS NFR BLD AUTO: 0.5 %
DEPRECATED HBV CORE AB SER IA-ACNC: 868.4 NG/ML
EOSINOPHIL # BLD AUTO: 0.36 X10(3) UL (ref 0–0.7)
EOSINOPHIL NFR BLD AUTO: 4.3 %
ERYTHROCYTE [DISTWIDTH] IN BLOOD BY AUTOMATED COUNT: 25.1 %
FOLATE SERPL-MCNC: 33.5 NG/ML (ref 8.7–?)
HCT VFR BLD AUTO: 29 %
HGB BLD-MCNC: 8.7 G/DL
IMM GRANULOCYTES # BLD AUTO: 0.03 X10(3) UL (ref 0–1)
IMM GRANULOCYTES NFR BLD: 0.4 %
IRON SATN MFR SERPL: 22 %
IRON SERPL-MCNC: 74 UG/DL
LYMPHOCYTES # BLD AUTO: 2.64 X10(3) UL (ref 1–4)
LYMPHOCYTES NFR BLD AUTO: 31.7 %
MCH RBC QN AUTO: 28.9 PG (ref 26–34)
MCHC RBC AUTO-ENTMCNC: 30 G/DL (ref 31–37)
MCV RBC AUTO: 96.3 FL
MONOCYTES # BLD AUTO: 0.59 X10(3) UL (ref 0.1–1)
MONOCYTES NFR BLD AUTO: 7.1 %
NEUTROPHILS # BLD AUTO: 4.67 X10 (3) UL (ref 1.5–7.7)
NEUTROPHILS # BLD AUTO: 4.67 X10(3) UL (ref 1.5–7.7)
NEUTROPHILS NFR BLD AUTO: 56 %
PLATELET # BLD AUTO: 604 10(3)UL (ref 150–450)
PLATELET MORPHOLOGY: NORMAL
RBC # BLD AUTO: 3.01 X10(6)UL
TIBC SERPL-MCNC: 340 UG/DL (ref 240–450)
TRANSFERRIN SERPL-MCNC: 228 MG/DL (ref 200–360)
VIT B12 SERPL-MCNC: 1077 PG/ML (ref 193–986)
WBC # BLD AUTO: 8.3 X10(3) UL (ref 4–11)

## 2022-01-17 PROCEDURE — 99214 OFFICE O/P EST MOD 30 MIN: CPT | Performed by: INTERNAL MEDICINE

## 2022-01-17 NOTE — PROGRESS NOTES
Cancer Center Progress Note  Patient Name: Mayda Clark   YOB: 1940   Medical Record Number: [id number]   CSN: [id number]   Attending Physician: Alessandro Garcia M.D.        Date of Visit: 1/17/2022     Chief Complaint:  Raysa Gins anemia. She was seen for an evaluation at the [facility]. The hematologic diagnosis of anemia of chronic illness was confirmed. They agreed with not starting supplemental BHASKAR, as her hgb was 9.5 there.       History of Present Illness:  Pt is here for f Highest education level: Not on file    Occupational History      Not on file    Tobacco Use      Smoking status: Never Smoker      Smokeless tobacco: Never Used    Vaping Use      Vaping Use: Never used    Substance and Sexual Activity      Alcohol use: N (VITAMIN C) 1000 MG Oral Tab, Take 1,000 mg by mouth daily. , Disp: , Rfl:   •  vitamin B-12 50 MCG Oral Tab, Take 50 mcg by mouth daily. , Disp: , Rfl:   •  Calcium Carbonate-Vitamin D 500-200 MG-UNIT Oral Tab, Take 1 tablet by mouth daily. , Disp: , Rfl: Normal - Conjunctivae and sclerae are clear and without icterus. Pupils are reactive and equal.   Hematologic/Lymphatic Normal - No petechiae or purpura. No tender or palpable lymph nodes in the cervical, supraclavicular, axillary or inguinal area.    Resp 0.00 - 0.70 x10(3) uL 0.25 0.35 0.18   Basophils Absolute Latest Ref Range: 0.00 - 0.20 x10(3) uL 0.05 0.04 0.05   Immature Granulocyte Absolute Latest Ref Range: 0.00 - 1.00 x10(3) uL 0.08 0.03 0.08   Neutrophils % Latest Units: % 44.2 59.7 68.5   Lymphoc process is essentially ruled out by the chronicity of the anemia. At her current blood counts, we would not treat a low grade process in the marrow. She has declined a marrow, for now. If her hgb slips further, however, a marrow will be indicated.      Poly

## 2022-01-18 ENCOUNTER — TELEPHONE (OUTPATIENT)
Dept: HEMATOLOGY/ONCOLOGY | Facility: HOSPITAL | Age: 82
End: 2022-01-18

## 2022-01-24 ENCOUNTER — OFFICE VISIT (OUTPATIENT)
Dept: HEMATOLOGY/ONCOLOGY | Facility: HOSPITAL | Age: 82
End: 2022-01-24
Attending: INTERNAL MEDICINE
Payer: MEDICARE

## 2022-01-24 DIAGNOSIS — D63.8 ANEMIA OF CHRONIC DISEASE: Primary | ICD-10-CM

## 2022-01-24 LAB — HGB BLD-MCNC: 9.2 G/DL

## 2022-01-24 PROCEDURE — 96372 THER/PROPH/DIAG INJ SC/IM: CPT

## 2022-01-24 PROCEDURE — 85018 HEMOGLOBIN: CPT

## 2022-01-24 NOTE — PROGRESS NOTES
Education Record    Learner:  Patient    Disease / Diagnosis: Anemia of Chronic Disease    Barriers / Limitations:  None   Comments:    Method:  Brief focused   Comments:    General Topics:  Plan of care reviewed   Comments:    Outcome:  Shows understandin

## 2022-01-29 ENCOUNTER — PATIENT MESSAGE (OUTPATIENT)
Dept: INTERNAL MEDICINE CLINIC | Facility: CLINIC | Age: 82
End: 2022-01-29

## 2022-01-31 ENCOUNTER — OFFICE VISIT (OUTPATIENT)
Dept: HEMATOLOGY/ONCOLOGY | Facility: HOSPITAL | Age: 82
End: 2022-01-31
Attending: INTERNAL MEDICINE
Payer: MEDICARE

## 2022-01-31 VITALS
BODY MASS INDEX: 26 KG/M2 | OXYGEN SATURATION: 95 % | DIASTOLIC BLOOD PRESSURE: 76 MMHG | SYSTOLIC BLOOD PRESSURE: 146 MMHG | WEIGHT: 150 LBS | TEMPERATURE: 98 F | HEART RATE: 79 BPM | RESPIRATION RATE: 18 BRPM

## 2022-01-31 DIAGNOSIS — D63.8 ANEMIA OF CHRONIC DISEASE: Primary | ICD-10-CM

## 2022-01-31 LAB — HGB BLD-MCNC: 8.3 G/DL

## 2022-01-31 PROCEDURE — 36415 COLL VENOUS BLD VENIPUNCTURE: CPT

## 2022-01-31 PROCEDURE — 96372 THER/PROPH/DIAG INJ SC/IM: CPT

## 2022-01-31 PROCEDURE — 85018 HEMOGLOBIN: CPT

## 2022-01-31 RX ORDER — METOPROLOL SUCCINATE 25 MG/1
25 TABLET, EXTENDED RELEASE ORAL DAILY
Qty: 90 TABLET | Refills: 0 | Status: SHIPPED | OUTPATIENT
Start: 2022-01-31

## 2022-01-31 NOTE — PROGRESS NOTES
Education Record    Learner:  Patient    Disease / Diagnosis: Retacrit injection     Barriers / Limitations:  None   Comments:    Method:  Discussion   Comments:    General Topics:  Plan of care reviewed   Comments:    Outcome:  Shows understanding   Dixon Rhodes

## 2022-01-31 NOTE — TELEPHONE ENCOUNTER
Kellen Reaves RN 1/30/2022 8:39 AM CST      ----- Message -----  From: Cherylene Rumple Arzoumanidis  Sent: 1/29/2022 2:24 PM CST  To: [facility] Clinical Staff  Subject: Metoprolol succinate 25 mg     Please order a three months supply from [facility] for A

## 2022-02-07 ENCOUNTER — OFFICE VISIT (OUTPATIENT)
Dept: HEMATOLOGY/ONCOLOGY | Facility: HOSPITAL | Age: 82
End: 2022-02-07
Attending: INTERNAL MEDICINE
Payer: MEDICARE

## 2022-02-07 VITALS
HEIGHT: 64.02 IN | DIASTOLIC BLOOD PRESSURE: 68 MMHG | HEART RATE: 75 BPM | OXYGEN SATURATION: 93 % | BODY MASS INDEX: 25.44 KG/M2 | TEMPERATURE: 98 F | SYSTOLIC BLOOD PRESSURE: 129 MMHG | RESPIRATION RATE: 18 BRPM | WEIGHT: 149 LBS

## 2022-02-07 DIAGNOSIS — D63.8 ANEMIA OF CHRONIC DISEASE: Primary | ICD-10-CM

## 2022-02-07 LAB — HGB BLD-MCNC: 7.8 G/DL

## 2022-02-07 PROCEDURE — 85018 HEMOGLOBIN: CPT

## 2022-02-07 PROCEDURE — 96372 THER/PROPH/DIAG INJ SC/IM: CPT

## 2022-02-07 PROCEDURE — 36415 COLL VENOUS BLD VENIPUNCTURE: CPT

## 2022-02-14 ENCOUNTER — OFFICE VISIT (OUTPATIENT)
Dept: HEMATOLOGY/ONCOLOGY | Facility: HOSPITAL | Age: 82
End: 2022-02-14
Attending: INTERNAL MEDICINE
Payer: MEDICARE

## 2022-02-14 VITALS
OXYGEN SATURATION: 94 % | TEMPERATURE: 98 F | SYSTOLIC BLOOD PRESSURE: 123 MMHG | BODY MASS INDEX: 25.27 KG/M2 | RESPIRATION RATE: 16 BRPM | WEIGHT: 148 LBS | HEART RATE: 77 BPM | HEIGHT: 64.02 IN | DIASTOLIC BLOOD PRESSURE: 69 MMHG

## 2022-02-14 DIAGNOSIS — D63.8 ANEMIA OF CHRONIC DISEASE: Primary | ICD-10-CM

## 2022-02-14 LAB
HGB RETIC QN AUTO: 29.5 PG (ref 28.2–36.6)
IMM RETICS NFR: 0.28 RATIO (ref 0.1–0.3)
RETICS # AUTO: 46.4 X10(3) UL (ref 22.5–147.5)
RETICS/RBC NFR AUTO: 1.5 %

## 2022-02-14 PROCEDURE — 99214 OFFICE O/P EST MOD 30 MIN: CPT | Performed by: INTERNAL MEDICINE

## 2022-02-14 PROCEDURE — 96372 THER/PROPH/DIAG INJ SC/IM: CPT

## 2022-02-14 NOTE — PROGRESS NOTES
Education Record    Learner:  Patient    Disease / Diagnosis: Anemia. Here for Retacrit. Barriers / Limitations:  None   Comments:    Method:  Discussion   Comments:    General Topics:  Medication and Plan of care reviewed   Comments:    Outcome:  Shows understanding   Comments:    Hemoglobin 8.8 today. Patient tolerated injection. Request sent for next appointments. Patient stated she will check my chart for her appointments. Patient discharged in stable condition.

## 2022-02-21 ENCOUNTER — OFFICE VISIT (OUTPATIENT)
Dept: HEMATOLOGY/ONCOLOGY | Facility: HOSPITAL | Age: 82
End: 2022-02-21
Attending: INTERNAL MEDICINE
Payer: MEDICARE

## 2022-02-21 VITALS
RESPIRATION RATE: 18 BRPM | SYSTOLIC BLOOD PRESSURE: 136 MMHG | HEART RATE: 70 BPM | BODY MASS INDEX: 25.44 KG/M2 | HEIGHT: 64.02 IN | OXYGEN SATURATION: 95 % | WEIGHT: 149 LBS | TEMPERATURE: 98 F | DIASTOLIC BLOOD PRESSURE: 71 MMHG

## 2022-02-21 DIAGNOSIS — D63.8 ANEMIA OF CHRONIC DISEASE: Primary | ICD-10-CM

## 2022-02-21 LAB — HGB BLD-MCNC: 8.5 G/DL

## 2022-02-21 PROCEDURE — 85018 HEMOGLOBIN: CPT

## 2022-02-21 PROCEDURE — 96372 THER/PROPH/DIAG INJ SC/IM: CPT

## 2022-02-21 PROCEDURE — 36415 COLL VENOUS BLD VENIPUNCTURE: CPT

## 2022-02-21 NOTE — PROGRESS NOTES
Education Record    Learner:  Patient    Disease / Diagnosis: Anemia. Here for labs and retacrit injection    Barriers / Limitations:  None   Comments:    Method:  Discussion and Printed material   Comments:    General Topics:  Plan of care reviewed   Comments:    Outcome:  Shows understanding   Comments:    Patient tolerated injection well. AVS given. Patient discharged in stable condition.

## 2022-02-24 RX ORDER — IRBESARTAN 300 MG/1
TABLET ORAL
Qty: 90 TABLET | Refills: 3 | Status: SHIPPED | OUTPATIENT
Start: 2022-02-24

## 2022-02-28 ENCOUNTER — OFFICE VISIT (OUTPATIENT)
Dept: HEMATOLOGY/ONCOLOGY | Facility: HOSPITAL | Age: 82
End: 2022-02-28
Attending: INTERNAL MEDICINE
Payer: MEDICARE

## 2022-02-28 VITALS
SYSTOLIC BLOOD PRESSURE: 128 MMHG | DIASTOLIC BLOOD PRESSURE: 76 MMHG | HEART RATE: 68 BPM | BODY MASS INDEX: 25.61 KG/M2 | OXYGEN SATURATION: 94 % | HEIGHT: 64.02 IN | TEMPERATURE: 98 F | WEIGHT: 150 LBS | RESPIRATION RATE: 16 BRPM

## 2022-02-28 DIAGNOSIS — D63.8 ANEMIA OF CHRONIC DISEASE: ICD-10-CM

## 2022-02-28 DIAGNOSIS — M35.3 PMR (POLYMYALGIA RHEUMATICA) (HCC): ICD-10-CM

## 2022-02-28 DIAGNOSIS — D63.8 ANEMIA DUE TO CHRONIC ILLNESS: Primary | ICD-10-CM

## 2022-02-28 LAB
ALBUMIN SERPL-MCNC: 3.2 G/DL (ref 3.4–5)
ALBUMIN/GLOB SERPL: 0.8 {RATIO} (ref 1–2)
ALP LIVER SERPL-CCNC: 64 U/L
ALT SERPL-CCNC: 25 U/L
ANION GAP SERPL CALC-SCNC: 3 MMOL/L (ref 0–18)
AST SERPL-CCNC: 22 U/L (ref 15–37)
BASOPHILS # BLD AUTO: 0.03 X10(3) UL (ref 0–0.2)
BASOPHILS NFR BLD AUTO: 0.4 %
BILIRUB SERPL-MCNC: 1.1 MG/DL (ref 0.1–2)
BUN BLD-MCNC: 17 MG/DL (ref 7–18)
CALCIUM BLD-MCNC: 9.3 MG/DL (ref 8.5–10.1)
CHLORIDE SERPL-SCNC: 105 MMOL/L (ref 98–112)
CO2 SERPL-SCNC: 27 MMOL/L (ref 21–32)
CREAT BLD-MCNC: 0.75 MG/DL
EOSINOPHIL # BLD AUTO: 0.37 X10(3) UL (ref 0–0.7)
EOSINOPHIL NFR BLD AUTO: 5.5 %
ERYTHROCYTE [DISTWIDTH] IN BLOOD BY AUTOMATED COUNT: 26.2 %
FASTING STATUS PATIENT QL REPORTED: NO
GLOBULIN PLAS-MCNC: 4.2 G/DL (ref 2.8–4.4)
GLUCOSE BLD-MCNC: 107 MG/DL (ref 70–99)
HCT VFR BLD AUTO: 27.8 %
HGB BLD-MCNC: 8.8 G/DL
HGB BLD-MCNC: 8.8 G/DL
IMM GRANULOCYTES # BLD AUTO: 0.03 X10(3) UL (ref 0–1)
IMM GRANULOCYTES NFR BLD: 0.4 %
LYMPHOCYTES # BLD AUTO: 2.31 X10(3) UL (ref 1–4)
LYMPHOCYTES NFR BLD AUTO: 34.3 %
MCH RBC QN AUTO: 29.6 PG (ref 26–34)
MCHC RBC AUTO-ENTMCNC: 31.7 G/DL (ref 31–37)
MCV RBC AUTO: 93.6 FL
MONOCYTES # BLD AUTO: 0.66 X10(3) UL (ref 0.1–1)
MONOCYTES NFR BLD AUTO: 9.8 %
NEUTROPHILS # BLD AUTO: 3.33 X10 (3) UL (ref 1.5–7.7)
NEUTROPHILS # BLD AUTO: 3.33 X10(3) UL (ref 1.5–7.7)
NEUTROPHILS NFR BLD AUTO: 49.6 %
OSMOLALITY SERPL CALC.SUM OF ELEC: 282 MOSM/KG (ref 275–295)
PLATELET # BLD AUTO: 337 10(3)UL (ref 150–450)
PLATELET MORPHOLOGY: NORMAL
POTASSIUM SERPL-SCNC: 4.7 MMOL/L (ref 3.5–5.1)
PROT SERPL-MCNC: 7.4 G/DL (ref 6.4–8.2)
RBC # BLD AUTO: 2.97 X10(6)UL
SODIUM SERPL-SCNC: 135 MMOL/L (ref 136–145)
WBC # BLD AUTO: 6.7 X10(3) UL (ref 4–11)

## 2022-02-28 PROCEDURE — 96372 THER/PROPH/DIAG INJ SC/IM: CPT

## 2022-02-28 PROCEDURE — 85018 HEMOGLOBIN: CPT

## 2022-02-28 PROCEDURE — 80053 COMPREHEN METABOLIC PANEL: CPT

## 2022-02-28 PROCEDURE — 85025 COMPLETE CBC W/AUTO DIFF WBC: CPT

## 2022-02-28 PROCEDURE — 36415 COLL VENOUS BLD VENIPUNCTURE: CPT

## 2022-03-04 ENCOUNTER — TELEPHONE (OUTPATIENT)
Dept: INTERNAL MEDICINE CLINIC | Facility: CLINIC | Age: 82
End: 2022-03-04

## 2022-03-04 NOTE — TELEPHONE ENCOUNTER
Pt needs parking placard form filled out and signed by MD.     I pre-filled pts information on the form and gave it to Shayy.  She is to have pt sign form and place with JL for signature

## 2022-03-07 ENCOUNTER — OFFICE VISIT (OUTPATIENT)
Dept: HEMATOLOGY/ONCOLOGY | Facility: HOSPITAL | Age: 82
End: 2022-03-07
Attending: INTERNAL MEDICINE
Payer: MEDICARE

## 2022-03-07 VITALS
RESPIRATION RATE: 16 BRPM | HEART RATE: 70 BPM | OXYGEN SATURATION: 96 % | TEMPERATURE: 97 F | BODY MASS INDEX: 26 KG/M2 | WEIGHT: 149 LBS | SYSTOLIC BLOOD PRESSURE: 147 MMHG | DIASTOLIC BLOOD PRESSURE: 76 MMHG

## 2022-03-07 DIAGNOSIS — D63.8 ANEMIA OF CHRONIC DISEASE: Primary | ICD-10-CM

## 2022-03-07 LAB — HGB BLD-MCNC: 9.1 G/DL

## 2022-03-07 PROCEDURE — 36415 COLL VENOUS BLD VENIPUNCTURE: CPT

## 2022-03-07 PROCEDURE — 96372 THER/PROPH/DIAG INJ SC/IM: CPT

## 2022-03-07 PROCEDURE — 85018 HEMOGLOBIN: CPT

## 2022-03-07 NOTE — PROGRESS NOTES
Education Record    Learner:  Patient    Disease / Diagnosis: here for retacrit    Barriers / Limitations:  None   Comments:    Method:  Discussion   Comments:    General Topics:  Plan of care reviewed   Comments:    Outcome:  Shows understanding   Comments:    HGB 9.1. injection given and tolerated well. Declined printed AVS. Upcoming appointments reviewed. Discharged home in stable condition, no new complaints.

## 2022-03-14 ENCOUNTER — OFFICE VISIT (OUTPATIENT)
Dept: HEMATOLOGY/ONCOLOGY | Facility: HOSPITAL | Age: 82
End: 2022-03-14
Attending: INTERNAL MEDICINE
Payer: MEDICARE

## 2022-03-14 VITALS
HEIGHT: 64.02 IN | HEART RATE: 71 BPM | BODY MASS INDEX: 25.67 KG/M2 | RESPIRATION RATE: 16 BRPM | DIASTOLIC BLOOD PRESSURE: 81 MMHG | SYSTOLIC BLOOD PRESSURE: 166 MMHG | WEIGHT: 150.38 LBS | TEMPERATURE: 98 F | OXYGEN SATURATION: 95 %

## 2022-03-14 DIAGNOSIS — D63.8 ANEMIA OF CHRONIC DISEASE: Primary | ICD-10-CM

## 2022-03-14 DIAGNOSIS — D63.8 ANEMIA OF CHRONIC DISEASE: ICD-10-CM

## 2022-03-14 LAB — HGB BLD-MCNC: 9 G/DL

## 2022-03-14 PROCEDURE — 96372 THER/PROPH/DIAG INJ SC/IM: CPT

## 2022-03-14 PROCEDURE — 99213 OFFICE O/P EST LOW 20 MIN: CPT | Performed by: INTERNAL MEDICINE

## 2022-03-14 NOTE — PROGRESS NOTES
Education Record    Learner:  Patient    Disease / Diagnosis: Anemia of Chronic Disease    Barriers / Limitations:  None   Comments:    Method:  Brief focused and Reinforcement   Comments:    General Topics:  Plan of care reviewed   Comments:    Outcome:  Shows understanding   Comments:  Dr. Ghislaine Blackman increased Retacrit dose from 20,000 units weekly to 30,000 units weekly. Patient aware of this plan of care and verbalized understanding. All appointments made for the patient. Uses MyChart.

## 2022-03-14 NOTE — PROGRESS NOTES
Outpatient Oncology Care Plan  Problem list:  knowledge deficit    Problems related to:    disease/disease progression    Interventions:  provided general teaching    Expected outcomes:  understands plan of care    Progress towards outcome:  making progress    Education Record    Learner:  Patient and Family Member  Barriers / Limitations:  None  Method:  Brief focused  Outcome:  Shows understanding  Comments:  Pt here for follow up and injection. She complains of fatigue.

## 2022-03-21 ENCOUNTER — OFFICE VISIT (OUTPATIENT)
Dept: HEMATOLOGY/ONCOLOGY | Facility: HOSPITAL | Age: 82
End: 2022-03-21
Attending: INTERNAL MEDICINE
Payer: MEDICARE

## 2022-03-21 DIAGNOSIS — D63.8 ANEMIA OF CHRONIC DISEASE: Primary | ICD-10-CM

## 2022-03-21 LAB — HGB BLD-MCNC: 9.8 G/DL

## 2022-03-21 PROCEDURE — 96372 THER/PROPH/DIAG INJ SC/IM: CPT

## 2022-03-21 PROCEDURE — 85018 HEMOGLOBIN: CPT

## 2022-03-21 NOTE — PROGRESS NOTES
Education Record    Learner:  Patient    Disease / Debbi Chavez    Barriers / Limitations:  None   Comments:    Method:  Brief focused   Comments:    General Topics:  Infection, Medication, Pain, Precautions, Procedure, Side effects and symptom management, Plan of care reviewed and Fall risk and prevention   Comments:    Outcome:  Shows understanding   Comments:  received Retacrit 30 000 units for Hg=9.8

## 2022-03-28 ENCOUNTER — OFFICE VISIT (OUTPATIENT)
Dept: HEMATOLOGY/ONCOLOGY | Facility: HOSPITAL | Age: 82
End: 2022-03-28
Attending: INTERNAL MEDICINE
Payer: MEDICARE

## 2022-03-28 VITALS
HEIGHT: 64.02 IN | WEIGHT: 149.38 LBS | HEART RATE: 57 BPM | SYSTOLIC BLOOD PRESSURE: 148 MMHG | OXYGEN SATURATION: 96 % | TEMPERATURE: 97 F | RESPIRATION RATE: 16 BRPM | BODY MASS INDEX: 25.5 KG/M2 | DIASTOLIC BLOOD PRESSURE: 75 MMHG

## 2022-03-28 DIAGNOSIS — D63.8 ANEMIA OF CHRONIC DISEASE: Primary | ICD-10-CM

## 2022-03-28 LAB — HGB BLD-MCNC: 10.2 G/DL

## 2022-03-28 PROCEDURE — 36415 COLL VENOUS BLD VENIPUNCTURE: CPT

## 2022-03-28 PROCEDURE — 85018 HEMOGLOBIN: CPT

## 2022-04-04 ENCOUNTER — OFFICE VISIT (OUTPATIENT)
Dept: HEMATOLOGY/ONCOLOGY | Facility: HOSPITAL | Age: 82
End: 2022-04-04
Attending: INTERNAL MEDICINE
Payer: MEDICARE

## 2022-04-04 VITALS
RESPIRATION RATE: 18 BRPM | OXYGEN SATURATION: 96 % | HEIGHT: 64.02 IN | DIASTOLIC BLOOD PRESSURE: 74 MMHG | TEMPERATURE: 98 F | WEIGHT: 148.81 LBS | HEART RATE: 69 BPM | SYSTOLIC BLOOD PRESSURE: 151 MMHG | BODY MASS INDEX: 25.41 KG/M2

## 2022-04-04 DIAGNOSIS — D63.8 ANEMIA OF CHRONIC DISEASE: Primary | ICD-10-CM

## 2022-04-04 DIAGNOSIS — M35.00 SJOGREN'S SYNDROME WITHOUT EXTRAGLANDULAR INVOLVEMENT (HCC): ICD-10-CM

## 2022-04-04 LAB
BILIRUB UR QL STRIP.AUTO: NEGATIVE
C3 SERPL-MCNC: 110 MG/DL (ref 90–180)
C4 SERPL-MCNC: 33.4 MG/DL (ref 10–40)
COLOR UR AUTO: YELLOW
CREAT UR-SCNC: 128 MG/DL
CRP SERPL-MCNC: <0.29 MG/DL (ref ?–0.3)
ERYTHROCYTE [SEDIMENTATION RATE] IN BLOOD: 86 MM/HR
GLUCOSE UR STRIP.AUTO-MCNC: NEGATIVE MG/DL
HGB BLD-MCNC: 9.9 G/DL
HYALINE CASTS #/AREA URNS AUTO: PRESENT /LPF
KETONES UR STRIP.AUTO-MCNC: NEGATIVE MG/DL
LEUKOCYTE ESTERASE UR QL STRIP.AUTO: NEGATIVE
PH UR STRIP.AUTO: 5 [PH] (ref 5–8)
PROT UR STRIP.AUTO-MCNC: NEGATIVE MG/DL
PROT UR-MCNC: 23.3 MG/DL
RBC UR QL AUTO: NEGATIVE
SP GR UR STRIP.AUTO: 1.02 (ref 1–1.03)
UROBILINOGEN UR STRIP.AUTO-MCNC: <2 MG/DL

## 2022-04-04 PROCEDURE — 84156 ASSAY OF PROTEIN URINE: CPT

## 2022-04-04 PROCEDURE — 36415 COLL VENOUS BLD VENIPUNCTURE: CPT

## 2022-04-04 PROCEDURE — 85652 RBC SED RATE AUTOMATED: CPT

## 2022-04-04 PROCEDURE — 86160 COMPLEMENT ANTIGEN: CPT

## 2022-04-04 PROCEDURE — 82570 ASSAY OF URINE CREATININE: CPT

## 2022-04-04 PROCEDURE — 81001 URINALYSIS AUTO W/SCOPE: CPT

## 2022-04-04 PROCEDURE — 86140 C-REACTIVE PROTEIN: CPT

## 2022-04-04 PROCEDURE — 85018 HEMOGLOBIN: CPT

## 2022-04-04 PROCEDURE — 96372 THER/PROPH/DIAG INJ SC/IM: CPT

## 2022-04-04 NOTE — PROGRESS NOTES
Education Record    Learner:  Patient    Disease / Diagnosis: Pt here for retacrit injection    Barriers / Limitations:  None    Method:  Brief focused, printed material and  reinforcement    General Topics:  Plan of care reviewed    Outcome:  Shows understanding    Pt tolerated injection. Pt has no complaints.

## 2022-04-06 LAB
M TB IFN-G CD4+ T-CELLS BLD-ACNC: 0.03 IU/ML
M TB TUBERC IFN-G BLD QL: NEGATIVE
M TB TUBERC IGNF/MITOGEN IGNF CONTROL: >10 IU/ML
QFT TB1 AG MINUS NIL: 0.01 IU/ML
QFT TB2 AG MINUS NIL: 0 IU/ML

## 2022-04-11 ENCOUNTER — OFFICE VISIT (OUTPATIENT)
Dept: HEMATOLOGY/ONCOLOGY | Facility: HOSPITAL | Age: 82
End: 2022-04-11
Attending: INTERNAL MEDICINE
Payer: MEDICARE

## 2022-04-11 VITALS
SYSTOLIC BLOOD PRESSURE: 157 MMHG | BODY MASS INDEX: 25.27 KG/M2 | WEIGHT: 148 LBS | TEMPERATURE: 98 F | HEART RATE: 75 BPM | DIASTOLIC BLOOD PRESSURE: 75 MMHG | OXYGEN SATURATION: 96 % | RESPIRATION RATE: 18 BRPM | HEIGHT: 64.02 IN

## 2022-04-11 DIAGNOSIS — D63.8 ANEMIA OF CHRONIC DISEASE: Primary | ICD-10-CM

## 2022-04-11 LAB — HGB BLD-MCNC: 10 G/DL

## 2022-04-11 PROCEDURE — 85018 HEMOGLOBIN: CPT

## 2022-04-11 PROCEDURE — 36415 COLL VENOUS BLD VENIPUNCTURE: CPT

## 2022-04-11 NOTE — PROGRESS NOTES
Education Record    Learner:  Patient    Disease / Diagnosis: here for retacrit    Barriers / Limitations:  None   Comments:    Method:  Discussion   Comments:    General Topics:  Plan of care reviewed   Comments:    Outcome:  Shows understanding   Comments:  HGB 10. Hold retacrit today per Truong Lyon NP. Pt informed. Reviewed next appointment. Declined printed AVS. Discharged home in stable condition, no new complaints.

## 2022-04-15 ENCOUNTER — OFFICE VISIT (OUTPATIENT)
Dept: SURGERY | Facility: CLINIC | Age: 82
End: 2022-04-15
Payer: MEDICARE

## 2022-04-15 DIAGNOSIS — R31.29 MICROSCOPIC HEMATURIA: ICD-10-CM

## 2022-04-15 DIAGNOSIS — R82.90 URINE FINDING: Primary | ICD-10-CM

## 2022-04-15 LAB
APPEARANCE: CLEAR
BILIRUB UR QL: NEGATIVE
BILIRUBIN: NEGATIVE
CLARITY UR: CLEAR
COLOR UR: YELLOW
GLUCOSE (URINE DIPSTICK): NEGATIVE MG/DL
GLUCOSE UR-MCNC: NEGATIVE MG/DL
HGB UR QL STRIP.AUTO: NEGATIVE
HYALINE CASTS #/AREA URNS AUTO: PRESENT /LPF
KETONES (URINE DIPSTICK): NEGATIVE MG/DL
LEUKOCYTE ESTERASE UR QL STRIP.AUTO: NEGATIVE
LEUKOCYTES: NEGATIVE
MULTISTIX LOT#: NORMAL NUMERIC
NITRITE UR QL STRIP.AUTO: NEGATIVE
NITRITE, URINE: NEGATIVE
OCCULT BLOOD: NEGATIVE
PH UR: 5 [PH] (ref 5–8)
PH, URINE: 5 (ref 4.5–8)
PROT UR-MCNC: NEGATIVE MG/DL
PROTEIN (URINE DIPSTICK): NEGATIVE MG/DL
SP GR UR STRIP: 1.02 (ref 1–1.03)
SPECIFIC GRAVITY: 1.03 (ref 1–1.03)
URINE-COLOR: YELLOW
UROBILINOGEN UR STRIP-ACNC: <2
UROBILINOGEN,SEMI-QN: 0.2 MG/DL (ref 0–1.9)
VIT C UR-MCNC: 40 MG/DL

## 2022-04-15 PROCEDURE — 88108 CYTOPATH CONCENTRATE TECH: CPT | Performed by: UROLOGY

## 2022-04-15 PROCEDURE — 81001 URINALYSIS AUTO W/SCOPE: CPT | Performed by: UROLOGY

## 2022-04-18 ENCOUNTER — OFFICE VISIT (OUTPATIENT)
Dept: HEMATOLOGY/ONCOLOGY | Facility: HOSPITAL | Age: 82
End: 2022-04-18
Attending: INTERNAL MEDICINE
Payer: MEDICARE

## 2022-04-18 VITALS
DIASTOLIC BLOOD PRESSURE: 82 MMHG | OXYGEN SATURATION: 95 % | HEART RATE: 70 BPM | SYSTOLIC BLOOD PRESSURE: 151 MMHG | TEMPERATURE: 98 F | RESPIRATION RATE: 18 BRPM

## 2022-04-18 DIAGNOSIS — D63.8 ANEMIA OF CHRONIC DISEASE: ICD-10-CM

## 2022-04-18 LAB — HEMOGLOBIN POC: 11.2 G/DL

## 2022-04-18 PROCEDURE — 36416 COLLJ CAPILLARY BLOOD SPEC: CPT

## 2022-04-18 PROCEDURE — 85018 HEMOGLOBIN: CPT

## 2022-04-18 NOTE — PROGRESS NOTES
Patient here for possible retacrit. Per sheron Borrero to use point of care hemoglobin test. Hgb 11.2. No retacrit needed. Pt discharged in stable condition.
Spontaneous, unlabored and symmetrical

## 2022-04-19 LAB — NON GYNE INTERPRETATION: NEGATIVE

## 2022-04-25 ENCOUNTER — APPOINTMENT (OUTPATIENT)
Dept: HEMATOLOGY/ONCOLOGY | Facility: HOSPITAL | Age: 82
End: 2022-04-25
Attending: INTERNAL MEDICINE
Payer: MEDICARE

## 2022-04-25 VITALS
SYSTOLIC BLOOD PRESSURE: 130 MMHG | DIASTOLIC BLOOD PRESSURE: 70 MMHG | RESPIRATION RATE: 16 BRPM | OXYGEN SATURATION: 94 % | HEART RATE: 75 BPM | TEMPERATURE: 98 F

## 2022-04-25 DIAGNOSIS — D63.8 ANEMIA OF CHRONIC DISEASE: Primary | ICD-10-CM

## 2022-04-25 LAB — HGB BLD-MCNC: 9.5 G/DL

## 2022-04-25 PROCEDURE — 96372 THER/PROPH/DIAG INJ SC/IM: CPT

## 2022-04-25 PROCEDURE — 36415 COLL VENOUS BLD VENIPUNCTURE: CPT

## 2022-04-27 RX ORDER — METOPROLOL SUCCINATE 25 MG/1
TABLET, EXTENDED RELEASE ORAL
Qty: 90 TABLET | Refills: 3 | Status: SHIPPED | OUTPATIENT
Start: 2022-04-27

## 2022-04-27 NOTE — TELEPHONE ENCOUNTER
Last visit- 01/10/2022 cpe seen by NAVJOT    Last refill- 01/31/2022 metoprolol succinate 25mg QTY90 0R    Last labs- 02/28/2022 cmp, cbc  Ordered by Aruna Carrion MD  Future Appointments   Date Time Provider Johanne Mckeon   5/2/2022 11:00 AM [address] [place] [address]   5/9/2022 11:00 AM [address] [place] [address]   5/16/2022 11:00 AM [address] [place] [address]   5/19/2022  2:00 PM Niraj Sánchez MD [facility] EC [address]   5/23/2022 11:00 AM [facility] [place] RN1 [address] [place] [address]   5/31/2022 11:00 AM [facility] [place] RN5 [facility] CHEMO [facility]   6/6/2022 11:00 AM [facility] [place] RN1 [address] [place] [address]   6/13/2022 10:45 AM Diamond Wilcox MD [facility]   6/13/2022 11:00 AM [address] [place] [address]       Per Protocol- Passed

## 2022-05-02 ENCOUNTER — OFFICE VISIT (OUTPATIENT)
Dept: HEMATOLOGY/ONCOLOGY | Facility: HOSPITAL | Age: 82
End: 2022-05-02
Attending: INTERNAL MEDICINE
Payer: MEDICARE

## 2022-05-02 VITALS
SYSTOLIC BLOOD PRESSURE: 105 MMHG | RESPIRATION RATE: 16 BRPM | TEMPERATURE: 97 F | OXYGEN SATURATION: 96 % | DIASTOLIC BLOOD PRESSURE: 57 MMHG | WEIGHT: 148 LBS | BODY MASS INDEX: 25.27 KG/M2 | HEIGHT: 64.02 IN | HEART RATE: 66 BPM

## 2022-05-02 DIAGNOSIS — D63.8 ANEMIA OF CHRONIC DISEASE: ICD-10-CM

## 2022-05-02 LAB — HGB BLD-MCNC: 10 G/DL

## 2022-05-02 PROCEDURE — 36415 COLL VENOUS BLD VENIPUNCTURE: CPT

## 2022-05-02 PROCEDURE — 85018 HEMOGLOBIN: CPT

## 2022-05-02 NOTE — PROGRESS NOTES
No Retacrit given today due to hgb of 10.0. Patient will return to clinic next week for CBC and possible Retacrit. Patient discharged in stable condition.

## 2022-05-09 ENCOUNTER — OFFICE VISIT (OUTPATIENT)
Dept: HEMATOLOGY/ONCOLOGY | Facility: HOSPITAL | Age: 82
End: 2022-05-09
Attending: INTERNAL MEDICINE
Payer: MEDICARE

## 2022-05-09 DIAGNOSIS — D63.8 ANEMIA OF CHRONIC DISEASE: Primary | ICD-10-CM

## 2022-05-09 LAB — HGB BLD-MCNC: 9.5 G/DL

## 2022-05-09 PROCEDURE — 85018 HEMOGLOBIN: CPT

## 2022-05-09 PROCEDURE — 96372 THER/PROPH/DIAG INJ SC/IM: CPT

## 2022-05-09 NOTE — PROGRESS NOTES
Education Record    Learner:  Patient    Disease / Diagnosis: anemia: retacrit     Barriers / Limitations:  None   Comments:    Method:  Brief focused and Discussion   Comments:    General Topics:  Side effects and symptom management   Comments:    Outcome:  Shows understanding   Comments:  Hemoglobin 9.5 today. Retacrit given per order. Patient discharged in stable condition.

## 2022-05-16 ENCOUNTER — OFFICE VISIT (OUTPATIENT)
Dept: HEMATOLOGY/ONCOLOGY | Facility: HOSPITAL | Age: 82
End: 2022-05-16
Attending: INTERNAL MEDICINE
Payer: MEDICARE

## 2022-05-16 LAB — HEMOGLOBIN POC: 10.3 G/DL

## 2022-05-16 PROCEDURE — 85018 HEMOGLOBIN: CPT

## 2022-05-16 PROCEDURE — 36416 COLLJ CAPILLARY BLOOD SPEC: CPT

## 2022-05-19 ENCOUNTER — PROCEDURE (OUTPATIENT)
Dept: SURGERY | Facility: CLINIC | Age: 82
End: 2022-05-19
Payer: MEDICARE

## 2022-05-19 VITALS — DIASTOLIC BLOOD PRESSURE: 72 MMHG | SYSTOLIC BLOOD PRESSURE: 117 MMHG | HEART RATE: 67 BPM

## 2022-05-19 DIAGNOSIS — R31.29 MICROSCOPIC HEMATURIA: ICD-10-CM

## 2022-05-19 DIAGNOSIS — R82.90 URINE FINDING: Primary | ICD-10-CM

## 2022-05-19 LAB
APPEARANCE: CLEAR
BILIRUBIN: NEGATIVE
GLUCOSE (URINE DIPSTICK): NEGATIVE MG/DL
KETONES (URINE DIPSTICK): NEGATIVE MG/DL
LEUKOCYTES: NEGATIVE
MULTISTIX LOT#: ABNORMAL NUMERIC
NITRITE, URINE: NEGATIVE
PH, URINE: 5.5 (ref 4.5–8)
PROTEIN (URINE DIPSTICK): NEGATIVE MG/DL
SPECIFIC GRAVITY: 1.02 (ref 1–1.03)
URINE-COLOR: YELLOW
UROBILINOGEN,SEMI-QN: 0.2 MG/DL (ref 0–1.9)

## 2022-05-19 PROCEDURE — 52000 CYSTOURETHROSCOPY: CPT | Performed by: UROLOGY

## 2022-05-19 PROCEDURE — 3078F DIAST BP <80 MM HG: CPT | Performed by: UROLOGY

## 2022-05-19 PROCEDURE — 3074F SYST BP LT 130 MM HG: CPT | Performed by: UROLOGY

## 2022-05-19 PROCEDURE — 81003 URINALYSIS AUTO W/O SCOPE: CPT | Performed by: UROLOGY

## 2022-05-19 RX ORDER — CIPROFLOXACIN 500 MG/1
500 TABLET, FILM COATED ORAL ONCE
Status: COMPLETED | OUTPATIENT
Start: 2022-05-19 | End: 2022-05-19

## 2022-05-19 RX ADMIN — CIPROFLOXACIN 500 MG: 500 TABLET, FILM COATED ORAL at 15:23:00

## 2022-05-23 ENCOUNTER — OFFICE VISIT (OUTPATIENT)
Dept: HEMATOLOGY/ONCOLOGY | Facility: HOSPITAL | Age: 82
End: 2022-05-23
Attending: INTERNAL MEDICINE
Payer: MEDICARE

## 2022-05-23 VITALS
TEMPERATURE: 98 F | BODY MASS INDEX: 24.79 KG/M2 | HEART RATE: 77 BPM | DIASTOLIC BLOOD PRESSURE: 77 MMHG | WEIGHT: 145.19 LBS | SYSTOLIC BLOOD PRESSURE: 143 MMHG | RESPIRATION RATE: 18 BRPM | OXYGEN SATURATION: 96 % | HEIGHT: 64.02 IN

## 2022-05-23 DIAGNOSIS — D63.8 ANEMIA OF CHRONIC DISEASE: Primary | ICD-10-CM

## 2022-05-23 LAB — HGB BLD-MCNC: 10.3 G/DL

## 2022-05-23 PROCEDURE — 85018 HEMOGLOBIN: CPT

## 2022-05-23 PROCEDURE — 36415 COLL VENOUS BLD VENIPUNCTURE: CPT

## 2022-05-31 ENCOUNTER — APPOINTMENT (OUTPATIENT)
Dept: HEMATOLOGY/ONCOLOGY | Facility: HOSPITAL | Age: 82
End: 2022-05-31
Attending: INTERNAL MEDICINE
Payer: MEDICARE

## 2022-05-31 VITALS
RESPIRATION RATE: 18 BRPM | DIASTOLIC BLOOD PRESSURE: 74 MMHG | BODY MASS INDEX: 25 KG/M2 | WEIGHT: 145.38 LBS | OXYGEN SATURATION: 97 % | SYSTOLIC BLOOD PRESSURE: 145 MMHG | HEART RATE: 74 BPM | TEMPERATURE: 97 F

## 2022-05-31 DIAGNOSIS — D63.8 ANEMIA OF CHRONIC DISEASE: Primary | ICD-10-CM

## 2022-05-31 LAB — HGB BLD-MCNC: 9.5 G/DL

## 2022-05-31 PROCEDURE — 96372 THER/PROPH/DIAG INJ SC/IM: CPT

## 2022-05-31 PROCEDURE — 36415 COLL VENOUS BLD VENIPUNCTURE: CPT

## 2022-05-31 PROCEDURE — 85018 HEMOGLOBIN: CPT

## 2022-05-31 NOTE — PROGRESS NOTES
Education Record    Learner:  Patient    Disease / Diagnosis: Pt here for possible retacrit    Barriers / Limitations:  None    Method:  Brief focused, printed material and  reinforcement    General Topics:  Plan of care reviewed    Outcome:  Shows understanding    Pt Hgb 9.5 Pt received retacrit today per order. Pt has next appt scheduled. Pt left in stable condition.

## 2022-06-06 ENCOUNTER — OFFICE VISIT (OUTPATIENT)
Dept: HEMATOLOGY/ONCOLOGY | Facility: HOSPITAL | Age: 82
End: 2022-06-06
Attending: INTERNAL MEDICINE
Payer: MEDICARE

## 2022-06-06 VITALS
SYSTOLIC BLOOD PRESSURE: 132 MMHG | TEMPERATURE: 98 F | DIASTOLIC BLOOD PRESSURE: 71 MMHG | OXYGEN SATURATION: 96 % | RESPIRATION RATE: 18 BRPM | HEART RATE: 69 BPM

## 2022-06-06 DIAGNOSIS — D63.8 ANEMIA OF CHRONIC DISEASE: Primary | ICD-10-CM

## 2022-06-06 LAB — HGB BLD-MCNC: 9.8 G/DL

## 2022-06-06 PROCEDURE — 85018 HEMOGLOBIN: CPT

## 2022-06-06 PROCEDURE — 96372 THER/PROPH/DIAG INJ SC/IM: CPT

## 2022-06-06 NOTE — PROGRESS NOTES
Education Record    Learner:  Patient    Disease / Diagnosis: Retacrit injection    Barriers / Limitations:  None   Comments:    Method:  Brief focused and Reinforcement   Comments:    General Topics:  Plan of care reviewed   Comments:    Outcome:  Shows understanding   Comments: Patient tolerated injection and discharged in stable condition.

## 2022-06-08 ENCOUNTER — IMMUNIZATION (OUTPATIENT)
Dept: LAB | Age: 82
End: 2022-06-08
Attending: EMERGENCY MEDICINE
Payer: MEDICARE

## 2022-06-08 DIAGNOSIS — Z23 NEED FOR VACCINATION: Primary | ICD-10-CM

## 2022-06-08 PROCEDURE — 0064A SARSCOV2 VAC 50MCG/0.25ML IM: CPT

## 2022-06-13 ENCOUNTER — OFFICE VISIT (OUTPATIENT)
Dept: HEMATOLOGY/ONCOLOGY | Facility: HOSPITAL | Age: 82
End: 2022-06-13
Attending: INTERNAL MEDICINE
Payer: MEDICARE

## 2022-06-13 VITALS
WEIGHT: 145.81 LBS | OXYGEN SATURATION: 94 % | DIASTOLIC BLOOD PRESSURE: 72 MMHG | TEMPERATURE: 98 F | BODY MASS INDEX: 25 KG/M2 | SYSTOLIC BLOOD PRESSURE: 128 MMHG | RESPIRATION RATE: 16 BRPM | HEART RATE: 71 BPM

## 2022-06-13 DIAGNOSIS — M35.3 PMR (POLYMYALGIA RHEUMATICA) (HCC): ICD-10-CM

## 2022-06-13 DIAGNOSIS — D63.8 ANEMIA OF CHRONIC DISEASE: Primary | ICD-10-CM

## 2022-06-13 LAB
ALBUMIN SERPL-MCNC: 3.2 G/DL (ref 3.4–5)
ALBUMIN/GLOB SERPL: 0.9 {RATIO} (ref 1–2)
ALP LIVER SERPL-CCNC: 57 U/L
ALT SERPL-CCNC: 22 U/L
ANION GAP SERPL CALC-SCNC: 8 MMOL/L (ref 0–18)
AST SERPL-CCNC: 21 U/L (ref 15–37)
BASOPHILS # BLD AUTO: 0.03 X10(3) UL (ref 0–0.2)
BASOPHILS NFR BLD AUTO: 0.6 %
BILIRUB SERPL-MCNC: 1.3 MG/DL (ref 0.1–2)
BUN BLD-MCNC: 20 MG/DL (ref 7–18)
CALCIUM BLD-MCNC: 9 MG/DL (ref 8.5–10.1)
CHLORIDE SERPL-SCNC: 104 MMOL/L (ref 98–112)
CO2 SERPL-SCNC: 25 MMOL/L (ref 21–32)
CREAT BLD-MCNC: 0.75 MG/DL
DEPRECATED HBV CORE AB SER IA-ACNC: 389.6 NG/ML
EOSINOPHIL # BLD AUTO: 0.54 X10(3) UL (ref 0–0.7)
EOSINOPHIL NFR BLD AUTO: 10.3 %
ERYTHROCYTE [DISTWIDTH] IN BLOOD BY AUTOMATED COUNT: 22.8 %
FASTING STATUS PATIENT QL REPORTED: NO
GLOBULIN PLAS-MCNC: 3.6 G/DL (ref 2.8–4.4)
GLUCOSE BLD-MCNC: 140 MG/DL (ref 70–99)
HCT VFR BLD AUTO: 31.1 %
HGB BLD-MCNC: 9.7 G/DL
IMM GRANULOCYTES # BLD AUTO: 0.01 X10(3) UL (ref 0–1)
IMM GRANULOCYTES NFR BLD: 0.2 %
IRON SATN MFR SERPL: 30 %
IRON SERPL-MCNC: 96 UG/DL
LYMPHOCYTES # BLD AUTO: 1.87 X10(3) UL (ref 1–4)
LYMPHOCYTES NFR BLD AUTO: 35.6 %
MCH RBC QN AUTO: 31.3 PG (ref 26–34)
MCHC RBC AUTO-ENTMCNC: 31.2 G/DL (ref 31–37)
MCV RBC AUTO: 100.3 FL
MONOCYTES # BLD AUTO: 0.38 X10(3) UL (ref 0.1–1)
MONOCYTES NFR BLD AUTO: 7.2 %
NEUTROPHILS # BLD AUTO: 2.42 X10 (3) UL (ref 1.5–7.7)
NEUTROPHILS # BLD AUTO: 2.42 X10(3) UL (ref 1.5–7.7)
NEUTROPHILS NFR BLD AUTO: 46.1 %
OSMOLALITY SERPL CALC.SUM OF ELEC: 289 MOSM/KG (ref 275–295)
PLATELET # BLD AUTO: 278 10(3)UL (ref 150–450)
PLATELET MORPHOLOGY: NORMAL
POTASSIUM SERPL-SCNC: 3.9 MMOL/L (ref 3.5–5.1)
PROT SERPL-MCNC: 6.8 G/DL (ref 6.4–8.2)
RBC # BLD AUTO: 3.1 X10(6)UL
SODIUM SERPL-SCNC: 137 MMOL/L (ref 136–145)
TIBC SERPL-MCNC: 320 UG/DL (ref 240–450)
TRANSFERRIN SERPL-MCNC: 215 MG/DL (ref 200–360)
WBC # BLD AUTO: 5.3 X10(3) UL (ref 4–11)

## 2022-06-13 PROCEDURE — 99214 OFFICE O/P EST MOD 30 MIN: CPT | Performed by: INTERNAL MEDICINE

## 2022-06-13 PROCEDURE — 96372 THER/PROPH/DIAG INJ SC/IM: CPT

## 2022-06-13 NOTE — PROGRESS NOTES
Outpatient Oncology Care Plan  Problem list:  anemia  knowledge deficit    Problems related to:    disease/disease progression    Interventions:  provided general teaching    Expected outcomes:  understands plan of care    Progress towards outcome:  making progress    Education Record    Learner:  Patient  Barriers / Limitations:  None  Method:  Brief focused  Outcome:  Shows understanding  Comments:  Pt here for follow up and possible Rectacort injection. She complains of fatigue and shortness of breath with ambulation and stairs. She feels the injections are helping.

## 2022-06-13 NOTE — PROGRESS NOTES
Education Record    Learner:  Patient    Disease / Diagnosis: Anemia of chronic disease    Barriers / Limitations:  None   Comments:    Method:  Brief focused   Comments:    General Topics:  Plan of care reviewed   Comments:    Outcome:  Shows understanding   Comments:

## 2022-06-27 ENCOUNTER — OFFICE VISIT (OUTPATIENT)
Dept: HEMATOLOGY/ONCOLOGY | Facility: HOSPITAL | Age: 82
End: 2022-06-27
Attending: INTERNAL MEDICINE
Payer: MEDICARE

## 2022-06-27 VITALS
HEART RATE: 68 BPM | DIASTOLIC BLOOD PRESSURE: 75 MMHG | HEIGHT: 64.02 IN | OXYGEN SATURATION: 95 % | SYSTOLIC BLOOD PRESSURE: 132 MMHG | RESPIRATION RATE: 16 BRPM | TEMPERATURE: 97 F | WEIGHT: 144.38 LBS | BODY MASS INDEX: 24.65 KG/M2

## 2022-06-27 DIAGNOSIS — D63.8 ANEMIA OF CHRONIC DISEASE: Primary | ICD-10-CM

## 2022-06-27 LAB — HGB BLD-MCNC: 9.7 G/DL

## 2022-06-27 PROCEDURE — 85018 HEMOGLOBIN: CPT

## 2022-06-27 PROCEDURE — 36415 COLL VENOUS BLD VENIPUNCTURE: CPT

## 2022-06-27 PROCEDURE — 96372 THER/PROPH/DIAG INJ SC/IM: CPT

## 2022-06-27 NOTE — PROGRESS NOTES
Education Record    Learner:  Patient    Disease / Diagnosis: Pt here for possible retacrit injection. Barriers / Limitations:  None    Method:  Brief focused, printed material and  reinforcement    General Topics:  Plan of care reviewed    Outcome:  Shows understanding    Pt tolerated injection. Pt left in stable condition.

## 2022-07-11 ENCOUNTER — OFFICE VISIT (OUTPATIENT)
Dept: HEMATOLOGY/ONCOLOGY | Facility: HOSPITAL | Age: 82
End: 2022-07-11
Attending: INTERNAL MEDICINE
Payer: MEDICARE

## 2022-07-11 VITALS
RESPIRATION RATE: 18 BRPM | TEMPERATURE: 98 F | SYSTOLIC BLOOD PRESSURE: 130 MMHG | HEART RATE: 70 BPM | HEIGHT: 64.02 IN | BODY MASS INDEX: 24.62 KG/M2 | OXYGEN SATURATION: 94 % | WEIGHT: 144.19 LBS | DIASTOLIC BLOOD PRESSURE: 69 MMHG

## 2022-07-11 DIAGNOSIS — D63.8 ANEMIA OF CHRONIC DISEASE: Primary | ICD-10-CM

## 2022-07-11 LAB — HGB BLD-MCNC: 9.6 G/DL

## 2022-07-11 PROCEDURE — 36415 COLL VENOUS BLD VENIPUNCTURE: CPT

## 2022-07-11 PROCEDURE — 96372 THER/PROPH/DIAG INJ SC/IM: CPT

## 2022-07-11 PROCEDURE — 85018 HEMOGLOBIN: CPT

## 2022-07-11 NOTE — PROGRESS NOTES
Education Record    Learner:  Patient    Disease / Diagnosis: anemia    Barriers / Limitations:  None    Method:  Brief focused, printed material and  reinforcement    General Topics:  Plan of care reviewed    Outcome:  Shows understanding    Hgb 9.6. Coming q2wks. Retacrit given. Mammogram is normal, repeat in 1 year

## 2022-07-25 ENCOUNTER — OFFICE VISIT (OUTPATIENT)
Dept: HEMATOLOGY/ONCOLOGY | Facility: HOSPITAL | Age: 82
End: 2022-07-25
Attending: INTERNAL MEDICINE
Payer: MEDICARE

## 2022-07-25 VITALS
HEART RATE: 64 BPM | BODY MASS INDEX: 24.69 KG/M2 | DIASTOLIC BLOOD PRESSURE: 67 MMHG | RESPIRATION RATE: 18 BRPM | WEIGHT: 144.63 LBS | OXYGEN SATURATION: 96 % | TEMPERATURE: 98 F | HEIGHT: 64.02 IN | SYSTOLIC BLOOD PRESSURE: 148 MMHG

## 2022-07-25 DIAGNOSIS — D63.8 ANEMIA OF CHRONIC DISEASE: Primary | ICD-10-CM

## 2022-07-25 LAB — HGB BLD-MCNC: 9.2 G/DL

## 2022-07-25 PROCEDURE — 36415 COLL VENOUS BLD VENIPUNCTURE: CPT

## 2022-07-25 PROCEDURE — 85018 HEMOGLOBIN: CPT

## 2022-07-25 PROCEDURE — 96372 THER/PROPH/DIAG INJ SC/IM: CPT

## 2022-07-25 NOTE — PROGRESS NOTES
Education Record    Learner:  Patient    Disease / Diagnosis: Pt here for retacrit injection    Barriers / Limitations:  None    Method:  Brief focused, printed material and  reinforcement    General Topics:  Plan of care reviewed    Outcome:  Shows understanding    Pt Hgb 9.2 per order, pt received retacrit injection.

## 2022-08-08 ENCOUNTER — OFFICE VISIT (OUTPATIENT)
Dept: HEMATOLOGY/ONCOLOGY | Facility: HOSPITAL | Age: 82
End: 2022-08-08
Attending: INTERNAL MEDICINE
Payer: MEDICARE

## 2022-08-08 VITALS
HEART RATE: 70 BPM | HEIGHT: 64.02 IN | TEMPERATURE: 98 F | OXYGEN SATURATION: 96 % | RESPIRATION RATE: 18 BRPM | BODY MASS INDEX: 24.75 KG/M2 | WEIGHT: 145 LBS

## 2022-08-08 DIAGNOSIS — D63.8 ANEMIA OF CHRONIC DISEASE: Primary | ICD-10-CM

## 2022-08-08 LAB — HGB BLD-MCNC: 9.6 G/DL

## 2022-08-08 PROCEDURE — 96372 THER/PROPH/DIAG INJ SC/IM: CPT

## 2022-08-08 PROCEDURE — 85018 HEMOGLOBIN: CPT

## 2022-08-08 PROCEDURE — 36415 COLL VENOUS BLD VENIPUNCTURE: CPT

## 2022-08-22 ENCOUNTER — OFFICE VISIT (OUTPATIENT)
Dept: HEMATOLOGY/ONCOLOGY | Facility: HOSPITAL | Age: 82
End: 2022-08-22
Attending: INTERNAL MEDICINE
Payer: MEDICARE

## 2022-08-22 VITALS
HEIGHT: 64.02 IN | BODY MASS INDEX: 24.62 KG/M2 | OXYGEN SATURATION: 95 % | HEART RATE: 66 BPM | TEMPERATURE: 98 F | DIASTOLIC BLOOD PRESSURE: 73 MMHG | SYSTOLIC BLOOD PRESSURE: 149 MMHG | WEIGHT: 144.19 LBS

## 2022-08-22 DIAGNOSIS — D63.8 ANEMIA OF CHRONIC DISEASE: Primary | ICD-10-CM

## 2022-08-22 LAB — HGB BLD-MCNC: 9.8 G/DL

## 2022-08-22 PROCEDURE — 36415 COLL VENOUS BLD VENIPUNCTURE: CPT

## 2022-08-22 PROCEDURE — 96372 THER/PROPH/DIAG INJ SC/IM: CPT

## 2022-08-22 PROCEDURE — 85018 HEMOGLOBIN: CPT

## 2022-08-22 NOTE — PROGRESS NOTES
Education Record    Learner:  Patient    Disease / Diagnosis: here for retacrit injection    Barriers / Limitations:  None   Comments:    Method:  Brief focused   Comments:    General Topics:  Plan of care reviewed   Comments:    Outcome:  Shows understanding   Comments: Hgb 9.8, retacrit given as ordered. Pt tolerated injection without incident. Pt has follow up appt already scheduled on 9/6. Discharged pt home in stable condition.

## 2022-08-29 ENCOUNTER — APPOINTMENT (OUTPATIENT)
Dept: HEMATOLOGY/ONCOLOGY | Facility: HOSPITAL | Age: 82
End: 2022-08-29
Attending: INTERNAL MEDICINE
Payer: MEDICARE

## 2022-09-06 ENCOUNTER — OFFICE VISIT (OUTPATIENT)
Dept: HEMATOLOGY/ONCOLOGY | Facility: HOSPITAL | Age: 82
End: 2022-09-06
Attending: INTERNAL MEDICINE
Payer: MEDICARE

## 2022-09-06 VITALS
HEIGHT: 64.02 IN | TEMPERATURE: 98 F | SYSTOLIC BLOOD PRESSURE: 148 MMHG | OXYGEN SATURATION: 95 % | WEIGHT: 144 LBS | DIASTOLIC BLOOD PRESSURE: 73 MMHG | BODY MASS INDEX: 24.59 KG/M2 | HEART RATE: 68 BPM | RESPIRATION RATE: 16 BRPM

## 2022-09-06 DIAGNOSIS — D63.8 ANEMIA OF CHRONIC DISEASE: Primary | ICD-10-CM

## 2022-09-06 LAB
ALBUMIN SERPL-MCNC: 3.4 G/DL (ref 3.4–5)
ALBUMIN/GLOB SERPL: 0.9 {RATIO} (ref 1–2)
ALP LIVER SERPL-CCNC: 62 U/L
ALT SERPL-CCNC: 28 U/L
ANION GAP SERPL CALC-SCNC: 4 MMOL/L (ref 0–18)
AST SERPL-CCNC: 23 U/L (ref 15–37)
BASOPHILS # BLD AUTO: 0.04 X10(3) UL (ref 0–0.2)
BASOPHILS NFR BLD AUTO: 0.5 %
BILIRUB SERPL-MCNC: 1.3 MG/DL (ref 0.1–2)
BUN BLD-MCNC: 26 MG/DL (ref 7–18)
CALCIUM BLD-MCNC: 9.7 MG/DL (ref 8.5–10.1)
CHLORIDE SERPL-SCNC: 105 MMOL/L (ref 98–112)
CO2 SERPL-SCNC: 26 MMOL/L (ref 21–32)
CREAT BLD-MCNC: 0.65 MG/DL
DEPRECATED HBV CORE AB SER IA-ACNC: 755.3 NG/ML
EOSINOPHIL # BLD AUTO: 0.31 X10(3) UL (ref 0–0.7)
EOSINOPHIL NFR BLD AUTO: 3.5 %
ERYTHROCYTE [DISTWIDTH] IN BLOOD BY AUTOMATED COUNT: 22 %
GFR SERPLBLD BASED ON 1.73 SQ M-ARVRAT: 88 ML/MIN/1.73M2 (ref 60–?)
GLOBULIN PLAS-MCNC: 4 G/DL (ref 2.8–4.4)
GLUCOSE BLD-MCNC: 89 MG/DL (ref 70–99)
HCT VFR BLD AUTO: 30.7 %
HGB BLD-MCNC: 9.7 G/DL
HGB BLD-MCNC: 9.7 G/DL
IMM GRANULOCYTES # BLD AUTO: 0.03 X10(3) UL (ref 0–1)
IMM GRANULOCYTES NFR BLD: 0.3 %
IRON SATN MFR SERPL: 18 %
IRON SERPL-MCNC: 63 UG/DL
LYMPHOCYTES # BLD AUTO: 2.96 X10(3) UL (ref 1–4)
LYMPHOCYTES NFR BLD AUTO: 33.3 %
MCH RBC QN AUTO: 31.8 PG (ref 26–34)
MCHC RBC AUTO-ENTMCNC: 31.6 G/DL (ref 31–37)
MCV RBC AUTO: 100.7 FL
MONOCYTES # BLD AUTO: 0.85 X10(3) UL (ref 0.1–1)
MONOCYTES NFR BLD AUTO: 9.6 %
NEUTROPHILS # BLD AUTO: 4.69 X10 (3) UL (ref 1.5–7.7)
NEUTROPHILS # BLD AUTO: 4.69 X10(3) UL (ref 1.5–7.7)
NEUTROPHILS NFR BLD AUTO: 52.8 %
OSMOLALITY SERPL CALC.SUM OF ELEC: 284 MOSM/KG (ref 275–295)
PLATELET # BLD AUTO: 250 10(3)UL (ref 150–450)
POTASSIUM SERPL-SCNC: 4.3 MMOL/L (ref 3.5–5.1)
PROT SERPL-MCNC: 7.4 G/DL (ref 6.4–8.2)
RBC # BLD AUTO: 3.05 X10(6)UL
SODIUM SERPL-SCNC: 135 MMOL/L (ref 136–145)
TIBC SERPL-MCNC: 341 UG/DL (ref 240–450)
TRANSFERRIN SERPL-MCNC: 229 MG/DL (ref 200–360)
WBC # BLD AUTO: 8.9 X10(3) UL (ref 4–11)

## 2022-09-06 PROCEDURE — 96372 THER/PROPH/DIAG INJ SC/IM: CPT

## 2022-09-06 PROCEDURE — 99213 OFFICE O/P EST LOW 20 MIN: CPT | Performed by: INTERNAL MEDICINE

## 2022-09-06 NOTE — PROGRESS NOTES
Education Record    Learner:  Patient    Disease / Diagnosis: here for retacrit    Barriers / Limitations:  None   Comments:    Method:  Discussion   Comments:    General Topics:  Plan of care reviewed   Comments:    Outcome:  Shows understanding   Comments:    HGB 9.7. Retacrit given per MD order without incident. Tolerated well. Reviewed next appointment. Pt states her  will get appointments off of mychart. Discharged home in stable condition, no new complaints.

## 2022-09-06 NOTE — PROGRESS NOTES
Pt here for follow up and injection. She complains of some fatigue. She states she feels the retacrit injection is helping.     Outpatient Oncology Care Plan  Problem list:  knowledge deficit    Problems related to:    disease/disease progression    Interventions:  provided general teaching    Expected outcomes:  understands plan of care    Progress towards outcome:  making progress    Education Record    Learner:  Patient  Barriers / Limitations:  None  Method:  Brief focused  Outcome:  Shows understanding     Comments:

## 2022-09-19 ENCOUNTER — OFFICE VISIT (OUTPATIENT)
Dept: HEMATOLOGY/ONCOLOGY | Facility: HOSPITAL | Age: 82
End: 2022-09-19
Attending: INTERNAL MEDICINE
Payer: MEDICARE

## 2022-09-19 VITALS
TEMPERATURE: 98 F | HEIGHT: 64.02 IN | OXYGEN SATURATION: 96 % | HEART RATE: 67 BPM | BODY MASS INDEX: 24.75 KG/M2 | SYSTOLIC BLOOD PRESSURE: 146 MMHG | RESPIRATION RATE: 16 BRPM | WEIGHT: 145 LBS | DIASTOLIC BLOOD PRESSURE: 64 MMHG

## 2022-09-19 DIAGNOSIS — D63.8 ANEMIA OF CHRONIC DISEASE: Primary | ICD-10-CM

## 2022-09-19 LAB — HGB BLD-MCNC: 9.6 G/DL

## 2022-09-19 PROCEDURE — 85018 HEMOGLOBIN: CPT

## 2022-09-19 PROCEDURE — 36415 COLL VENOUS BLD VENIPUNCTURE: CPT

## 2022-09-19 PROCEDURE — 96372 THER/PROPH/DIAG INJ SC/IM: CPT

## 2022-09-19 NOTE — PROGRESS NOTES
Pt given retacrit as ordered. Aware of hgb, Given AVS with appts and understanding POC. Pt departed stable.

## 2022-10-03 ENCOUNTER — OFFICE VISIT (OUTPATIENT)
Dept: HEMATOLOGY/ONCOLOGY | Facility: HOSPITAL | Age: 82
End: 2022-10-03
Attending: INTERNAL MEDICINE
Payer: MEDICARE

## 2022-10-03 VITALS
HEIGHT: 64.02 IN | RESPIRATION RATE: 18 BRPM | SYSTOLIC BLOOD PRESSURE: 137 MMHG | DIASTOLIC BLOOD PRESSURE: 51 MMHG | WEIGHT: 146.63 LBS | BODY MASS INDEX: 25.03 KG/M2 | OXYGEN SATURATION: 94 % | HEART RATE: 65 BPM | TEMPERATURE: 97 F

## 2022-10-03 DIAGNOSIS — D63.8 ANEMIA OF CHRONIC DISEASE: Primary | ICD-10-CM

## 2022-10-03 LAB — HEMOGLOBIN POC: 10.2 G/DL

## 2022-10-03 PROCEDURE — 99211 OFF/OP EST MAY X REQ PHY/QHP: CPT

## 2022-10-03 PROCEDURE — 85018 HEMOGLOBIN: CPT

## 2022-10-03 PROCEDURE — 36416 COLLJ CAPILLARY BLOOD SPEC: CPT

## 2022-10-03 NOTE — PROGRESS NOTES
Education Record    Learner:  Patient    Disease / Diagnosis: here for retacrit    Barriers / Limitations:  None   Comments:    Method:  Discussion   Comments:    General Topics:  Plan of care reviewed   Comments:    Outcome:  Shows understanding   Comments:    HGB 10.2. No retacrit needed. Reviewed next appointments. Discharged home in stable condition, no new complaints.

## 2022-10-17 ENCOUNTER — OFFICE VISIT (OUTPATIENT)
Dept: HEMATOLOGY/ONCOLOGY | Facility: HOSPITAL | Age: 82
End: 2022-10-17
Attending: INTERNAL MEDICINE
Payer: MEDICARE

## 2022-10-17 VITALS
BODY MASS INDEX: 24.92 KG/M2 | TEMPERATURE: 98 F | HEART RATE: 66 BPM | WEIGHT: 146 LBS | DIASTOLIC BLOOD PRESSURE: 74 MMHG | OXYGEN SATURATION: 96 % | RESPIRATION RATE: 18 BRPM | SYSTOLIC BLOOD PRESSURE: 137 MMHG | HEIGHT: 64.02 IN

## 2022-10-17 DIAGNOSIS — D63.8 ANEMIA OF CHRONIC DISEASE: Primary | ICD-10-CM

## 2022-10-17 LAB — HGB BLD-MCNC: 9.4 G/DL

## 2022-10-17 PROCEDURE — 36415 COLL VENOUS BLD VENIPUNCTURE: CPT

## 2022-10-17 PROCEDURE — 96372 THER/PROPH/DIAG INJ SC/IM: CPT

## 2022-10-17 PROCEDURE — 85018 HEMOGLOBIN: CPT

## 2022-10-17 NOTE — PROGRESS NOTES
Education Record    Learner:  Patient    Disease / Diagnosis: here for retacrit    Barriers / Limitations:  None   Comments:    Method:  Discussion   Comments:    General Topics:  Plan of care reviewed   Comments:    Outcome:  Shows understanding   Comments:    HGB 9.4. Retacrit given per MD order without incident. Tolerated well. Printed AVS given and reviewed. Discharged home in stable condition, no new complaints.

## 2022-10-24 ENCOUNTER — TELEPHONE (OUTPATIENT)
Dept: INTERNAL MEDICINE CLINIC | Facility: CLINIC | Age: 82
End: 2022-10-24

## 2022-10-24 DIAGNOSIS — Z00.00 ROUTINE GENERAL MEDICAL EXAMINATION AT A HEALTH CARE FACILITY: ICD-10-CM

## 2022-10-24 DIAGNOSIS — E78.2 MIXED HYPERLIPIDEMIA: Primary | ICD-10-CM

## 2022-10-24 DIAGNOSIS — I10 PRIMARY HYPERTENSION: ICD-10-CM

## 2022-10-24 NOTE — TELEPHONE ENCOUNTER
Future Appointments   Date Time Provider Johanne Mckeon   2/10/2023  7:40 AM Juan Gil MD [facility]     Orders to edward- Pt informed that labs need to be completed no sooner than 2 weeks prior to the appt.  Pt aware to fast-no call back required

## 2022-10-31 ENCOUNTER — OFFICE VISIT (OUTPATIENT)
Dept: HEMATOLOGY/ONCOLOGY | Facility: HOSPITAL | Age: 82
End: 2022-10-31
Attending: INTERNAL MEDICINE
Payer: MEDICARE

## 2022-10-31 DIAGNOSIS — D63.8 ANEMIA OF CHRONIC DISEASE: Primary | ICD-10-CM

## 2022-10-31 LAB — HGB BLD-MCNC: 9.9 G/DL

## 2022-10-31 PROCEDURE — 96372 THER/PROPH/DIAG INJ SC/IM: CPT

## 2022-10-31 PROCEDURE — 85018 HEMOGLOBIN: CPT

## 2022-10-31 PROCEDURE — 36415 COLL VENOUS BLD VENIPUNCTURE: CPT

## 2022-10-31 NOTE — PROGRESS NOTES
Education Record    Learner:  Patient    Disease / Diagnosis: Retacrit    Barriers / Limitations:  None   Comments:    Method:  Brief focused and Reinforcement   Comments:    General Topics:  Diet, Medication, Side effects and symptom management and Plan of care reviewed   Comments:    Outcome:  Shows understanding   Comments: Patient tolerated injection and discharged in stable condition.

## 2022-11-14 ENCOUNTER — OFFICE VISIT (OUTPATIENT)
Dept: HEMATOLOGY/ONCOLOGY | Facility: HOSPITAL | Age: 82
End: 2022-11-14
Attending: INTERNAL MEDICINE
Payer: MEDICARE

## 2022-11-14 VITALS
HEIGHT: 64.02 IN | RESPIRATION RATE: 16 BRPM | TEMPERATURE: 98 F | SYSTOLIC BLOOD PRESSURE: 146 MMHG | BODY MASS INDEX: 24.41 KG/M2 | DIASTOLIC BLOOD PRESSURE: 72 MMHG | HEART RATE: 68 BPM | WEIGHT: 143 LBS | OXYGEN SATURATION: 97 %

## 2022-11-14 DIAGNOSIS — D63.8 ANEMIA OF CHRONIC DISEASE: Primary | ICD-10-CM

## 2022-11-14 LAB — HGB BLD-MCNC: 9.8 G/DL

## 2022-11-14 PROCEDURE — 96372 THER/PROPH/DIAG INJ SC/IM: CPT

## 2022-11-14 PROCEDURE — 85018 HEMOGLOBIN: CPT

## 2022-11-14 PROCEDURE — 36415 COLL VENOUS BLD VENIPUNCTURE: CPT

## 2022-11-28 ENCOUNTER — OFFICE VISIT (OUTPATIENT)
Dept: HEMATOLOGY/ONCOLOGY | Facility: HOSPITAL | Age: 82
End: 2022-11-28
Attending: INTERNAL MEDICINE
Payer: MEDICARE

## 2022-11-28 VITALS
WEIGHT: 145 LBS | BODY MASS INDEX: 24.75 KG/M2 | HEIGHT: 64.02 IN | SYSTOLIC BLOOD PRESSURE: 154 MMHG | RESPIRATION RATE: 16 BRPM | DIASTOLIC BLOOD PRESSURE: 67 MMHG | OXYGEN SATURATION: 96 % | TEMPERATURE: 97 F | HEART RATE: 67 BPM

## 2022-11-28 DIAGNOSIS — D63.8 ANEMIA OF CHRONIC DISEASE: Primary | ICD-10-CM

## 2022-11-28 LAB — HGB BLD-MCNC: 9.7 G/DL

## 2022-11-28 PROCEDURE — 99213 OFFICE O/P EST LOW 20 MIN: CPT | Performed by: INTERNAL MEDICINE

## 2022-11-28 PROCEDURE — 96372 THER/PROPH/DIAG INJ SC/IM: CPT

## 2022-11-28 NOTE — PROGRESS NOTES
Pt here for follow up and injection. She states her energy level is improving.     Outpatient Oncology Care Plan  Problem list:  knowledge deficit    Problems related to:    disease/disease progression    Interventions:  provided general teaching    Expected outcomes:  understands plan of care    Progress towards outcome:  making progress    Education Record    Learner:  Patient  Barriers / Limitations:  None  Method:  Brief focused  Outcome:  Shows understanding  Comments:

## 2022-12-12 ENCOUNTER — OFFICE VISIT (OUTPATIENT)
Dept: HEMATOLOGY/ONCOLOGY | Facility: HOSPITAL | Age: 82
End: 2022-12-12
Attending: INTERNAL MEDICINE
Payer: MEDICARE

## 2022-12-12 VITALS
BODY MASS INDEX: 24.89 KG/M2 | WEIGHT: 145.81 LBS | HEIGHT: 64.02 IN | OXYGEN SATURATION: 98 % | SYSTOLIC BLOOD PRESSURE: 162 MMHG | RESPIRATION RATE: 18 BRPM | HEART RATE: 71 BPM | DIASTOLIC BLOOD PRESSURE: 71 MMHG | TEMPERATURE: 98 F

## 2022-12-12 DIAGNOSIS — D63.8 ANEMIA OF CHRONIC DISEASE: Primary | ICD-10-CM

## 2022-12-12 LAB — HGB BLD-MCNC: 9.4 G/DL

## 2022-12-12 PROCEDURE — 36415 COLL VENOUS BLD VENIPUNCTURE: CPT

## 2022-12-12 PROCEDURE — 85018 HEMOGLOBIN: CPT

## 2022-12-12 PROCEDURE — 96372 THER/PROPH/DIAG INJ SC/IM: CPT

## 2022-12-12 RX ORDER — ESCITALOPRAM OXALATE 10 MG/1
TABLET ORAL
Qty: 90 TABLET | Refills: 3 | Status: SHIPPED | OUTPATIENT
Start: 2022-12-12

## 2022-12-12 NOTE — PROGRESS NOTES
Education Record    Learner:  Patient    Disease / Diagnosis: Retacrit    Barriers / Limitations:  None   Comments:    Method:  Discussion   Comments:    General Topics:  Plan of care reviewed   Comments:    Outcome:  Shows understanding   Comments:

## 2022-12-27 ENCOUNTER — OFFICE VISIT (OUTPATIENT)
Dept: HEMATOLOGY/ONCOLOGY | Facility: HOSPITAL | Age: 82
End: 2022-12-27
Attending: INTERNAL MEDICINE
Payer: MEDICARE

## 2022-12-27 DIAGNOSIS — D63.8 ANEMIA OF CHRONIC DISEASE: Primary | ICD-10-CM

## 2022-12-27 LAB — HGB BLD-MCNC: 10.4 G/DL

## 2022-12-27 PROCEDURE — 36415 COLL VENOUS BLD VENIPUNCTURE: CPT

## 2022-12-27 PROCEDURE — 85018 HEMOGLOBIN: CPT

## 2022-12-27 NOTE — PROGRESS NOTES
Labs reviewed with [facility] [facility] and orders received to hold injection today as hgb 10.4. Patient states understanding and will return in 2 weeks for repeat labs and poss injection.     Education Record    Learner:  Patient    Disease / Diagnosis: Anemia    Barriers / Limitations:  None   Comments:    Method:  Brief focused   Comments:    General Topics:  Plan of care reviewed   Comments:    Outcome:  Shows understanding   Comments:

## 2023-01-09 ENCOUNTER — OFFICE VISIT (OUTPATIENT)
Dept: HEMATOLOGY/ONCOLOGY | Facility: HOSPITAL | Age: 83
End: 2023-01-09
Attending: INTERNAL MEDICINE
Payer: MEDICARE

## 2023-01-09 VITALS
TEMPERATURE: 97 F | OXYGEN SATURATION: 97 % | HEART RATE: 78 BPM | DIASTOLIC BLOOD PRESSURE: 80 MMHG | SYSTOLIC BLOOD PRESSURE: 167 MMHG | RESPIRATION RATE: 18 BRPM

## 2023-01-09 DIAGNOSIS — D63.8 ANEMIA OF CHRONIC DISEASE: Primary | ICD-10-CM

## 2023-01-09 LAB — HGB BLD-MCNC: 9.8 G/DL

## 2023-01-09 PROCEDURE — 96372 THER/PROPH/DIAG INJ SC/IM: CPT

## 2023-01-09 PROCEDURE — 85018 HEMOGLOBIN: CPT

## 2023-01-09 PROCEDURE — 36415 COLL VENOUS BLD VENIPUNCTURE: CPT

## 2023-01-23 ENCOUNTER — OFFICE VISIT (OUTPATIENT)
Dept: HEMATOLOGY/ONCOLOGY | Facility: HOSPITAL | Age: 83
End: 2023-01-23
Attending: INTERNAL MEDICINE
Payer: MEDICARE

## 2023-01-23 VITALS
SYSTOLIC BLOOD PRESSURE: 157 MMHG | HEIGHT: 64.02 IN | RESPIRATION RATE: 16 BRPM | WEIGHT: 148 LBS | OXYGEN SATURATION: 97 % | HEART RATE: 76 BPM | DIASTOLIC BLOOD PRESSURE: 69 MMHG | BODY MASS INDEX: 25.27 KG/M2 | TEMPERATURE: 98 F

## 2023-01-23 DIAGNOSIS — D63.8 ANEMIA OF CHRONIC DISEASE: Primary | ICD-10-CM

## 2023-01-23 LAB — HGB BLD-MCNC: 9.8 G/DL

## 2023-01-23 PROCEDURE — 36415 COLL VENOUS BLD VENIPUNCTURE: CPT

## 2023-01-23 PROCEDURE — 85018 HEMOGLOBIN: CPT

## 2023-01-23 PROCEDURE — 96372 THER/PROPH/DIAG INJ SC/IM: CPT

## 2023-01-23 NOTE — PROGRESS NOTES
Patient here for lab and possible Retacrit injection. Hgb 9.8 - received Retacrit 30,000 units as ordered. Next appointment in 2 weeks already made. Patient discharged in good condition.

## 2023-01-31 ENCOUNTER — LAB ENCOUNTER (OUTPATIENT)
Dept: LAB | Age: 83
End: 2023-01-31
Attending: INTERNAL MEDICINE
Payer: MEDICARE

## 2023-01-31 DIAGNOSIS — E78.2 MIXED HYPERLIPIDEMIA: ICD-10-CM

## 2023-01-31 DIAGNOSIS — Z00.00 ROUTINE GENERAL MEDICAL EXAMINATION AT A HEALTH CARE FACILITY: ICD-10-CM

## 2023-01-31 DIAGNOSIS — I10 PRIMARY HYPERTENSION: ICD-10-CM

## 2023-01-31 LAB
ALBUMIN SERPL-MCNC: 3.5 G/DL (ref 3.4–5)
ALBUMIN/GLOB SERPL: 1 {RATIO} (ref 1–2)
ALP LIVER SERPL-CCNC: 56 U/L
ALT SERPL-CCNC: 23 U/L
ANION GAP SERPL CALC-SCNC: 2 MMOL/L (ref 0–18)
AST SERPL-CCNC: 26 U/L (ref 15–37)
BASOPHILS # BLD AUTO: 0.05 X10(3) UL (ref 0–0.2)
BASOPHILS NFR BLD AUTO: 0.6 %
BILIRUB SERPL-MCNC: 1.4 MG/DL (ref 0.1–2)
BUN BLD-MCNC: 18 MG/DL (ref 7–18)
CALCIUM BLD-MCNC: 9.1 MG/DL (ref 8.5–10.1)
CHLORIDE SERPL-SCNC: 106 MMOL/L (ref 98–112)
CHOLEST SERPL-MCNC: 129 MG/DL (ref ?–200)
CO2 SERPL-SCNC: 29 MMOL/L (ref 21–32)
CREAT BLD-MCNC: 0.73 MG/DL
EOSINOPHIL # BLD AUTO: 0.56 X10(3) UL (ref 0–0.7)
EOSINOPHIL NFR BLD AUTO: 7.1 %
ERYTHROCYTE [DISTWIDTH] IN BLOOD BY AUTOMATED COUNT: 23.9 %
FASTING PATIENT LIPID ANSWER: YES
FASTING STATUS PATIENT QL REPORTED: YES
GFR SERPLBLD BASED ON 1.73 SQ M-ARVRAT: 82 ML/MIN/1.73M2 (ref 60–?)
GLOBULIN PLAS-MCNC: 3.5 G/DL (ref 2.8–4.4)
GLUCOSE BLD-MCNC: 90 MG/DL (ref 70–99)
HCT VFR BLD AUTO: 30.7 %
HDLC SERPL-MCNC: 72 MG/DL (ref 40–59)
HGB BLD-MCNC: 10 G/DL
IMM GRANULOCYTES # BLD AUTO: 0.04 X10(3) UL (ref 0–1)
IMM GRANULOCYTES NFR BLD: 0.5 %
LDLC SERPL CALC-MCNC: 46 MG/DL (ref ?–100)
LYMPHOCYTES # BLD AUTO: 2.84 X10(3) UL (ref 1–4)
LYMPHOCYTES NFR BLD AUTO: 35.9 %
MCH RBC QN AUTO: 34.1 PG (ref 26–34)
MCHC RBC AUTO-ENTMCNC: 32.6 G/DL (ref 31–37)
MCV RBC AUTO: 104.8 FL
MONOCYTES # BLD AUTO: 0.69 X10(3) UL (ref 0.1–1)
MONOCYTES NFR BLD AUTO: 8.7 %
NEUTROPHILS # BLD AUTO: 3.73 X10 (3) UL (ref 1.5–7.7)
NEUTROPHILS # BLD AUTO: 3.73 X10(3) UL (ref 1.5–7.7)
NEUTROPHILS NFR BLD AUTO: 47.2 %
NONHDLC SERPL-MCNC: 57 MG/DL (ref ?–130)
OSMOLALITY SERPL CALC.SUM OF ELEC: 285 MOSM/KG (ref 275–295)
PLATELET # BLD AUTO: 360 10(3)UL (ref 150–450)
PLATELET MORPHOLOGY: NORMAL
POTASSIUM SERPL-SCNC: 4.2 MMOL/L (ref 3.5–5.1)
PROT SERPL-MCNC: 7 G/DL (ref 6.4–8.2)
RBC # BLD AUTO: 2.93 X10(6)UL
SODIUM SERPL-SCNC: 137 MMOL/L (ref 136–145)
TRIGL SERPL-MCNC: 46 MG/DL (ref 30–149)
VLDLC SERPL CALC-MCNC: 6 MG/DL (ref 0–30)
WBC # BLD AUTO: 7.9 X10(3) UL (ref 4–11)

## 2023-01-31 PROCEDURE — 80053 COMPREHEN METABOLIC PANEL: CPT

## 2023-01-31 PROCEDURE — 80061 LIPID PANEL: CPT

## 2023-01-31 PROCEDURE — 36415 COLL VENOUS BLD VENIPUNCTURE: CPT

## 2023-01-31 PROCEDURE — 85025 COMPLETE CBC W/AUTO DIFF WBC: CPT

## 2023-02-06 ENCOUNTER — OFFICE VISIT (OUTPATIENT)
Dept: INTERNAL MEDICINE CLINIC | Facility: CLINIC | Age: 83
End: 2023-02-06
Payer: MEDICARE

## 2023-02-06 ENCOUNTER — OFFICE VISIT (OUTPATIENT)
Dept: HEMATOLOGY/ONCOLOGY | Facility: HOSPITAL | Age: 83
End: 2023-02-06
Attending: INTERNAL MEDICINE
Payer: MEDICARE

## 2023-02-06 VITALS
HEART RATE: 64 BPM | DIASTOLIC BLOOD PRESSURE: 58 MMHG | OXYGEN SATURATION: 95 % | HEIGHT: 64 IN | SYSTOLIC BLOOD PRESSURE: 128 MMHG | BODY MASS INDEX: 25.27 KG/M2 | TEMPERATURE: 98 F | WEIGHT: 148 LBS

## 2023-02-06 VITALS
RESPIRATION RATE: 18 BRPM | HEIGHT: 64.02 IN | BODY MASS INDEX: 25.44 KG/M2 | DIASTOLIC BLOOD PRESSURE: 74 MMHG | WEIGHT: 149 LBS | OXYGEN SATURATION: 97 % | HEART RATE: 73 BPM | SYSTOLIC BLOOD PRESSURE: 163 MMHG | TEMPERATURE: 98 F

## 2023-02-06 DIAGNOSIS — M35.00 SJOGREN'S SYNDROME WITHOUT EXTRAGLANDULAR INVOLVEMENT (HCC): ICD-10-CM

## 2023-02-06 DIAGNOSIS — D63.8 ANEMIA OF CHRONIC DISEASE: ICD-10-CM

## 2023-02-06 DIAGNOSIS — I10 PRIMARY HYPERTENSION: ICD-10-CM

## 2023-02-06 DIAGNOSIS — F41.9 ANXIETY: ICD-10-CM

## 2023-02-06 DIAGNOSIS — Z12.31 BREAST CANCER SCREENING BY MAMMOGRAM: ICD-10-CM

## 2023-02-06 DIAGNOSIS — E55.9 VITAMIN D DEFICIENCY: ICD-10-CM

## 2023-02-06 DIAGNOSIS — Z00.00 ROUTINE GENERAL MEDICAL EXAMINATION AT A HEALTH CARE FACILITY: Primary | ICD-10-CM

## 2023-02-06 DIAGNOSIS — D64.9 ANEMIA, UNSPECIFIED TYPE: ICD-10-CM

## 2023-02-06 DIAGNOSIS — R53.83 FATIGUE, UNSPECIFIED TYPE: ICD-10-CM

## 2023-02-06 DIAGNOSIS — D63.8 ANEMIA OF CHRONIC DISEASE: Primary | ICD-10-CM

## 2023-02-06 DIAGNOSIS — M35.00 SICCA SYNDROME (HCC): ICD-10-CM

## 2023-02-06 DIAGNOSIS — M85.80 OSTEOPENIA, UNSPECIFIED LOCATION: ICD-10-CM

## 2023-02-06 DIAGNOSIS — D18.03 HEPATIC HEMANGIOMA: ICD-10-CM

## 2023-02-06 DIAGNOSIS — K64.1 SECOND DEGREE HEMORRHOIDS: ICD-10-CM

## 2023-02-06 DIAGNOSIS — K57.30 COLON, DIVERTICULOSIS: ICD-10-CM

## 2023-02-06 DIAGNOSIS — E78.2 MIXED HYPERLIPIDEMIA: ICD-10-CM

## 2023-02-06 LAB — HGB BLD-MCNC: 10 G/DL

## 2023-02-06 PROCEDURE — 36415 COLL VENOUS BLD VENIPUNCTURE: CPT

## 2023-02-06 PROCEDURE — 99397 PER PM REEVAL EST PAT 65+ YR: CPT | Performed by: INTERNAL MEDICINE

## 2023-02-06 PROCEDURE — 3008F BODY MASS INDEX DOCD: CPT | Performed by: INTERNAL MEDICINE

## 2023-02-06 PROCEDURE — 3078F DIAST BP <80 MM HG: CPT | Performed by: INTERNAL MEDICINE

## 2023-02-06 PROCEDURE — 96160 PT-FOCUSED HLTH RISK ASSMT: CPT | Performed by: INTERNAL MEDICINE

## 2023-02-06 PROCEDURE — G0439 PPPS, SUBSEQ VISIT: HCPCS | Performed by: INTERNAL MEDICINE

## 2023-02-06 PROCEDURE — 1125F AMNT PAIN NOTED PAIN PRSNT: CPT | Performed by: INTERNAL MEDICINE

## 2023-02-06 PROCEDURE — 85018 HEMOGLOBIN: CPT

## 2023-02-06 PROCEDURE — 3074F SYST BP LT 130 MM HG: CPT | Performed by: INTERNAL MEDICINE

## 2023-02-06 RX ORDER — ROSUVASTATIN CALCIUM 10 MG/1
10 TABLET, COATED ORAL DAILY
Qty: 90 TABLET | Refills: 3 | Status: SHIPPED | OUTPATIENT
Start: 2023-02-06

## 2023-02-06 RX ORDER — IRBESARTAN 300 MG/1
300 TABLET ORAL NIGHTLY
Qty: 90 TABLET | Refills: 3 | Status: SHIPPED | OUTPATIENT
Start: 2023-02-06

## 2023-02-06 NOTE — PROGRESS NOTES
Pt seen in treatment area for labs with retacrit injection. Pt does not need injection today based on her labs. Appt is set for next visit with MD. Left in stable condition.

## 2023-02-08 ENCOUNTER — TELEPHONE (OUTPATIENT)
Dept: INTERNAL MEDICINE CLINIC | Facility: CLINIC | Age: 83
End: 2023-02-08

## 2023-02-08 RX ORDER — ESCITALOPRAM OXALATE 10 MG/1
5 TABLET ORAL DAILY
Qty: 90 TABLET | Refills: 3 | COMMUNITY
Start: 2023-02-08

## 2023-02-08 NOTE — TELEPHONE ENCOUNTER
Pt saw Em White on 2/6/23 and he told them that he would fill out handicap placard form for her and they have not heard if it is done yet-they did not bring form in stated we have them here that he would fill out for her-what is status?

## 2023-02-20 ENCOUNTER — OFFICE VISIT (OUTPATIENT)
Dept: HEMATOLOGY/ONCOLOGY | Facility: HOSPITAL | Age: 83
End: 2023-02-20
Attending: INTERNAL MEDICINE
Payer: MEDICARE

## 2023-02-20 VITALS
WEIGHT: 150.81 LBS | DIASTOLIC BLOOD PRESSURE: 81 MMHG | SYSTOLIC BLOOD PRESSURE: 170 MMHG | BODY MASS INDEX: 25.75 KG/M2 | HEIGHT: 64.02 IN | OXYGEN SATURATION: 94 % | TEMPERATURE: 98 F | HEART RATE: 81 BPM | RESPIRATION RATE: 18 BRPM

## 2023-02-20 DIAGNOSIS — D63.8 ANEMIA OF CHRONIC DISEASE: Primary | ICD-10-CM

## 2023-02-20 DIAGNOSIS — D63.8 ANEMIA OF CHRONIC DISEASE: ICD-10-CM

## 2023-02-20 LAB — HGB BLD-MCNC: 9.6 G/DL

## 2023-02-20 PROCEDURE — 99214 OFFICE O/P EST MOD 30 MIN: CPT | Performed by: INTERNAL MEDICINE

## 2023-02-20 PROCEDURE — 96372 THER/PROPH/DIAG INJ SC/IM: CPT

## 2023-02-20 NOTE — PROGRESS NOTES
Pt here for follow up and possible retracrit injection. No new complaints.     Outpatient Oncology Care Plan  Problem list:  knowledge deficit    Problems related to:    disease/disease progression    Interventions:  provided general teaching    Expected outcomes:  understands plan of care    Progress towards outcome:  making progress    Education Record    Learner:  Patient  Barriers / Limitations:  None  Method:  Brief focused  Outcome:  Shows understanding  Comments:

## 2023-02-20 NOTE — PROGRESS NOTES
Education Record    Learner:  Patient    Disease / Diagnosis: anemia    Barriers / Limitations:  None    Method:  Brief focused, printed material and  reinforcement    General Topics:  Plan of care reviewed    Outcome:  Shows understanding    hgb 9.5.  Seen by MD. Appts made until next MD appt in 3mo

## 2023-03-06 ENCOUNTER — OFFICE VISIT (OUTPATIENT)
Dept: HEMATOLOGY/ONCOLOGY | Facility: HOSPITAL | Age: 83
End: 2023-03-06
Attending: INTERNAL MEDICINE
Payer: MEDICARE

## 2023-03-06 VITALS
OXYGEN SATURATION: 95 % | TEMPERATURE: 98 F | RESPIRATION RATE: 18 BRPM | DIASTOLIC BLOOD PRESSURE: 69 MMHG | HEART RATE: 75 BPM | SYSTOLIC BLOOD PRESSURE: 153 MMHG

## 2023-03-06 DIAGNOSIS — D63.8 ANEMIA OF CHRONIC DISEASE: Primary | ICD-10-CM

## 2023-03-06 LAB
DEPRECATED HBV CORE AB SER IA-ACNC: 526.3 NG/ML
HGB BLD-MCNC: 9.7 G/DL
IRON SATN MFR SERPL: 24 %
IRON SERPL-MCNC: 99 UG/DL
TIBC SERPL-MCNC: 405 UG/DL (ref 240–450)
TRANSFERRIN SERPL-MCNC: 272 MG/DL (ref 200–360)

## 2023-03-06 PROCEDURE — 83540 ASSAY OF IRON: CPT

## 2023-03-06 PROCEDURE — 36415 COLL VENOUS BLD VENIPUNCTURE: CPT

## 2023-03-06 PROCEDURE — 82728 ASSAY OF FERRITIN: CPT

## 2023-03-06 PROCEDURE — 83550 IRON BINDING TEST: CPT

## 2023-03-06 PROCEDURE — 96372 THER/PROPH/DIAG INJ SC/IM: CPT

## 2023-03-06 PROCEDURE — 85018 HEMOGLOBIN: CPT

## 2023-03-06 NOTE — PROGRESS NOTES
Education Record    Learner:  Patient    Disease / Diagnosis: anemia    Barriers / Limitations:  None   Comments:    Method:  Discussion   Comments:    General Topics:  Plan of care reviewed   Comments:    Outcome:  Shows understanding   Comments:    hgb 9.7, retacrit administered per orders. Pt discharged in stable condition.

## 2023-03-20 ENCOUNTER — OFFICE VISIT (OUTPATIENT)
Dept: HEMATOLOGY/ONCOLOGY | Facility: HOSPITAL | Age: 83
End: 2023-03-20
Attending: INTERNAL MEDICINE
Payer: MEDICARE

## 2023-03-20 VITALS
DIASTOLIC BLOOD PRESSURE: 77 MMHG | HEART RATE: 74 BPM | RESPIRATION RATE: 16 BRPM | TEMPERATURE: 97 F | SYSTOLIC BLOOD PRESSURE: 150 MMHG | OXYGEN SATURATION: 97 %

## 2023-03-20 DIAGNOSIS — D63.8 ANEMIA OF CHRONIC DISEASE: Primary | ICD-10-CM

## 2023-03-20 LAB — HGB BLD-MCNC: 10.1 G/DL

## 2023-03-20 PROCEDURE — 85018 HEMOGLOBIN: CPT

## 2023-03-20 PROCEDURE — 36415 COLL VENOUS BLD VENIPUNCTURE: CPT

## 2023-03-20 NOTE — PROGRESS NOTES
Education Record    Learner:  Patient    Disease / Diagnosis: Here for retacrit injection. Barriers / Limitations:  None    Method:  Brief focused, printed material and  reinforcement    General Topics:  Plan of care reviewed    Outcome:  Shows understanding. Labs drawn. Hgb 10.1, no injection indicated today. Left in stable condition. Appt set for next visit.

## 2023-04-03 ENCOUNTER — OFFICE VISIT (OUTPATIENT)
Dept: HEMATOLOGY/ONCOLOGY | Facility: HOSPITAL | Age: 83
End: 2023-04-03
Attending: INTERNAL MEDICINE
Payer: MEDICARE

## 2023-04-03 VITALS
SYSTOLIC BLOOD PRESSURE: 143 MMHG | HEART RATE: 78 BPM | TEMPERATURE: 98 F | OXYGEN SATURATION: 94 % | DIASTOLIC BLOOD PRESSURE: 75 MMHG | RESPIRATION RATE: 16 BRPM

## 2023-04-03 DIAGNOSIS — D63.8 ANEMIA OF CHRONIC DISEASE: Primary | ICD-10-CM

## 2023-04-03 LAB — HGB BLD-MCNC: 9.5 G/DL

## 2023-04-03 PROCEDURE — 96372 THER/PROPH/DIAG INJ SC/IM: CPT

## 2023-04-03 PROCEDURE — 85018 HEMOGLOBIN: CPT

## 2023-04-03 PROCEDURE — 36415 COLL VENOUS BLD VENIPUNCTURE: CPT

## 2023-04-03 NOTE — PROGRESS NOTES
Education Record    Learner:  Patient    Disease / Diagnosis: anemia    Barriers / Limitations:  None    Method:  Brief focused, printed material and  reinforcement    General Topics:  Plan of care reviewed    Outcome:  Shows understanding    hgb 9.5. Retacrit given.

## 2023-04-06 RX ORDER — METOPROLOL SUCCINATE 25 MG/1
TABLET, EXTENDED RELEASE ORAL
Qty: 90 TABLET | Refills: 3 | Status: SHIPPED | OUTPATIENT
Start: 2023-04-06

## 2023-04-17 ENCOUNTER — OFFICE VISIT (OUTPATIENT)
Dept: HEMATOLOGY/ONCOLOGY | Facility: HOSPITAL | Age: 83
End: 2023-04-17
Attending: INTERNAL MEDICINE
Payer: MEDICARE

## 2023-04-17 VITALS
OXYGEN SATURATION: 97 % | RESPIRATION RATE: 20 BRPM | DIASTOLIC BLOOD PRESSURE: 74 MMHG | TEMPERATURE: 97 F | SYSTOLIC BLOOD PRESSURE: 157 MMHG | HEART RATE: 84 BPM

## 2023-04-17 DIAGNOSIS — D63.8 ANEMIA OF CHRONIC DISEASE: Primary | ICD-10-CM

## 2023-04-17 LAB — HGB BLD-MCNC: 9.6 G/DL

## 2023-04-17 PROCEDURE — 36415 COLL VENOUS BLD VENIPUNCTURE: CPT

## 2023-04-17 PROCEDURE — 96372 THER/PROPH/DIAG INJ SC/IM: CPT

## 2023-04-17 PROCEDURE — 85018 HEMOGLOBIN: CPT

## 2023-04-17 NOTE — PROGRESS NOTES
Education Record    Learner:  Patient    Disease / Napolean Grater    Barriers / Limitations:  None   Comments:    Method:  Brief focused   Comments:    General Topics:  Infection, Medication, Pain, Precautions, Procedure, Side effects and symptom management, Plan of care reviewed and Fall risk and prevention   Comments:    Outcome:  Shows understanding   Comments:    Received Retacrit 47842 for Hg=9.6

## 2023-05-01 ENCOUNTER — OFFICE VISIT (OUTPATIENT)
Dept: HEMATOLOGY/ONCOLOGY | Facility: HOSPITAL | Age: 83
End: 2023-05-01
Attending: INTERNAL MEDICINE
Payer: MEDICARE

## 2023-05-01 VITALS
RESPIRATION RATE: 16 BRPM | TEMPERATURE: 98 F | OXYGEN SATURATION: 93 % | DIASTOLIC BLOOD PRESSURE: 67 MMHG | HEART RATE: 74 BPM | SYSTOLIC BLOOD PRESSURE: 152 MMHG

## 2023-05-01 DIAGNOSIS — D63.8 ANEMIA OF CHRONIC DISEASE: Primary | ICD-10-CM

## 2023-05-01 LAB — HGB BLD-MCNC: 9.6 G/DL

## 2023-05-01 PROCEDURE — 96372 THER/PROPH/DIAG INJ SC/IM: CPT

## 2023-05-01 PROCEDURE — 36415 COLL VENOUS BLD VENIPUNCTURE: CPT

## 2023-05-01 PROCEDURE — 85018 HEMOGLOBIN: CPT

## 2023-05-01 NOTE — PROGRESS NOTES
Education Record    Learner:  Patient    Disease / Diagnosis: here for labs and possible retacrit    Barriers / Limitations:  None   Comments:    Method:  Brief focused and Discussion   Comments:    General Topics:  Plan of care reviewed   Comments:    Outcome:  Shows understanding   Comments:    HGB 9.6. Retacrit given per MD order without incident. Tolerated well. Per Dr Annie Haywood note Feb 2023, pt to see MD in May. Pt informed and appointment requested. Printed AVS given and reviewed. Discharged home in stable condition, no new complaints.

## 2023-05-15 ENCOUNTER — OFFICE VISIT (OUTPATIENT)
Dept: HEMATOLOGY/ONCOLOGY | Facility: HOSPITAL | Age: 83
End: 2023-05-15
Attending: INTERNAL MEDICINE
Payer: MEDICARE

## 2023-05-15 VITALS
BODY MASS INDEX: 25.44 KG/M2 | HEART RATE: 70 BPM | WEIGHT: 149 LBS | OXYGEN SATURATION: 95 % | RESPIRATION RATE: 16 BRPM | SYSTOLIC BLOOD PRESSURE: 149 MMHG | HEIGHT: 64.02 IN | DIASTOLIC BLOOD PRESSURE: 71 MMHG | TEMPERATURE: 98 F

## 2023-05-15 DIAGNOSIS — M35.3 PMR (POLYMYALGIA RHEUMATICA) (HCC): Primary | ICD-10-CM

## 2023-05-15 DIAGNOSIS — D63.8 ANEMIA OF CHRONIC DISEASE: Primary | ICD-10-CM

## 2023-05-15 DIAGNOSIS — D63.8 ANEMIA OF CHRONIC DISEASE: ICD-10-CM

## 2023-05-15 LAB
ALBUMIN SERPL-MCNC: 3.5 G/DL (ref 3.4–5)
ALBUMIN/GLOB SERPL: 1 {RATIO} (ref 1–2)
ALP LIVER SERPL-CCNC: 58 U/L
ALT SERPL-CCNC: 20 U/L
ANION GAP SERPL CALC-SCNC: 4 MMOL/L (ref 0–18)
AST SERPL-CCNC: 24 U/L (ref 15–37)
BASOPHILS # BLD AUTO: 0.03 X10(3) UL (ref 0–0.2)
BASOPHILS NFR BLD AUTO: 0.5 %
BILIRUB SERPL-MCNC: 1.2 MG/DL (ref 0.1–2)
BUN BLD-MCNC: 22 MG/DL (ref 7–18)
CALCIUM BLD-MCNC: 9 MG/DL (ref 8.5–10.1)
CHLORIDE SERPL-SCNC: 106 MMOL/L (ref 98–112)
CO2 SERPL-SCNC: 27 MMOL/L (ref 21–32)
CREAT BLD-MCNC: 0.71 MG/DL
DEPRECATED HBV CORE AB SER IA-ACNC: 583.6 NG/ML
EOSINOPHIL # BLD AUTO: 0.37 X10(3) UL (ref 0–0.7)
EOSINOPHIL NFR BLD AUTO: 5.6 %
ERYTHROCYTE [DISTWIDTH] IN BLOOD BY AUTOMATED COUNT: 24.1 %
GFR SERPLBLD BASED ON 1.73 SQ M-ARVRAT: 85 ML/MIN/1.73M2 (ref 60–?)
GLOBULIN PLAS-MCNC: 3.5 G/DL (ref 2.8–4.4)
GLUCOSE BLD-MCNC: 143 MG/DL (ref 70–99)
HCT VFR BLD AUTO: 29 %
HGB BLD-MCNC: 9.4 G/DL
IMM GRANULOCYTES # BLD AUTO: 0.01 X10(3) UL (ref 0–1)
IMM GRANULOCYTES NFR BLD: 0.2 %
IRON SATN MFR SERPL: 33 %
IRON SERPL-MCNC: 115 UG/DL
LYMPHOCYTES # BLD AUTO: 2.33 X10(3) UL (ref 1–4)
LYMPHOCYTES NFR BLD AUTO: 35.5 %
MCH RBC QN AUTO: 33 PG (ref 26–34)
MCHC RBC AUTO-ENTMCNC: 32.4 G/DL (ref 31–37)
MCV RBC AUTO: 101.8 FL
MONOCYTES # BLD AUTO: 0.48 X10(3) UL (ref 0.1–1)
MONOCYTES NFR BLD AUTO: 7.3 %
NEUTROPHILS # BLD AUTO: 3.34 X10 (3) UL (ref 1.5–7.7)
NEUTROPHILS # BLD AUTO: 3.34 X10(3) UL (ref 1.5–7.7)
NEUTROPHILS NFR BLD AUTO: 50.9 %
OSMOLALITY SERPL CALC.SUM OF ELEC: 290 MOSM/KG (ref 275–295)
PLATELET # BLD AUTO: 279 10(3)UL (ref 150–450)
PLATELET MORPHOLOGY: NORMAL
POTASSIUM SERPL-SCNC: 4.2 MMOL/L (ref 3.5–5.1)
PROT SERPL-MCNC: 7 G/DL (ref 6.4–8.2)
RBC # BLD AUTO: 2.85 X10(6)UL
SODIUM SERPL-SCNC: 137 MMOL/L (ref 136–145)
TIBC SERPL-MCNC: 344 UG/DL (ref 240–450)
TRANSFERRIN SERPL-MCNC: 231 MG/DL (ref 200–360)
WBC # BLD AUTO: 6.6 X10(3) UL (ref 4–11)

## 2023-05-15 PROCEDURE — 96372 THER/PROPH/DIAG INJ SC/IM: CPT

## 2023-05-15 PROCEDURE — 99214 OFFICE O/P EST MOD 30 MIN: CPT | Performed by: INTERNAL MEDICINE

## 2023-05-15 NOTE — PROGRESS NOTES
Education Record    Learner:  Patient    Disease / Diagnosis: Anemia    Barriers / Limitations:  None   Comments:    Method:  Brief focused   Comments:    General Topics:  Plan of care reviewed   Comments:    Outcome:  Shows understanding   Comments:

## 2023-05-15 NOTE — PROGRESS NOTES
Pt here for follow up and injection. She complains of fatigue and occasional SOB.     Outpatient Oncology Care Plan  Problem list:  knowledge deficit    Problems related to:    disease/disease progression    Interventions:  provided general teaching    Expected outcomes:  understands plan of care    Progress towards outcome:  making progress    Education Record    Learner:  Patient  Barriers / Limitations:  None  Method:  Brief focused  Outcome:  Shows understanding  Comments:

## 2023-05-30 ENCOUNTER — OFFICE VISIT (OUTPATIENT)
Dept: HEMATOLOGY/ONCOLOGY | Facility: HOSPITAL | Age: 83
End: 2023-05-30
Attending: INTERNAL MEDICINE
Payer: MEDICARE

## 2023-05-30 VITALS
TEMPERATURE: 98 F | DIASTOLIC BLOOD PRESSURE: 65 MMHG | SYSTOLIC BLOOD PRESSURE: 123 MMHG | HEART RATE: 72 BPM | OXYGEN SATURATION: 98 % | RESPIRATION RATE: 20 BRPM

## 2023-05-30 DIAGNOSIS — D63.8 ANEMIA OF CHRONIC DISEASE: Primary | ICD-10-CM

## 2023-05-30 LAB — HGB BLD-MCNC: 9.8 G/DL

## 2023-05-30 PROCEDURE — 36415 COLL VENOUS BLD VENIPUNCTURE: CPT

## 2023-05-30 PROCEDURE — 96372 THER/PROPH/DIAG INJ SC/IM: CPT

## 2023-05-30 PROCEDURE — 85018 HEMOGLOBIN: CPT

## 2023-05-30 NOTE — PROGRESS NOTES
Education Record    Learner:  Patient    Disease / Diagnosis: Retacrit injection    Barriers / Limitations:  None   Comments:    Method:  Brief focused and Reinforcement   Comments:    General Topics:  Diet, Medication, Side effects and symptom management and Plan of care reviewed   Comments:    Outcome:  Shows understanding   Comments: Patient tolerated injection and discharged in stable condition.

## 2023-06-12 ENCOUNTER — OFFICE VISIT (OUTPATIENT)
Dept: HEMATOLOGY/ONCOLOGY | Facility: HOSPITAL | Age: 83
End: 2023-06-12
Attending: INTERNAL MEDICINE
Payer: MEDICARE

## 2023-06-12 VITALS
RESPIRATION RATE: 16 BRPM | DIASTOLIC BLOOD PRESSURE: 49 MMHG | TEMPERATURE: 98 F | HEART RATE: 55 BPM | HEIGHT: 64.02 IN | WEIGHT: 149 LBS | BODY MASS INDEX: 25.44 KG/M2 | SYSTOLIC BLOOD PRESSURE: 147 MMHG | OXYGEN SATURATION: 97 %

## 2023-06-12 DIAGNOSIS — D63.8 ANEMIA OF CHRONIC DISEASE: Primary | ICD-10-CM

## 2023-06-12 LAB — HGB BLD-MCNC: 10.5 G/DL

## 2023-06-12 PROCEDURE — 85018 HEMOGLOBIN: CPT

## 2023-06-12 PROCEDURE — 36415 COLL VENOUS BLD VENIPUNCTURE: CPT

## 2023-06-12 NOTE — PROGRESS NOTES
Education Record    Learner:  Patient    Disease / Diagnosis: Anemia    Barriers / Limitations:  None   Comments:    Method:  Brief focused   Comments:    General Topics:  Plan of care reviewed   Comments:    Outcome:  Shows understanding   Comments:    hgb 10.5.  Retacrit held

## 2023-06-26 ENCOUNTER — OFFICE VISIT (OUTPATIENT)
Dept: HEMATOLOGY/ONCOLOGY | Facility: HOSPITAL | Age: 83
End: 2023-06-26
Attending: INTERNAL MEDICINE
Payer: MEDICARE

## 2023-06-26 VITALS
HEART RATE: 70 BPM | DIASTOLIC BLOOD PRESSURE: 51 MMHG | RESPIRATION RATE: 20 BRPM | OXYGEN SATURATION: 98 % | SYSTOLIC BLOOD PRESSURE: 156 MMHG | TEMPERATURE: 97 F

## 2023-06-26 DIAGNOSIS — D63.8 ANEMIA OF CHRONIC DISEASE: Primary | ICD-10-CM

## 2023-06-26 LAB — HGB BLD-MCNC: 9.8 G/DL

## 2023-06-26 PROCEDURE — 36415 COLL VENOUS BLD VENIPUNCTURE: CPT

## 2023-06-26 PROCEDURE — 85018 HEMOGLOBIN: CPT

## 2023-06-26 PROCEDURE — 96372 THER/PROPH/DIAG INJ SC/IM: CPT

## 2023-06-26 NOTE — PROGRESS NOTES
Education Record    Learner:  Patient     Disease / Diagnosis: anemia    Barriers / Limitations:  None    Method:  Brief focused, printed material and  reinforcement    General Topics:  Plan of care reviewed    Outcome:  Shows understanding    Hgb 9.8, retacrit given.  Has appts

## 2023-07-10 ENCOUNTER — OFFICE VISIT (OUTPATIENT)
Dept: HEMATOLOGY/ONCOLOGY | Facility: HOSPITAL | Age: 83
End: 2023-07-10
Attending: INTERNAL MEDICINE
Payer: MEDICARE

## 2023-07-10 VITALS
SYSTOLIC BLOOD PRESSURE: 127 MMHG | HEART RATE: 74 BPM | RESPIRATION RATE: 18 BRPM | TEMPERATURE: 98 F | OXYGEN SATURATION: 98 % | DIASTOLIC BLOOD PRESSURE: 52 MMHG

## 2023-07-10 DIAGNOSIS — D63.8 ANEMIA OF CHRONIC DISEASE: Primary | ICD-10-CM

## 2023-07-10 PROCEDURE — 36415 COLL VENOUS BLD VENIPUNCTURE: CPT

## 2023-07-10 PROCEDURE — 96372 THER/PROPH/DIAG INJ SC/IM: CPT

## 2023-07-24 ENCOUNTER — OFFICE VISIT (OUTPATIENT)
Dept: HEMATOLOGY/ONCOLOGY | Facility: HOSPITAL | Age: 83
End: 2023-07-24
Attending: INTERNAL MEDICINE
Payer: MEDICARE

## 2023-07-24 VITALS
DIASTOLIC BLOOD PRESSURE: 61 MMHG | SYSTOLIC BLOOD PRESSURE: 149 MMHG | HEART RATE: 71 BPM | BODY MASS INDEX: 25 KG/M2 | RESPIRATION RATE: 16 BRPM | TEMPERATURE: 97 F | OXYGEN SATURATION: 94 % | WEIGHT: 146.38 LBS

## 2023-07-24 DIAGNOSIS — D63.8 ANEMIA OF CHRONIC DISEASE: Primary | ICD-10-CM

## 2023-07-24 LAB — HGB BLD-MCNC: 10 G/DL

## 2023-07-24 PROCEDURE — 85018 HEMOGLOBIN: CPT

## 2023-07-24 PROCEDURE — 36415 COLL VENOUS BLD VENIPUNCTURE: CPT

## 2023-07-24 NOTE — PROGRESS NOTES
Education Record    Learner:  Patient    Disease / Diagnosis: anemia    Barriers / Limitations:  None    Method:  Brief focused, printed material and  reinforcement    General Topics:  Plan of care reviewed    Outcome:  Shows understanding    Hgb 10.0, no retacrit needed for 10.0 or greater. Pt left in stable condition, has appts.

## 2023-08-07 ENCOUNTER — OFFICE VISIT (OUTPATIENT)
Dept: HEMATOLOGY/ONCOLOGY | Facility: HOSPITAL | Age: 83
End: 2023-08-07
Attending: INTERNAL MEDICINE
Payer: MEDICARE

## 2023-08-07 VITALS
HEART RATE: 78 BPM | WEIGHT: 146 LBS | BODY MASS INDEX: 24.92 KG/M2 | RESPIRATION RATE: 16 BRPM | HEIGHT: 64.02 IN | DIASTOLIC BLOOD PRESSURE: 73 MMHG | OXYGEN SATURATION: 97 % | TEMPERATURE: 98 F | SYSTOLIC BLOOD PRESSURE: 150 MMHG

## 2023-08-07 DIAGNOSIS — D63.8 ANEMIA OF CHRONIC DISEASE: Primary | ICD-10-CM

## 2023-08-07 LAB — HGB BLD-MCNC: 9.5 G/DL

## 2023-08-07 PROCEDURE — 85018 HEMOGLOBIN: CPT

## 2023-08-07 PROCEDURE — 96372 THER/PROPH/DIAG INJ SC/IM: CPT

## 2023-08-07 PROCEDURE — 36415 COLL VENOUS BLD VENIPUNCTURE: CPT

## 2023-08-07 NOTE — PROGRESS NOTES
Education Record    Learner:  Patient    Disease / Diagnosis: anemia    Barriers / Limitations:  None   Comments:    Method:  Discussion   Comments:    General Topics:  Plan of care reviewed   Comments:    Outcome:  Shows understanding   Comments:    Hgb 9.5 today, retacrit inj administered per orders. Pt discharged in stable condition.

## 2023-08-11 ENCOUNTER — OFFICE VISIT (OUTPATIENT)
Dept: INTERNAL MEDICINE CLINIC | Facility: CLINIC | Age: 83
End: 2023-08-11
Payer: MEDICARE

## 2023-08-11 VITALS
WEIGHT: 147.19 LBS | BODY MASS INDEX: 25.13 KG/M2 | HEIGHT: 64 IN | HEART RATE: 78 BPM | SYSTOLIC BLOOD PRESSURE: 110 MMHG | OXYGEN SATURATION: 99 % | RESPIRATION RATE: 16 BRPM | DIASTOLIC BLOOD PRESSURE: 60 MMHG

## 2023-08-11 DIAGNOSIS — E78.2 MIXED HYPERLIPIDEMIA: Primary | ICD-10-CM

## 2023-08-11 DIAGNOSIS — D89.89 AUTOIMMUNE DISORDER (HCC): ICD-10-CM

## 2023-08-11 DIAGNOSIS — I10 PRIMARY HYPERTENSION: ICD-10-CM

## 2023-08-11 DIAGNOSIS — F41.9 ANXIETY: ICD-10-CM

## 2023-08-11 DIAGNOSIS — M35.00 SJOGREN'S SYNDROME WITHOUT EXTRAGLANDULAR INVOLVEMENT (HCC): ICD-10-CM

## 2023-08-11 PROCEDURE — 3074F SYST BP LT 130 MM HG: CPT | Performed by: INTERNAL MEDICINE

## 2023-08-11 PROCEDURE — 1159F MED LIST DOCD IN RCRD: CPT | Performed by: INTERNAL MEDICINE

## 2023-08-11 PROCEDURE — 3078F DIAST BP <80 MM HG: CPT | Performed by: INTERNAL MEDICINE

## 2023-08-11 PROCEDURE — 1160F RVW MEDS BY RX/DR IN RCRD: CPT | Performed by: INTERNAL MEDICINE

## 2023-08-11 PROCEDURE — 99214 OFFICE O/P EST MOD 30 MIN: CPT | Performed by: INTERNAL MEDICINE

## 2023-08-11 PROCEDURE — 3008F BODY MASS INDEX DOCD: CPT | Performed by: INTERNAL MEDICINE

## 2023-08-11 RX ORDER — ROSUVASTATIN CALCIUM 5 MG/1
TABLET, COATED ORAL
COMMUNITY
Start: 2023-04-25

## 2023-08-11 RX ORDER — HYDROXYCHLOROQUINE SULFATE 200 MG/1
300 TABLET, FILM COATED ORAL DAILY
COMMUNITY
Start: 2023-05-18

## 2023-08-21 ENCOUNTER — OFFICE VISIT (OUTPATIENT)
Dept: HEMATOLOGY/ONCOLOGY | Facility: HOSPITAL | Age: 83
End: 2023-08-21
Attending: INTERNAL MEDICINE
Payer: MEDICARE

## 2023-08-21 VITALS
HEART RATE: 64 BPM | OXYGEN SATURATION: 96 % | SYSTOLIC BLOOD PRESSURE: 154 MMHG | RESPIRATION RATE: 18 BRPM | HEIGHT: 64.02 IN | WEIGHT: 146.63 LBS | BODY MASS INDEX: 25.03 KG/M2 | DIASTOLIC BLOOD PRESSURE: 65 MMHG

## 2023-08-21 DIAGNOSIS — D63.8 ANEMIA OF CHRONIC DISEASE: Primary | ICD-10-CM

## 2023-08-21 LAB
ALBUMIN SERPL-MCNC: 3.7 G/DL (ref 3.4–5)
ALBUMIN/GLOB SERPL: 1.1 {RATIO} (ref 1–2)
ALP LIVER SERPL-CCNC: 58 U/L
ALT SERPL-CCNC: 23 U/L
ANION GAP SERPL CALC-SCNC: 6 MMOL/L (ref 0–18)
AST SERPL-CCNC: 22 U/L (ref 15–37)
BASOPHILS # BLD AUTO: 0.04 X10(3) UL (ref 0–0.2)
BASOPHILS NFR BLD AUTO: 0.6 %
BILIRUB SERPL-MCNC: 1.3 MG/DL (ref 0.1–2)
BUN BLD-MCNC: 18 MG/DL (ref 7–18)
CALCIUM BLD-MCNC: 9.4 MG/DL (ref 8.5–10.1)
CHLORIDE SERPL-SCNC: 106 MMOL/L (ref 98–112)
CO2 SERPL-SCNC: 26 MMOL/L (ref 21–32)
CREAT BLD-MCNC: 0.75 MG/DL
DEPRECATED HBV CORE AB SER IA-ACNC: 451.9 NG/ML
EGFRCR SERPLBLD CKD-EPI 2021: 79 ML/MIN/1.73M2 (ref 60–?)
EOSINOPHIL # BLD AUTO: 0.42 X10(3) UL (ref 0–0.7)
EOSINOPHIL NFR BLD AUTO: 6.8 %
ERYTHROCYTE [DISTWIDTH] IN BLOOD BY AUTOMATED COUNT: 23.4 %
GLOBULIN PLAS-MCNC: 3.3 G/DL (ref 2.8–4.4)
GLUCOSE BLD-MCNC: 104 MG/DL (ref 70–99)
HCT VFR BLD AUTO: 28 %
HGB BLD-MCNC: 9.2 G/DL
IMM GRANULOCYTES # BLD AUTO: 0.01 X10(3) UL (ref 0–1)
IMM GRANULOCYTES NFR BLD: 0.2 %
IRON SATN MFR SERPL: 40 %
IRON SERPL-MCNC: 138 UG/DL
LYMPHOCYTES # BLD AUTO: 2.23 X10(3) UL (ref 1–4)
LYMPHOCYTES NFR BLD AUTO: 36 %
MCH RBC QN AUTO: 31.9 PG (ref 26–34)
MCHC RBC AUTO-ENTMCNC: 32.9 G/DL (ref 31–37)
MCV RBC AUTO: 97.2 FL
MONOCYTES # BLD AUTO: 0.55 X10(3) UL (ref 0.1–1)
MONOCYTES NFR BLD AUTO: 8.9 %
NEUTROPHILS # BLD AUTO: 2.95 X10 (3) UL (ref 1.5–7.7)
NEUTROPHILS # BLD AUTO: 2.95 X10(3) UL (ref 1.5–7.7)
NEUTROPHILS NFR BLD AUTO: 47.5 %
OSMOLALITY SERPL CALC.SUM OF ELEC: 288 MOSM/KG (ref 275–295)
PLATELET # BLD AUTO: 271 10(3)UL (ref 150–450)
PLATELET MORPHOLOGY: NORMAL
POTASSIUM SERPL-SCNC: 4.1 MMOL/L (ref 3.5–5.1)
PROT SERPL-MCNC: 7 G/DL (ref 6.4–8.2)
RBC # BLD AUTO: 2.88 X10(6)UL
SODIUM SERPL-SCNC: 138 MMOL/L (ref 136–145)
TIBC SERPL-MCNC: 347 UG/DL (ref 240–450)
TRANSFERRIN SERPL-MCNC: 233 MG/DL (ref 200–360)
WBC # BLD AUTO: 6.2 X10(3) UL (ref 4–11)

## 2023-08-21 PROCEDURE — 80053 COMPREHEN METABOLIC PANEL: CPT | Performed by: INTERNAL MEDICINE

## 2023-08-21 PROCEDURE — 36415 COLL VENOUS BLD VENIPUNCTURE: CPT

## 2023-08-21 PROCEDURE — 82728 ASSAY OF FERRITIN: CPT | Performed by: INTERNAL MEDICINE

## 2023-08-21 PROCEDURE — 83550 IRON BINDING TEST: CPT | Performed by: INTERNAL MEDICINE

## 2023-08-21 PROCEDURE — 85025 COMPLETE CBC W/AUTO DIFF WBC: CPT | Performed by: INTERNAL MEDICINE

## 2023-08-21 PROCEDURE — 83540 ASSAY OF IRON: CPT | Performed by: INTERNAL MEDICINE

## 2023-08-21 NOTE — PROGRESS NOTES
Pt arrived amb for PL and poss Retacrit. Pt met criteria for Retacrit. Given per MD order. Pt left amb. Future appointments requested.

## 2023-09-05 ENCOUNTER — OFFICE VISIT (OUTPATIENT)
Dept: HEMATOLOGY/ONCOLOGY | Facility: HOSPITAL | Age: 83
End: 2023-09-05
Attending: INTERNAL MEDICINE
Payer: MEDICARE

## 2023-09-05 VITALS
SYSTOLIC BLOOD PRESSURE: 124 MMHG | OXYGEN SATURATION: 98 % | WEIGHT: 144.38 LBS | BODY MASS INDEX: 25 KG/M2 | HEART RATE: 76 BPM | DIASTOLIC BLOOD PRESSURE: 71 MMHG | TEMPERATURE: 98 F | RESPIRATION RATE: 16 BRPM

## 2023-09-05 DIAGNOSIS — D63.8 ANEMIA OF CHRONIC DISEASE: Primary | ICD-10-CM

## 2023-09-05 LAB — HGB BLD-MCNC: 9.5 G/DL

## 2023-09-05 PROCEDURE — 96372 THER/PROPH/DIAG INJ SC/IM: CPT

## 2023-09-05 PROCEDURE — 36415 COLL VENOUS BLD VENIPUNCTURE: CPT

## 2023-09-05 PROCEDURE — 85018 HEMOGLOBIN: CPT

## 2023-09-05 NOTE — PROGRESS NOTES
Education Record    Learner:  Patient     Disease / Diagnosis: anemia     Barriers / Limitations:  None   Comments:    Method:  Discussion   Comments:    General Topics:  Medication and Plan of care reviewed   Comments:    Outcome:  Shows understanding   Comments:    Hgb 9.5 today, retacrit inj administered per orders. Pt discharged in stable condition.

## 2023-09-18 ENCOUNTER — OFFICE VISIT (OUTPATIENT)
Dept: HEMATOLOGY/ONCOLOGY | Facility: HOSPITAL | Age: 83
End: 2023-09-18
Attending: INTERNAL MEDICINE
Payer: MEDICARE

## 2023-09-18 VITALS
SYSTOLIC BLOOD PRESSURE: 156 MMHG | WEIGHT: 146.38 LBS | DIASTOLIC BLOOD PRESSURE: 77 MMHG | OXYGEN SATURATION: 97 % | HEIGHT: 64.02 IN | TEMPERATURE: 98 F | HEART RATE: 66 BPM | BODY MASS INDEX: 24.99 KG/M2

## 2023-09-18 DIAGNOSIS — D63.8 ANEMIA OF CHRONIC DISEASE: Primary | ICD-10-CM

## 2023-09-18 LAB — HGB BLD-MCNC: 10 G/DL

## 2023-09-18 PROCEDURE — 99211 OFF/OP EST MAY X REQ PHY/QHP: CPT

## 2023-09-18 PROCEDURE — 85018 HEMOGLOBIN: CPT

## 2023-09-18 PROCEDURE — 36415 COLL VENOUS BLD VENIPUNCTURE: CPT

## 2023-10-02 ENCOUNTER — OFFICE VISIT (OUTPATIENT)
Dept: HEMATOLOGY/ONCOLOGY | Facility: HOSPITAL | Age: 83
End: 2023-10-02
Attending: INTERNAL MEDICINE
Payer: MEDICARE

## 2023-10-02 VITALS
OXYGEN SATURATION: 97 % | HEART RATE: 71 BPM | BODY MASS INDEX: 24.86 KG/M2 | DIASTOLIC BLOOD PRESSURE: 67 MMHG | RESPIRATION RATE: 16 BRPM | WEIGHT: 145.63 LBS | TEMPERATURE: 98 F | HEIGHT: 64.02 IN | SYSTOLIC BLOOD PRESSURE: 169 MMHG

## 2023-10-02 DIAGNOSIS — D63.8 ANEMIA OF CHRONIC DISEASE: Primary | ICD-10-CM

## 2023-10-02 LAB — HGB BLD-MCNC: 8.9 G/DL

## 2023-10-02 PROCEDURE — 96372 THER/PROPH/DIAG INJ SC/IM: CPT

## 2023-10-02 PROCEDURE — 85018 HEMOGLOBIN: CPT

## 2023-10-02 PROCEDURE — 36415 COLL VENOUS BLD VENIPUNCTURE: CPT

## 2023-10-16 ENCOUNTER — OFFICE VISIT (OUTPATIENT)
Dept: HEMATOLOGY/ONCOLOGY | Facility: HOSPITAL | Age: 83
End: 2023-10-16
Attending: INTERNAL MEDICINE
Payer: MEDICARE

## 2023-10-16 VITALS
HEART RATE: 76 BPM | TEMPERATURE: 98 F | RESPIRATION RATE: 18 BRPM | SYSTOLIC BLOOD PRESSURE: 145 MMHG | OXYGEN SATURATION: 97 % | DIASTOLIC BLOOD PRESSURE: 74 MMHG

## 2023-10-16 DIAGNOSIS — D63.8 ANEMIA OF CHRONIC DISEASE: Primary | ICD-10-CM

## 2023-10-16 LAB — HGB BLD-MCNC: 9.4 G/DL

## 2023-10-16 PROCEDURE — 96372 THER/PROPH/DIAG INJ SC/IM: CPT

## 2023-10-16 PROCEDURE — 85018 HEMOGLOBIN: CPT

## 2023-10-16 PROCEDURE — 36415 COLL VENOUS BLD VENIPUNCTURE: CPT

## 2023-10-16 NOTE — PROGRESS NOTES
Education Record    Learner:  Patient    Disease / Sammy Andrew of chronic disease     Barriers / Limitations:  None   Comments:    Method:  Brief focused   Comments:    General Topics:  Diet, Infection, Medication, Pain, Precautions, Procedure, Side effects and symptom management, Plan of care reviewed, and Fall risk and prevention   Comments:    Outcome:  Shows understanding   Comments:    Hg=9.4 given Retacrit 30 000 units SQ

## 2023-10-30 ENCOUNTER — OFFICE VISIT (OUTPATIENT)
Dept: HEMATOLOGY/ONCOLOGY | Facility: HOSPITAL | Age: 83
End: 2023-10-30
Attending: INTERNAL MEDICINE

## 2023-10-30 VITALS
OXYGEN SATURATION: 96 % | SYSTOLIC BLOOD PRESSURE: 162 MMHG | TEMPERATURE: 97 F | DIASTOLIC BLOOD PRESSURE: 78 MMHG | BODY MASS INDEX: 24.52 KG/M2 | WEIGHT: 143.63 LBS | HEIGHT: 64.02 IN | RESPIRATION RATE: 16 BRPM | HEART RATE: 78 BPM

## 2023-10-30 DIAGNOSIS — D63.8 ANEMIA OF CHRONIC DISEASE: Primary | ICD-10-CM

## 2023-10-30 LAB
HEMOGLOBIN POC: 10.7 G/DL
INR CARTRIDGE LOT #: ABNORMAL
INR: 10.7 (ref 0.8–1.3)

## 2023-10-30 PROCEDURE — 36416 COLLJ CAPILLARY BLOOD SPEC: CPT

## 2023-10-30 PROCEDURE — 85018 HEMOGLOBIN: CPT

## 2023-11-10 ENCOUNTER — OFFICE VISIT (OUTPATIENT)
Dept: HEMATOLOGY/ONCOLOGY | Facility: HOSPITAL | Age: 83
End: 2023-11-10
Attending: INTERNAL MEDICINE
Payer: MEDICARE

## 2023-11-10 VITALS
TEMPERATURE: 98 F | SYSTOLIC BLOOD PRESSURE: 143 MMHG | RESPIRATION RATE: 20 BRPM | HEART RATE: 90 BPM | OXYGEN SATURATION: 96 % | DIASTOLIC BLOOD PRESSURE: 83 MMHG

## 2023-11-10 DIAGNOSIS — D63.8 ANEMIA OF CHRONIC DISEASE: Primary | ICD-10-CM

## 2023-11-10 LAB — HEMOGLOBIN POC: 7.5 G/DL

## 2023-11-10 PROCEDURE — 36416 COLLJ CAPILLARY BLOOD SPEC: CPT

## 2023-11-10 PROCEDURE — 96372 THER/PROPH/DIAG INJ SC/IM: CPT

## 2023-11-10 PROCEDURE — 85018 HEMOGLOBIN: CPT

## 2023-11-10 NOTE — PROGRESS NOTES
Education Record    Learner:  Patient    Disease / Diagnosis:poss retacrit    Barriers / Limitations:  None   Comments:    Method:  Discussion   Comments:    General Topics:  Medication and Plan of care reviewed   Comments:    Outcome:  Shows understanding   Comments:Hgb 7.5, retacrit given. Appointment scheduled for 2 weeks.

## 2023-11-13 ENCOUNTER — APPOINTMENT (OUTPATIENT)
Dept: HEMATOLOGY/ONCOLOGY | Facility: HOSPITAL | Age: 83
End: 2023-11-13
Attending: INTERNAL MEDICINE
Payer: MEDICARE

## 2023-11-27 ENCOUNTER — OFFICE VISIT (OUTPATIENT)
Dept: HEMATOLOGY/ONCOLOGY | Facility: HOSPITAL | Age: 83
End: 2023-11-27
Attending: INTERNAL MEDICINE
Payer: MEDICARE

## 2023-11-27 VITALS
HEIGHT: 64.02 IN | BODY MASS INDEX: 24.5 KG/M2 | OXYGEN SATURATION: 96 % | RESPIRATION RATE: 18 BRPM | SYSTOLIC BLOOD PRESSURE: 141 MMHG | DIASTOLIC BLOOD PRESSURE: 74 MMHG | TEMPERATURE: 98 F | WEIGHT: 143.5 LBS | HEART RATE: 77 BPM

## 2023-11-27 DIAGNOSIS — M35.3 PMR (POLYMYALGIA RHEUMATICA) (HCC): ICD-10-CM

## 2023-11-27 DIAGNOSIS — D63.8 ANEMIA OF CHRONIC DISEASE: Primary | ICD-10-CM

## 2023-11-27 LAB
BASOPHILS # BLD AUTO: 0.05 X10(3) UL (ref 0–0.2)
BASOPHILS NFR BLD AUTO: 0.7 %
DEPRECATED HBV CORE AB SER IA-ACNC: 729.2 NG/ML
EOSINOPHIL # BLD AUTO: 0.52 X10(3) UL (ref 0–0.7)
EOSINOPHIL NFR BLD AUTO: 7.7 %
ERYTHROCYTE [DISTWIDTH] IN BLOOD BY AUTOMATED COUNT: 23.8 %
HCT VFR BLD AUTO: 29.8 %
HGB BLD-MCNC: 9.4 G/DL
IMM GRANULOCYTES # BLD AUTO: 0.02 X10(3) UL (ref 0–1)
IMM GRANULOCYTES NFR BLD: 0.3 %
IRON SATN MFR SERPL: 50 %
IRON SERPL-MCNC: 175 UG/DL
LYMPHOCYTES # BLD AUTO: 3.05 X10(3) UL (ref 1–4)
LYMPHOCYTES NFR BLD AUTO: 45.2 %
MCH RBC QN AUTO: 32.2 PG (ref 26–34)
MCHC RBC AUTO-ENTMCNC: 31.5 G/DL (ref 31–37)
MCV RBC AUTO: 102.1 FL
MONOCYTES # BLD AUTO: 0.53 X10(3) UL (ref 0.1–1)
MONOCYTES NFR BLD AUTO: 7.9 %
NEUTROPHILS # BLD AUTO: 2.58 X10 (3) UL (ref 1.5–7.7)
NEUTROPHILS # BLD AUTO: 2.58 X10(3) UL (ref 1.5–7.7)
NEUTROPHILS NFR BLD AUTO: 38.2 %
PLATELET # BLD AUTO: 314 10(3)UL (ref 150–450)
PLATELET MORPHOLOGY: NORMAL
RBC # BLD AUTO: 2.92 X10(6)UL
TIBC SERPL-MCNC: 349 UG/DL (ref 240–450)
TRANSFERRIN SERPL-MCNC: 234 MG/DL (ref 200–360)
WBC # BLD AUTO: 6.8 X10(3) UL (ref 4–11)

## 2023-11-27 PROCEDURE — 99214 OFFICE O/P EST MOD 30 MIN: CPT | Performed by: INTERNAL MEDICINE

## 2023-11-27 PROCEDURE — 96372 THER/PROPH/DIAG INJ SC/IM: CPT

## 2023-11-27 NOTE — PROGRESS NOTES
Cancer Center Progress Note  Patient Name: Zoila Vidal   YOB: 1940   Medical Record Number: [id number]   CSN: [id number]   Attending Physician: Rubens Loyola M.D. Date of Visit: 11/27/2023       Chief Complaint:  No chief complaint on file. Oncologic History:  Zoila Vidal is a 80year old female referred by Dr. Garcia Oneill for evaluation of her chronic anemia. The patient reports that she has been anemic for quite some time. She has a long history of weakness and pain in the BLE. She reports that she has trouble, at times, rising from a seated position. She also reports that the joints of her fingers swell intermittently. She was seen by Rheumatology, Dr. Wojciech Oconnell in the past and was diagnosed with Polymyalgia rheumatica. She was treated with steroids and then MTX. She reports that these medications made her feel \"bad all over\". She barnard in [place] and was seen by a rheumatologist there and told that she did not have PMR. The medications were discontinued. She has had frequent elevations of her ESR, ranging from  in our system, but reports a normal ESR in [place]. Consultation at the [facility] clinic was suggested, but the referral was declined. She was seen by [facility] hematology in 2015. The etiology of her anemia, at that time, was not clear. She had an elevated ferritin and was screened for hemochromatosis, but carries only one gene, which does not typically cause clinical hemochromatosis. Observation was recommended and her hgb remained stable. She was then seen by hematology in [place], and numerous blood tests, including an SPEP, B12, Folate, iron studies, Retic ct and haptoglobin were reportedly unrevealing. 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 No F/C/NS. No palpable LAD. No wt loss. No family history of anemia.     She was seen for an evaluation at the [facility]. The hematologic diagnosis of anemia of chronic illness was confirmed. They agreed with not starting supplemental BHASKAR, as her hgb was 9.5 there.       History of Present Illness:  Pt is here for follow up. ***    Performance Status:  ECOG 1    Past Medical History:  Past Medical History:   Diagnosis Date    ANEMIA     Anemia     Anemia, unspecified 10/13/2020    Anorexia 10/13/2020    Anxiety     Arthritis     Back pain     Benign neoplasm of colon 11/8/2007    Diarrhea, unspecified     Eye disease     Fatigue     Heart palpitations     History of cardiac murmur     History of depression During Covid    Lung nodule 9/13/2012    Seen on [facility]     Normal cardiac stress test 9/12    at [facility]    Other and unspecified hyperlipidemia     Other seborrheic keratosis 10/8/2020    Pain in joints     Palpitations 8/10/2020    PMR (polymyalgia rheumatica) (HCC)     Sicca syndrome ([address]     Skin lesion 6/19/2019    Sputum production     Stress     Unspecified essential hypertension     Wears glasses     Weight loss        Past Surgical History:  Past Surgical History:   Procedure Laterality Date    COLONOSCOPY  10/5/06    rectal hyperplastic polyp, diverticulosis, hemorrhoids    COLONOSCOPY,DIAGNOSTIC  11/29/12    sigmoid polyp, diverticulosis    OTHER SURGICAL HISTORY      PAROTID DUCT DIVERT,EXC SUBMAND         Family History:  Family History   Problem Relation Age of Onset    Cancer Father 61        colon @61    Colon Cancer Father         77    Other (Other) Mother         osteoporosis    Other (Other) Sister         osteoporosis    Heart Disorder Maternal Grandmother         MI       Social History:  Social History     Socioeconomic History    Marital status:      Spouse name: Not on file    Number of children: Not on file    Years of education: Not on file    Highest education level: Not on file   Occupational History    Not on file   Tobacco Use    Smoking status: Never    Smokeless tobacco: Never   Vaping Use    Vaping Use: Never used   Substance and Sexual Activity    Alcohol use: No     Alcohol/week: 0.0 standard drinks of alcohol    Drug use: No    Sexual activity: Not Currently   Other Topics Concern     Service Not Asked    Blood Transfusions Not Asked    Caffeine Concern Yes     Comment: 0-1 cups daily    Occupational Exposure Not Asked    Hobby Hazards Not Asked    Sleep Concern Not Asked    Stress Concern Not Asked    Weight Concern Not Asked    Special Diet Not Asked    Back Care Not Asked    Exercise No    Bike Helmet Not Asked    Seat Belt Not Asked    Self-Exams Not Asked   Social History Narrative    : 1966    Children: 2, misscarriage times 2    Exercise: walks    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[employer]     Caffeine intake: 1-2 [place] coffee/d             Social Determinants of Health     Financial Resource Strain: Not on file   Food Insecurity: Not on file   Transportation Needs: Not on file   Physical Activity: Not on file   Stress: Not on file   Social Connections: Not on file   Housing Stability: Not on file       Current Medications:    Current Outpatient Medications:     rosuvastatin 5 MG Oral Tab, Take 1 tablet (5 mg total) by mouth daily. Decrease in dose, Disp: , Rfl:     METOPROLOL SUCCINATE ER 25 MG Oral Tablet 24 Hr, TAKE 1 TABLET DAILY, Disp: 90 tablet, Rfl: 3    Irbesartan 300 MG Oral Tab, Take 1 tablet (300 mg total) by mouth nightly., Disp: 90 tablet, Rfl: 3    rosuvastatin 10 MG Oral Tab, Take 1 tablet (10 mg total) by mouth daily. , Disp: 90 tablet, Rfl: 3    aspirin 81 MG Oral Tab EC, Take 1 tablet (81 mg total) by mouth daily. , Disp: , Rfl: 0    magnesium 30 MG Oral Tab, Take 1 tablet (30 mg total) by mouth twice a week., Disp: , Rfl:     Ascorbic Acid (VITAMIN C) 1000 MG Oral Tab, Take 1 tablet (1,000 mg total) by mouth daily. , Disp: , Rfl:     vitamin B-12 50 MCG Oral Tab, Take 1 tablet (50 mcg total) by mouth daily. , Disp: , Rfl: Multiple Vitamins-Minerals (OCUVITE EXTRA) Oral Tab, Take 2 tablets by mouth daily. , Disp: , Rfl:     acetaminophen 325 MG Oral Tab, Take 1 tablet (325 mg total) by mouth every 6 (six) hours as needed for Pain., Disp: , Rfl:     Cholecalciferol (VITAMIN D) 1000 UNIT Oral Cap, Take 5,000 Units by mouth. Patient's daughter reports patient is taking Vitamin D 2000 units every other day. , Disp: , Rfl:     CENTRUM SILVER OR TABS, every other day, Disp: , Rfl:     Allergies:  No Known Allergies     Review of Systems:    Constitutional No fevers, chills, night sweats, excessive fatigue or weight loss. Eyes No significant visual difficulties. No diplopia. No yellowing. Hematologic/Lymphatic Normal - No easy bruising or bleeding. No tender or palpable lymph nodes. Respiratory No dyspnea, pleuritic chest pain, cough or hemoptysis. Cardiovascular No anginal chest pain, palpitations or orthopnea. Gastrointestinal No nausea, vomiting, diarrhea, GI bleeding, or constipation. NL appetite. Genitorurinary  No hematuria, dysuria, abnormal bleeding, or incontinence. Integumentary No rashes or yellowing of the skin   Neurologic No headache, blurred vision, and no areas of focal weakness. Normal gait. Psychiatric No insomnia, depression, martin or mood swings. Vital Signs:  LMP  (LMP Unknown)     Physical Examination:    Constitutional Normal - Mood and affect appropriate. Appears close to chronological age. Well nourished. Well developed. Eyes Normal - Conjunctivae and sclerae are clear and without icterus. Pupils are reactive and equal.   Hematologic/Lymphatic Normal - No petechiae or purpura. No tender or palpable lymph nodes in the cervical, supraclavicular, axillary or inguinal area. Respiratory Normal - Lungs are clear to auscultation, no wheezing. Cardiovascular Normal - Regular rate and rhythm, no murmurs. Abdomen Normal - Non-tender, non-distended, no masses, ascites or hepatosplenomegaly. Extremities Normal - No cyanosis, clubbing or edema. Integumentary Normal - No rashes and noJaundice   Neurologic Normal - No sensory or motor deficits, normal cerebellar function, normal gait, cranial nerves intact. Psychiatric Normal - A&Ox3. Coherent speech. Verbalizes understanding of our discussions today. Lab:    Radiology:    Pathology:    Impression and Plan:  Anemia of chronic illness: With steroid treatment, her hgb returned to 10-10.5, which has been her baseline for the past few years. 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 Her hgb drifted down to the point where she would benefit from Retacrit to improve her quality of life. Her iron stores were replete. She is tolerating 30,000 units q2 weeks with a hgb 9.5-10.5. Continue the current schedule. Iron stores are replete. Polymyalgia rheumatica and Sjogrens: Management per rheumatology. She has been weaned from her steroids    Planned Follow Up:  3 months    Electronically Signed by: [facility] due to steroid myopathy. Her hgb drifted down to the point where she would benefit from Retacrit to improve her quality of life. Her iron stores were replete. She is tolerating 30,000 units q2 weeks with a hgb 9.5-10.5. Continue the current schedule. Iron stores are replete. We discussed that if she is able to skip a shot for hgb>10, we will repeat in 1 week rather than 2. Polymyalgia rheumatica and Sjogrens: Management per rheumatology. She has been weaned from her steroids    Planned Follow Up:  3 months    Electronically Signed by: Audelia Blair M.D.   [facility]

## 2023-12-11 ENCOUNTER — APPOINTMENT (OUTPATIENT)
Dept: HEMATOLOGY/ONCOLOGY | Facility: HOSPITAL | Age: 83
End: 2023-12-11
Attending: INTERNAL MEDICINE
Payer: MEDICARE

## 2023-12-12 ENCOUNTER — OFFICE VISIT (OUTPATIENT)
Dept: HEMATOLOGY/ONCOLOGY | Facility: HOSPITAL | Age: 83
End: 2023-12-12
Attending: INTERNAL MEDICINE
Payer: MEDICARE

## 2023-12-12 VITALS
TEMPERATURE: 98 F | HEIGHT: 64.02 IN | OXYGEN SATURATION: 96 % | SYSTOLIC BLOOD PRESSURE: 157 MMHG | WEIGHT: 143.19 LBS | HEART RATE: 69 BPM | RESPIRATION RATE: 16 BRPM | DIASTOLIC BLOOD PRESSURE: 67 MMHG | BODY MASS INDEX: 24.45 KG/M2

## 2023-12-12 DIAGNOSIS — D63.8 ANEMIA OF CHRONIC DISEASE: Primary | ICD-10-CM

## 2023-12-12 LAB — HGB BLD-MCNC: 8.7 G/DL

## 2023-12-12 PROCEDURE — 36415 COLL VENOUS BLD VENIPUNCTURE: CPT

## 2023-12-12 PROCEDURE — 85018 HEMOGLOBIN: CPT

## 2023-12-12 PROCEDURE — 96372 THER/PROPH/DIAG INJ SC/IM: CPT

## 2023-12-12 NOTE — PROGRESS NOTES
Pt arrived amb for Retacrit inj. HGB met criteria for administration. Given per MD order. Pt rolan well. Left amb without c/o.

## 2023-12-18 ENCOUNTER — TELEPHONE (OUTPATIENT)
Dept: INTERNAL MEDICINE CLINIC | Facility: CLINIC | Age: 83
End: 2023-12-18

## 2023-12-18 DIAGNOSIS — R16.0 LIVER MASS: Primary | ICD-10-CM

## 2023-12-18 NOTE — TELEPHONE ENCOUNTER
Pt has severe shoulder pain that started weds night. She stated its getting a little better. Nothing available this week for a 40min appt she doesn't want to wait till next week

## 2023-12-18 NOTE — TELEPHONE ENCOUNTER
Called and spoke to pt. Pt said she was having bad shoulder pain last week, improved today. Offered appt Wednesday with CB, pt declined and said she is only wanting to see a doctor. Informed her no appts with week with any physician. Pt stated, \"then what am I supposed to do with this pain\". Explained to pt when there are more acute issues and no availably with physicians, midlevels will help with this. Pt still declining to see YVONNE. Informed her another option is going to  or can schedule appt next week with physician. Pt stated next week is too long. She then stated thank you and call was ended.     DEENA MORFIN

## 2023-12-26 ENCOUNTER — OFFICE VISIT (OUTPATIENT)
Dept: HEMATOLOGY/ONCOLOGY | Facility: HOSPITAL | Age: 83
End: 2023-12-26
Attending: INTERNAL MEDICINE
Payer: MEDICARE

## 2023-12-26 VITALS
BODY MASS INDEX: 24.21 KG/M2 | TEMPERATURE: 97 F | SYSTOLIC BLOOD PRESSURE: 154 MMHG | HEART RATE: 61 BPM | WEIGHT: 141.81 LBS | DIASTOLIC BLOOD PRESSURE: 63 MMHG | HEIGHT: 64.02 IN | OXYGEN SATURATION: 98 %

## 2023-12-26 DIAGNOSIS — D63.8 ANEMIA OF CHRONIC DISEASE: Primary | ICD-10-CM

## 2023-12-26 LAB — HGB BLD-MCNC: 9.8 G/DL

## 2023-12-26 PROCEDURE — 36415 COLL VENOUS BLD VENIPUNCTURE: CPT

## 2023-12-26 PROCEDURE — 85018 HEMOGLOBIN: CPT

## 2023-12-26 PROCEDURE — 96372 THER/PROPH/DIAG INJ SC/IM: CPT

## 2024-01-08 ENCOUNTER — APPOINTMENT (OUTPATIENT)
Dept: HEMATOLOGY/ONCOLOGY | Facility: HOSPITAL | Age: 84
End: 2024-01-08
Attending: INTERNAL MEDICINE
Payer: MEDICARE

## 2024-01-09 ENCOUNTER — OFFICE VISIT (OUTPATIENT)
Dept: HEMATOLOGY/ONCOLOGY | Facility: HOSPITAL | Age: 84
End: 2024-01-09
Attending: INTERNAL MEDICINE
Payer: MEDICARE

## 2024-01-09 VITALS
HEART RATE: 85 BPM | RESPIRATION RATE: 16 BRPM | TEMPERATURE: 98 F | SYSTOLIC BLOOD PRESSURE: 155 MMHG | OXYGEN SATURATION: 96 % | DIASTOLIC BLOOD PRESSURE: 61 MMHG

## 2024-01-09 DIAGNOSIS — D63.8 ANEMIA OF CHRONIC DISEASE: Primary | ICD-10-CM

## 2024-01-09 LAB — HGB BLD-MCNC: 9.6 G/DL

## 2024-01-09 PROCEDURE — 96372 THER/PROPH/DIAG INJ SC/IM: CPT

## 2024-01-09 PROCEDURE — 85018 HEMOGLOBIN: CPT

## 2024-01-09 PROCEDURE — 36415 COLL VENOUS BLD VENIPUNCTURE: CPT

## 2024-01-09 NOTE — PROGRESS NOTES
Education Record    Learner:  Patient    Disease / Diagnosis: anemia    Barriers / Limitations:  None   Comments:    Method:  Brief focused and Discussion   Comments:    General Topics:  Medication, Plan of care reviewed, and Fall risk and prevention   Comments:    Outcome:  Shows understanding   Comments:

## 2024-01-22 ENCOUNTER — OFFICE VISIT (OUTPATIENT)
Dept: HEMATOLOGY/ONCOLOGY | Facility: HOSPITAL | Age: 84
End: 2024-01-22
Attending: INTERNAL MEDICINE
Payer: MEDICARE

## 2024-01-22 VITALS
OXYGEN SATURATION: 96 % | HEART RATE: 72 BPM | DIASTOLIC BLOOD PRESSURE: 71 MMHG | SYSTOLIC BLOOD PRESSURE: 134 MMHG | TEMPERATURE: 97 F | RESPIRATION RATE: 16 BRPM

## 2024-01-22 DIAGNOSIS — D63.8 ANEMIA OF CHRONIC DISEASE: Primary | ICD-10-CM

## 2024-01-22 LAB — HGB BLD-MCNC: 9.4 G/DL

## 2024-01-22 PROCEDURE — 96372 THER/PROPH/DIAG INJ SC/IM: CPT

## 2024-01-22 PROCEDURE — 36415 COLL VENOUS BLD VENIPUNCTURE: CPT

## 2024-01-22 PROCEDURE — 85018 HEMOGLOBIN: CPT

## 2024-01-31 ENCOUNTER — TELEPHONE (OUTPATIENT)
Dept: INTERNAL MEDICINE CLINIC | Facility: CLINIC | Age: 84
End: 2024-01-31

## 2024-01-31 DIAGNOSIS — Z00.00 ROUTINE GENERAL MEDICAL EXAMINATION AT A HEALTH CARE FACILITY: Primary | ICD-10-CM

## 2024-01-31 DIAGNOSIS — I10 PRIMARY HYPERTENSION: ICD-10-CM

## 2024-01-31 DIAGNOSIS — E78.2 MIXED HYPERLIPIDEMIA: ICD-10-CM

## 2024-02-05 ENCOUNTER — OFFICE VISIT (OUTPATIENT)
Dept: HEMATOLOGY/ONCOLOGY | Facility: HOSPITAL | Age: 84
End: 2024-02-05
Attending: INTERNAL MEDICINE
Payer: MEDICARE

## 2024-02-05 ENCOUNTER — HOSPITAL ENCOUNTER (OUTPATIENT)
Dept: MAMMOGRAPHY | Age: 84
Discharge: HOME | End: 2024-02-05
Attending: INTERNAL MEDICINE
Payer: MEDICARE

## 2024-02-05 VITALS
WEIGHT: 142 LBS | HEART RATE: 71 BPM | RESPIRATION RATE: 18 BRPM | BODY MASS INDEX: 24.24 KG/M2 | OXYGEN SATURATION: 96 % | TEMPERATURE: 97 F | SYSTOLIC BLOOD PRESSURE: 163 MMHG | DIASTOLIC BLOOD PRESSURE: 82 MMHG | HEIGHT: 64.02 IN

## 2024-02-05 DIAGNOSIS — D63.8 ANEMIA OF CHRONIC DISEASE: Primary | ICD-10-CM

## 2024-02-05 DIAGNOSIS — Z12.31 BREAST CANCER SCREENING BY MAMMOGRAM: ICD-10-CM

## 2024-02-05 LAB
BASOPHILS # BLD AUTO: 0.05 X10(3) UL (ref 0–0.2)
BASOPHILS NFR BLD AUTO: 0.6 %
DEPRECATED HBV CORE AB SER IA-ACNC: 511.5 NG/ML
EOSINOPHIL # BLD AUTO: 0.64 X10(3) UL (ref 0–0.7)
EOSINOPHIL NFR BLD AUTO: 8.2 %
ERYTHROCYTE [DISTWIDTH] IN BLOOD BY AUTOMATED COUNT: 24.2 %
HCT VFR BLD AUTO: 31.6 %
HGB BLD-MCNC: 10.1 G/DL
IMM GRANULOCYTES # BLD AUTO: 0.02 X10(3) UL (ref 0–1)
IMM GRANULOCYTES NFR BLD: 0.3 %
IRON SATN MFR SERPL: 38 %
IRON SERPL-MCNC: 162 UG/DL
LYMPHOCYTES # BLD AUTO: 3.11 X10(3) UL (ref 1–4)
LYMPHOCYTES NFR BLD AUTO: 40.1 %
MCH RBC QN AUTO: 32.8 PG (ref 26–34)
MCHC RBC AUTO-ENTMCNC: 32 G/DL (ref 31–37)
MCV RBC AUTO: 102.6 FL
MONOCYTES # BLD AUTO: 0.61 X10(3) UL (ref 0.1–1)
MONOCYTES NFR BLD AUTO: 7.9 %
NEUTROPHILS # BLD AUTO: 3.33 X10 (3) UL (ref 1.5–7.7)
NEUTROPHILS # BLD AUTO: 3.33 X10(3) UL (ref 1.5–7.7)
NEUTROPHILS NFR BLD AUTO: 42.9 %
PLATELET # BLD AUTO: 284 10(3)UL (ref 150–450)
PLATELET MORPHOLOGY: NORMAL
RBC # BLD AUTO: 3.08 X10(6)UL
TIBC SERPL-MCNC: 432 UG/DL (ref 240–450)
TRANSFERRIN SERPL-MCNC: 290 MG/DL (ref 200–360)
WBC # BLD AUTO: 7.8 X10(3) UL (ref 4–11)

## 2024-02-05 PROCEDURE — 77067 SCR MAMMO BI INCL CAD: CPT | Performed by: INTERNAL MEDICINE

## 2024-02-05 PROCEDURE — 99214 OFFICE O/P EST MOD 30 MIN: CPT | Performed by: INTERNAL MEDICINE

## 2024-02-05 PROCEDURE — 77063 BREAST TOMOSYNTHESIS BI: CPT | Performed by: INTERNAL MEDICINE

## 2024-02-05 NOTE — PROGRESS NOTES
Pt here for follow up and injection.  She complains of fatigue.    Outpatient Oncology Care Plan  Problem list:  knowledge deficit    Problems related to:    disease/disease progression    Interventions:  provided general teaching    Expected outcomes:  understands plan of care    Progress towards outcome:  making progress    Education Record    Learner:  Patient  Barriers / Limitations:  None  Method:  Brief focused  Outcome:  Shows understanding  Comments:

## 2024-02-05 NOTE — PROGRESS NOTES
Cancer Center Progress Note  Patient Name: Karolina Contreras   YOB: 1940   Medical Record Number: [id number]   CSN: [id number]   Attending Physician: Marco A Wilcox M.D.       Date of Visit: 2/5/2024         Chief Complaint:  Chief Complaint   Patient presents with    Follow - Up        Oncologic History:  Karolina Contreras is a 83 year old female referred by Dr. Chowdhury for evaluation of her chronic anemia.  The patient reports that she has been anemic for quite some time. She has a long history of weakness and pain in the BLE.  She reports that she has trouble, at times, rising from a seated position.  She also reports that the joints of her fingers swell intermittently. She was seen by Rheumatology, Dr. Braun in the past and was diagnosed with Polymyalgia rheumatica.  She was treated with steroids and then MTX.  She reports that these medications made her feel \"bad all over\". She barnard in [place] and was seen by a rheumatologist there and told that she did not have PMR.  The medications were discontinued. She has had frequent elevations of her ESR, ranging from  in our system, but reports a normal ESR in [place]. Consultation at the [facility] [place] [facility] was suggested, but the referral was declined.  She was seen by [facility] hematology in 2015.  The etiology of her anemia, at that time, was not clear. She had an elevated ferritin and was screened for hemochromatosis, but carries only one gene, which does not typically cause clinical hemochromatosis.  Observation was recommended and her hgb remained stable.  She was then seen by hematology in [place], and numerous blood tests, including an SPEP, B12, Folate, iron studies, Retic ct and haptoglobin were reportedly unrevealing.  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       No F/C/NS. No palpable LAD. No wt loss.      No  family history of anemia.    She was seen for an evaluation at the [facility]. The hematologic diagnosis of anemia of chronic illness was confirmed. They agreed with not starting supplemental BHASKAR, as her hgb was 9.5 there.      History of Present Illness:  Pt is here for follow up. She continues to complain of mild fatigue. No F/C/NS,     Performance Status:  ECOG 1    Past Medical History:  Past Medical History:   Diagnosis Date    ANEMIA     Anemia     Anemia, unspecified 10/13/2020    Anorexia 10/13/2020    Anxiety     Arthritis     Back pain     Benign neoplasm of colon 11/8/2007    Diarrhea, unspecified     Eye disease     Fatigue     Heart palpitations     History of cardiac murmur     History of depression During Covid    Lung nodule 9/13/2012    Seen on [facility]     Normal cardiac stress test 9/12    at [facility]    Other and unspecified hyperlipidemia     Other seborrheic keratosis 10/8/2020    Pain in joints     Palpitations 8/10/2020    PMR (polymyalgia rheumatica) (HCC)     Sicca syndrome (HCC)     Skin lesion 6/19/2019    Sputum production     Stress     Unspecified essential hypertension     Wears glasses     Weight loss        Past Surgical History:  Past Surgical History:   Procedure Laterality Date    COLONOSCOPY  10/5/06    rectal hyperplastic polyp, diverticulosis, hemorrhoids    COLONOSCOPY,DIAGNOSTIC  11/29/12    sigmoid polyp, diverticulosis    OTHER SURGICAL HISTORY      PAROTID DUCT DIVERT,EXC SUBMAND         Family History:  Family History   Problem Relation Age of Onset    Cancer Father 63        colon @63    Colon Cancer Father         66    Other (Other) Mother         osteoporosis    Other (Other) Sister         osteoporosis    Heart Disorder Maternal Grandmother         MI       Social History:  Social History     Socioeconomic History    Marital status:      Spouse name: Not on file    Number of children: Not on file    Years of education: Not on file    Highest education level:  Not on file   Occupational History    Not on file   Tobacco Use    Smoking status: Never    Smokeless tobacco: Never   Vaping Use    Vaping Use: Never used   Substance and Sexual Activity    Alcohol use: No     Alcohol/week: 0.0 standard drinks of alcohol    Drug use: No    Sexual activity: Not Currently   Other Topics Concern     Service Not Asked    Blood Transfusions Not Asked    Caffeine Concern Yes     Comment: 0-1 cups daily    Occupational Exposure Not Asked    Hobby Hazards Not Asked    Sleep Concern Not Asked    Stress Concern Not Asked    Weight Concern Not Asked    Special Diet Not Asked    Back Care Not Asked    Exercise No    Bike Helmet Not Asked    Seat Belt Not Asked    Self-Exams Not Asked   Social History Narrative    : 1966    Children: 2, misscarriage times 2    Exercise: walks    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[employer]     Caffeine intake: 1-2 Greek coffee/d             Social Determinants of Health     Financial Resource Strain: Not on file   Food Insecurity: Not on file   Transportation Needs: Not on file   Physical Activity: Not on file   Stress: Not on file   Social Connections: Not on file   Housing Stability: Not on file       Current Medications:    Current Outpatient Medications:     METOPROLOL SUCCINATE ER 25 MG Oral Tablet 24 Hr, TAKE 1 TABLET DAILY, Disp: 90 tablet, Rfl: 3    Irbesartan 300 MG Oral Tab, Take 1 tablet (300 mg total) by mouth nightly., Disp: 90 tablet, Rfl: 3    rosuvastatin 10 MG Oral Tab, Take 1 tablet (10 mg total) by mouth daily., Disp: 90 tablet, Rfl: 3    aspirin 81 MG Oral Tab EC, Take 1 tablet (81 mg total) by mouth daily., Disp: , Rfl: 0    magnesium 30 MG Oral Tab, Take 1 tablet (30 mg total) by mouth twice a week., Disp: , Rfl:     Ascorbic Acid (VITAMIN C) 1000 MG Oral Tab, Take 1 tablet (1,000 mg total) by mouth daily., Disp: , Rfl:     vitamin B-12 50 MCG Oral Tab, Take 1 tablet (50 mcg total) by mouth daily., Disp: , Rfl:     Multiple Vitamins-Minerals  (OCUVITE EXTRA) Oral Tab, Take 2 tablets by mouth daily., Disp: , Rfl:     acetaminophen 325 MG Oral Tab, Take 1 tablet (325 mg total) by mouth every 6 (six) hours as needed for Pain., Disp: , Rfl:     Cholecalciferol (VITAMIN D) 1000 UNIT Oral Cap, Take 5,000 Units by mouth. Patient's daughter reports patient is taking Vitamin D 2000 units every other day. , Disp: , Rfl:     CENTRUM SILVER OR TABS, every other day, Disp: , Rfl:     Allergies:  No Known Allergies     Review of Systems:    Constitutional No fevers, chills, night sweats, excessive fatigue or weight loss.   Eyes No significant visual difficulties. No diplopia. No yellowing.   Hematologic/Lymphatic Normal - No easy bruising or bleeding.  No tender or palpable lymph nodes.   Respiratory No dyspnea, pleuritic chest pain, cough or hemoptysis.   Cardiovascular No anginal chest pain, palpitations or orthopnea.   Gastrointestinal No nausea, vomiting, diarrhea, GI bleeding, or constipation. NL appetite.   Genitorurinary  No hematuria, dysuria, abnormal bleeding, or incontinence.   Integumentary No rashes or yellowing of the skin   Neurologic No headache, blurred vision, and no areas of focal weakness. Normal gait.   Psychiatric No insomnia, depression, martin or mood swings.         Vital Signs:  BP (!) 163/82 (BP Location: Left arm, Patient Position: Sitting, Cuff Size: adult)   Pulse 71   Temp 97.2 °F (36.2 °C) (Temporal)   Resp 18   Ht 1.626 m (5' 4.02\")   Wt 64.4 kg (142 lb)   LMP  (LMP Unknown)   SpO2 96%   BMI 24.36 kg/m²     Physical Examination:    Constitutional Normal - Mood and affect appropriate. Appears close to chronological age. Well nourished. Well developed.   Eyes Normal - Conjunctivae and sclerae are clear and without icterus. Pupils are reactive and equal.   Hematologic/Lymphatic Normal - No petechiae or purpura.  No tender or palpable lymph nodes in the cervical, supraclavicular, axillary or inguinal area.   Respiratory Normal -  Lungs are clear to auscultation, no wheezing.   Cardiovascular Normal - Regular rate and rhythm, no murmurs.   Abdomen Normal - Non-tender, non-distended, no masses, ascites or hepatosplenomegaly.    Extremities Normal - No cyanosis, clubbing or edema.    Integumentary Normal - No rashes and noJaundice   Neurologic Normal - No sensory or motor deficits, normal cerebellar function, normal gait, cranial nerves intact.   Psychiatric Normal - A&Ox3. Coherent speech. Verbalizes understanding of our discussions today.       Lab:  CBC, CMP, Iron studies ordered and pending    Radiology:    Pathology:    Impression and Plan:  Anemia of chronic illness: With steroid treatment, her hgb returned to 10-10.5, which has been her baseline for the past few years. 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 Her hgb drifted down to the point where she would benefit from Retacrit to improve her quality of life. Her iron stores were replete. She is tolerating 30,000 units q2 weeks with a hgb 9.5-10.5. Continue the current schedule. Iron stores are replete.        Polymyalgia rheumatica and Sjogrens: Management per rheumatology. She has been weaned from her steroids    Planned Follow Up:  3 months    Electronically Signed by:    Marco A Wilcox M.D.  [facility]

## 2024-02-05 NOTE — PROGRESS NOTES
Education Record    Learner:  Patient    Disease / Diagnosis:Anemia of chronic disease      Barriers / Limitations:  None   Comments:    Method:  Brief focused   Comments:    General Topics:  Diet, Infection, Medication, Pain, Precautions, Procedure, Side effects and symptom management, Plan of care reviewed, and Fall risk and prevention   Comments:    Outcome:  Shows understanding   Comments:    Hg=10.1 today , no injection . She will come back next week per Gladys RN ( because pt will most likely need injection )COntinue with PL+possible injections q 2 weeks after next week and FU with MD in 3 months from today

## 2024-02-09 ENCOUNTER — OFFICE VISIT (OUTPATIENT)
Dept: INTERNAL MEDICINE CLINIC | Facility: CLINIC | Age: 84
End: 2024-02-09
Payer: MEDICARE

## 2024-02-09 VITALS
HEIGHT: 64.02 IN | WEIGHT: 142.19 LBS | RESPIRATION RATE: 18 BRPM | BODY MASS INDEX: 24.28 KG/M2 | SYSTOLIC BLOOD PRESSURE: 130 MMHG | OXYGEN SATURATION: 96 % | DIASTOLIC BLOOD PRESSURE: 80 MMHG | TEMPERATURE: 98 F | HEART RATE: 75 BPM

## 2024-02-09 DIAGNOSIS — Z00.00 ROUTINE GENERAL MEDICAL EXAMINATION AT A HEALTH CARE FACILITY: Primary | ICD-10-CM

## 2024-02-09 DIAGNOSIS — D89.89 AUTOIMMUNE DISORDER (HCC): ICD-10-CM

## 2024-02-09 DIAGNOSIS — F41.9 ANXIETY: ICD-10-CM

## 2024-02-09 DIAGNOSIS — R53.83 FATIGUE, UNSPECIFIED TYPE: ICD-10-CM

## 2024-02-09 DIAGNOSIS — M79.10 MYALGIA: ICD-10-CM

## 2024-02-09 DIAGNOSIS — M35.00 SJOGREN'S SYNDROME WITHOUT EXTRAGLANDULAR INVOLVEMENT (HCC): ICD-10-CM

## 2024-02-09 DIAGNOSIS — D64.9 ANEMIA, UNSPECIFIED TYPE: ICD-10-CM

## 2024-02-09 DIAGNOSIS — I10 PRIMARY HYPERTENSION: ICD-10-CM

## 2024-02-09 DIAGNOSIS — M35.00 SICCA SYNDROME (HCC): ICD-10-CM

## 2024-02-09 DIAGNOSIS — Z78.0 POST-MENOPAUSAL: ICD-10-CM

## 2024-02-09 DIAGNOSIS — E78.2 MIXED HYPERLIPIDEMIA: ICD-10-CM

## 2024-02-09 PROCEDURE — 99214 OFFICE O/P EST MOD 30 MIN: CPT | Performed by: INTERNAL MEDICINE

## 2024-02-09 PROCEDURE — 96160 PT-FOCUSED HLTH RISK ASSMT: CPT | Performed by: INTERNAL MEDICINE

## 2024-02-09 PROCEDURE — G0439 PPPS, SUBSEQ VISIT: HCPCS | Performed by: INTERNAL MEDICINE

## 2024-02-09 RX ORDER — LIFITEGRAST 50 MG/ML
SOLUTION/ DROPS OPHTHALMIC
COMMUNITY
Start: 2024-01-09

## 2024-02-12 ENCOUNTER — APPOINTMENT (OUTPATIENT)
Dept: HEMATOLOGY/ONCOLOGY | Facility: HOSPITAL | Age: 84
End: 2024-02-12
Attending: INTERNAL MEDICINE
Payer: MEDICARE

## 2024-02-12 NOTE — PROGRESS NOTES
Subjective:   Patient ID: Karolina Contreras is a 83 year old female.    /80   Pulse 75   Temp 97.8 °F (36.6 °C) (Temporal)   Resp 18   Ht 5' 4.02\" (1.626 m)   Wt 142 lb 3.2 oz (64.5 kg)   LMP  (LMP Unknown)   SpO2 96%   BMI 24.39 kg/m²     HPI  Here for awv, see form filled out for detials, htn, hyperchol, some myalgias, chronic anemia, sees heme, autoimune dx follows with rheum, anxiety pt states ok  History/Other:   Review of Systems   Constitutional:  Negative for fever.   HENT:  Negative for congestion.    Eyes:  Negative for visual disturbance.   Respiratory:  Negative for shortness of breath.    Cardiovascular:  Negative for chest pain.   Gastrointestinal:  Negative for abdominal pain.   Endocrine: Negative for polyuria.   Genitourinary:  Negative for dysuria.   Musculoskeletal:  Negative for arthralgias.   Skin:  Negative for rash.   Neurological:  Negative for headaches.   Psychiatric/Behavioral:  Negative for confusion.      Current Outpatient Medications   Medication Sig Dispense Refill    Lifitegrast (XIIDRA) 5 % Ophthalmic Solution one drop both eyes Ophthalmic Twice a day for 90 days      METOPROLOL SUCCINATE ER 25 MG Oral Tablet 24 Hr TAKE 1 TABLET DAILY 90 tablet 3    Irbesartan 300 MG Oral Tab Take 1 tablet (300 mg total) by mouth nightly. 90 tablet 3    rosuvastatin 10 MG Oral Tab Take 1 tablet (10 mg total) by mouth daily. 90 tablet 3    aspirin 81 MG Oral Tab EC Take 1 tablet (81 mg total) by mouth daily.  0    magnesium 30 MG Oral Tab Take 1 tablet (30 mg total) by mouth twice a week.      Ascorbic Acid (VITAMIN C) 1000 MG Oral Tab Take 1 tablet (1,000 mg total) by mouth daily.      vitamin B-12 50 MCG Oral Tab Take 1 tablet (50 mcg total) by mouth daily.      Multiple Vitamins-Minerals (OCUVITE EXTRA) Oral Tab Take 2 tablets by mouth daily.      acetaminophen 325 MG Oral Tab Take 1 tablet (325 mg total) by mouth every 6 (six) hours as needed for Pain.      Cholecalciferol  (VITAMIN D) 1000 UNIT Oral Cap Take 5,000 Units by mouth. Patient's daughter reports patient is taking Vitamin D 2000 units every other day.       CENTRUM SILVER OR TABS every other day       Allergies:No Known Allergies    Objective:   Physical Exam  Constitutional:       Appearance: Normal appearance.   HENT:      Head: Normocephalic and atraumatic.      Right Ear: Tympanic membrane normal.      Left Ear: Tympanic membrane normal.   Eyes:      Pupils: Pupils are equal, round, and reactive to light.   Cardiovascular:      Rate and Rhythm: Regular rhythm.      Heart sounds: No murmur heard.  Pulmonary:      Breath sounds: Normal breath sounds.   Abdominal:      Palpations: Abdomen is soft.   Musculoskeletal:         General: No swelling.      Cervical back: Neck supple.   Skin:     General: Skin is warm.   Neurological:      General: No focal deficit present.      Mental Status: She is alert.   Psychiatric:         Mood and Affect: Mood normal.         Assessment & Plan:   1. Routine general medical examination at a health care facility -see awv form filled out for detials   2. Sjogren's syndrome without extraglandular involvement (HCC) -cont rheum fu   3. Sicca syndrome (HCC) -cont rheum fu   4. Autoimmune disorder (HCC) -cont rheum fu   5. Mixed hyperlipidemia -cont meds   6. Primary hypertension -cont meds   7. Anxiety -stable, follow   8. Fatigue, unspecified type -improving, follow   9. Myalgia -stop statin for 2 weeks and call with symptom update   10. Post-menopausal -dexa ordered, ho steriod use   11. Anemia, unspecified type -cont heme fu   Call in 2 weeks with myalgia update off statin, fu in 6 months    Orders Placed This Encounter   Procedures    Comp Metabolic Panel (14) [E]    Lipid Panel [E]    CK (Creatine Kinase) (Not Creatinine) (E)    TSH W Reflex To Free T4 [E]       Meds This Visit:  Requested Prescriptions      No prescriptions requested or ordered in this encounter       Imaging &  Referrals:  XR DEXA BONE DENSITOMETRY (CPT=[id number])

## 2024-02-13 ENCOUNTER — OFFICE VISIT (OUTPATIENT)
Dept: HEMATOLOGY/ONCOLOGY | Facility: HOSPITAL | Age: 84
End: 2024-02-13
Attending: INTERNAL MEDICINE
Payer: MEDICARE

## 2024-02-13 VITALS
SYSTOLIC BLOOD PRESSURE: 158 MMHG | HEART RATE: 82 BPM | RESPIRATION RATE: 20 BRPM | TEMPERATURE: 98 F | OXYGEN SATURATION: 99 % | DIASTOLIC BLOOD PRESSURE: 107 MMHG

## 2024-02-13 DIAGNOSIS — M79.10 MYALGIA: ICD-10-CM

## 2024-02-13 DIAGNOSIS — E78.2 MIXED HYPERLIPIDEMIA: ICD-10-CM

## 2024-02-13 DIAGNOSIS — D63.8 ANEMIA OF CHRONIC DISEASE: Primary | ICD-10-CM

## 2024-02-13 DIAGNOSIS — I10 PRIMARY HYPERTENSION: ICD-10-CM

## 2024-02-13 LAB
ALBUMIN SERPL-MCNC: 3.6 G/DL (ref 3.4–5)
ALBUMIN/GLOB SERPL: 1 {RATIO} (ref 1–2)
ALP LIVER SERPL-CCNC: 65 U/L
ALT SERPL-CCNC: 22 U/L
ANION GAP SERPL CALC-SCNC: 2 MMOL/L (ref 0–18)
AST SERPL-CCNC: 23 U/L (ref 15–37)
BILIRUB SERPL-MCNC: 0.9 MG/DL (ref 0.1–2)
BUN BLD-MCNC: 25 MG/DL (ref 9–23)
CALCIUM BLD-MCNC: 9.6 MG/DL (ref 8.5–10.1)
CHLORIDE SERPL-SCNC: 110 MMOL/L (ref 98–112)
CHOLEST SERPL-MCNC: 134 MG/DL (ref ?–200)
CK SERPL-CCNC: 44 U/L
CO2 SERPL-SCNC: 29 MMOL/L (ref 21–32)
CREAT BLD-MCNC: 0.72 MG/DL
EGFRCR SERPLBLD CKD-EPI 2021: 83 ML/MIN/1.73M2 (ref 60–?)
GLOBULIN PLAS-MCNC: 3.7 G/DL (ref 2.8–4.4)
GLUCOSE BLD-MCNC: 100 MG/DL (ref 70–99)
HDLC SERPL-MCNC: 76 MG/DL (ref 40–59)
HGB BLD-MCNC: 9.4 G/DL
LDLC SERPL CALC-MCNC: 48 MG/DL (ref ?–100)
NONHDLC SERPL-MCNC: 58 MG/DL (ref ?–130)
OSMOLALITY SERPL CALC.SUM OF ELEC: 296 MOSM/KG (ref 275–295)
POTASSIUM SERPL-SCNC: 4.2 MMOL/L (ref 3.5–5.1)
PROT SERPL-MCNC: 7.3 G/DL (ref 6.4–8.2)
SODIUM SERPL-SCNC: 141 MMOL/L (ref 136–145)
TRIGL SERPL-MCNC: 40 MG/DL (ref 30–149)
TSI SER-ACNC: 1.58 MIU/ML (ref 0.36–3.74)
VLDLC SERPL CALC-MCNC: 6 MG/DL (ref 0–30)

## 2024-02-13 PROCEDURE — 96372 THER/PROPH/DIAG INJ SC/IM: CPT

## 2024-02-13 PROCEDURE — 80061 LIPID PANEL: CPT

## 2024-02-13 PROCEDURE — 84443 ASSAY THYROID STIM HORMONE: CPT

## 2024-02-13 PROCEDURE — 82550 ASSAY OF CK (CPK): CPT

## 2024-02-13 PROCEDURE — 80053 COMPREHEN METABOLIC PANEL: CPT

## 2024-02-13 PROCEDURE — 36415 COLL VENOUS BLD VENIPUNCTURE: CPT

## 2024-02-13 PROCEDURE — 85018 HEMOGLOBIN: CPT

## 2024-02-13 NOTE — PROGRESS NOTES
Education Record    Learner:  Patient    Disease / Diagnosis: anemia - retacrit inj    Barriers / Limitations:  None   Comments:    Method:  Brief focused   Comments:    General Topics:  Plan of care reviewed   Comments:    Outcome:  Shows understanding   Comments:    Received inj for hgb 9.4, tolerated without issue. Patient has her future appts, next one in 2 weeks.

## 2024-02-19 ENCOUNTER — APPOINTMENT (OUTPATIENT)
Dept: HEMATOLOGY/ONCOLOGY | Facility: HOSPITAL | Age: 84
End: 2024-02-19
Attending: INTERNAL MEDICINE
Payer: MEDICARE

## 2024-02-19 ENCOUNTER — HOSPITAL ENCOUNTER (OUTPATIENT)
Dept: ULTRASOUND IMAGING | Age: 84
Discharge: HOME | End: 2024-02-19
Attending: INTERNAL MEDICINE
Payer: MEDICARE

## 2024-02-19 DIAGNOSIS — D18.03 HEPATIC HEMANGIOMA: ICD-10-CM

## 2024-02-19 PROCEDURE — 76700 US EXAM ABDOM COMPLETE: CPT | Performed by: INTERNAL MEDICINE

## 2024-02-26 ENCOUNTER — OFFICE VISIT (OUTPATIENT)
Dept: HEMATOLOGY/ONCOLOGY | Facility: HOSPITAL | Age: 84
End: 2024-02-26
Attending: INTERNAL MEDICINE
Payer: MEDICARE

## 2024-02-26 DIAGNOSIS — D63.8 ANEMIA OF CHRONIC DISEASE: Primary | ICD-10-CM

## 2024-02-26 LAB — HGB BLD-MCNC: 9.9 G/DL

## 2024-02-26 PROCEDURE — 96372 THER/PROPH/DIAG INJ SC/IM: CPT

## 2024-02-26 PROCEDURE — 85018 HEMOGLOBIN: CPT

## 2024-02-26 NOTE — PROGRESS NOTES
Education Record    Learner:  Patient    Disease / Diagnosis: anemia    Barriers / Limitations:  None   Comments:    Method:  Discussion   Comments:    General Topics:  Medication   Comments:    Outcome:  Shows understanding   Comments:    Patient in treatment center today for retacrit injection. Patient states feeling well with no complaints or questions for the day. Patient Hgb level resulted at 9.9 so retacrit injection given and tolerated well. Patient is aware of next appointment date/time on mychart and left in stable condition.

## 2024-03-04 RX ORDER — IRBESARTAN 300 MG/1
300 TABLET ORAL NIGHTLY
Qty: 90 TABLET | Refills: 3 | Status: SHIPPED | OUTPATIENT
Start: 2024-03-04

## 2024-03-11 ENCOUNTER — APPOINTMENT (OUTPATIENT)
Dept: HEMATOLOGY/ONCOLOGY | Facility: HOSPITAL | Age: 84
End: 2024-03-11
Attending: INTERNAL MEDICINE
Payer: MEDICARE

## 2024-03-12 ENCOUNTER — TELEPHONE (OUTPATIENT)
Dept: INTERNAL MEDICINE CLINIC | Facility: CLINIC | Age: 84
End: 2024-03-12

## 2024-03-12 ENCOUNTER — OFFICE VISIT (OUTPATIENT)
Dept: HEMATOLOGY/ONCOLOGY | Facility: HOSPITAL | Age: 84
End: 2024-03-12
Attending: INTERNAL MEDICINE
Payer: MEDICARE

## 2024-03-12 DIAGNOSIS — D63.8 ANEMIA OF CHRONIC DISEASE: Primary | ICD-10-CM

## 2024-03-12 LAB — HGB BLD-MCNC: 10 G/DL

## 2024-03-12 PROCEDURE — 36415 COLL VENOUS BLD VENIPUNCTURE: CPT

## 2024-03-12 PROCEDURE — 85018 HEMOGLOBIN: CPT

## 2024-03-12 NOTE — PROGRESS NOTES
Education Record    Learner:  Patient    Disease / Diagnosis:    Barriers / Limitations:  None   Comments:    Method:  Discussion   Comments:    General Topics:  Plan of care reviewed   Comments:    Outcome:  Shows understanding   Comments:    Patient hgb at 10.0- no injection given today. Patient aware of next appointment date/time and left in stable condition.

## 2024-03-12 NOTE — TELEPHONE ENCOUNTER
Received [facility] office notes from date of service of 03-11-24. Placed in  bin for review.

## 2024-03-25 ENCOUNTER — OFFICE VISIT (OUTPATIENT)
Dept: HEMATOLOGY/ONCOLOGY | Facility: HOSPITAL | Age: 84
End: 2024-03-25
Attending: INTERNAL MEDICINE
Payer: MEDICARE

## 2024-03-25 VITALS
SYSTOLIC BLOOD PRESSURE: 117 MMHG | HEART RATE: 71 BPM | TEMPERATURE: 98 F | OXYGEN SATURATION: 95 % | DIASTOLIC BLOOD PRESSURE: 50 MMHG

## 2024-03-25 DIAGNOSIS — D63.8 ANEMIA OF CHRONIC DISEASE: Primary | ICD-10-CM

## 2024-03-25 LAB — HGB BLD-MCNC: 9.2 G/DL

## 2024-03-25 PROCEDURE — 96372 THER/PROPH/DIAG INJ SC/IM: CPT

## 2024-03-25 PROCEDURE — 36415 COLL VENOUS BLD VENIPUNCTURE: CPT

## 2024-03-25 PROCEDURE — 85018 HEMOGLOBIN: CPT

## 2024-03-25 NOTE — PROGRESS NOTES
Education Record    Learner:  Patient    Disease / Diagnosis: Here for Retacrit injection.     Barriers / Limitations:  None    Method:  Brief focused, printed material and  reinforcement    General Topics:  Plan of care reviewed    Outcome:  Shows understanding. Pt tolerated injection. Left in stable condition. Appts in place.

## 2024-03-30 RX ORDER — METOPROLOL SUCCINATE 25 MG/1
TABLET, EXTENDED RELEASE ORAL
Qty: 90 TABLET | Refills: 0 | Status: SHIPPED | OUTPATIENT
Start: 2024-03-30

## 2024-03-30 NOTE — TELEPHONE ENCOUNTER
Last VISIT 02-09-24 JL physical     Last CPE 02-09-24     Last REFILL 04-06-23     Last LABS 02-13-24 lipids, cmp    Future Appointments   Date Time Provider Department Center   4/8/2024  1:00 PM  [place] RN2  CHEMO [facility]   4/16/2024  1:15 PM  MR RM4 (3T WIDE)  MRI [facility]   4/22/2024  1:00 PM EH TX RN3  [facility]   5/6/2024  1:45 PM Marco A Wilcox MD  HEM ONC [facility]   5/6/2024  2:00 PM [facility] TX RN2  [facility]   8/12/2024  9:20 AM Ayad Chowdhury MD [facility]         Per PROTOCOL? Yes

## 2024-04-08 ENCOUNTER — APPOINTMENT (OUTPATIENT)
Dept: HEMATOLOGY/ONCOLOGY | Facility: HOSPITAL | Age: 84
End: 2024-04-08
Attending: INTERNAL MEDICINE
Payer: MEDICARE

## 2024-04-09 ENCOUNTER — OFFICE VISIT (OUTPATIENT)
Dept: HEMATOLOGY/ONCOLOGY | Facility: HOSPITAL | Age: 84
End: 2024-04-09
Attending: INTERNAL MEDICINE
Payer: MEDICARE

## 2024-04-09 VITALS
OXYGEN SATURATION: 97 % | DIASTOLIC BLOOD PRESSURE: 64 MMHG | WEIGHT: 142.63 LBS | HEIGHT: 64.02 IN | SYSTOLIC BLOOD PRESSURE: 161 MMHG | HEART RATE: 74 BPM | BODY MASS INDEX: 24.35 KG/M2 | RESPIRATION RATE: 16 BRPM | TEMPERATURE: 98 F

## 2024-04-09 DIAGNOSIS — D63.8 ANEMIA OF CHRONIC DISEASE: Primary | ICD-10-CM

## 2024-04-09 LAB — HGB BLD-MCNC: 8.8 G/DL

## 2024-04-09 PROCEDURE — 85018 HEMOGLOBIN: CPT

## 2024-04-09 PROCEDURE — 36415 COLL VENOUS BLD VENIPUNCTURE: CPT

## 2024-04-09 PROCEDURE — 96372 THER/PROPH/DIAG INJ SC/IM: CPT

## 2024-04-09 NOTE — PROGRESS NOTES
Pt arrived amb for PL and poss Epo.  Lab results met criteria for Epo administration.  Given per MD order.  Pt is adamant to give all 3 vials in one injection.  Pt rolan well.  Pt left amb with future appointments scheduled.

## 2024-04-22 ENCOUNTER — OFFICE VISIT (OUTPATIENT)
Dept: HEMATOLOGY/ONCOLOGY | Facility: HOSPITAL | Age: 84
End: 2024-04-22
Attending: INTERNAL MEDICINE
Payer: MEDICARE

## 2024-04-22 VITALS
BODY MASS INDEX: 24.59 KG/M2 | WEIGHT: 144 LBS | RESPIRATION RATE: 16 BRPM | DIASTOLIC BLOOD PRESSURE: 80 MMHG | SYSTOLIC BLOOD PRESSURE: 138 MMHG | HEART RATE: 75 BPM | TEMPERATURE: 98 F | OXYGEN SATURATION: 95 % | HEIGHT: 64.02 IN

## 2024-04-22 DIAGNOSIS — D63.8 ANEMIA OF CHRONIC DISEASE: Primary | ICD-10-CM

## 2024-04-22 LAB — HGB BLD-MCNC: 9.5 G/DL

## 2024-04-22 PROCEDURE — 96372 THER/PROPH/DIAG INJ SC/IM: CPT

## 2024-04-22 PROCEDURE — 36415 COLL VENOUS BLD VENIPUNCTURE: CPT

## 2024-04-22 PROCEDURE — 85018 HEMOGLOBIN: CPT

## 2024-05-06 ENCOUNTER — OFFICE VISIT (OUTPATIENT)
Dept: HEMATOLOGY/ONCOLOGY | Facility: HOSPITAL | Age: 84
End: 2024-05-06
Attending: INTERNAL MEDICINE
Payer: MEDICARE

## 2024-05-06 ENCOUNTER — OFFICE VISIT (OUTPATIENT)
Dept: HEMATOLOGY/ONCOLOGY | Facility: HOSPITAL | Age: 84
End: 2024-05-06
Attending: INTERNAL MEDICINE

## 2024-05-06 VITALS
HEIGHT: 64.02 IN | HEART RATE: 67 BPM | SYSTOLIC BLOOD PRESSURE: 162 MMHG | OXYGEN SATURATION: 96 % | TEMPERATURE: 97 F | DIASTOLIC BLOOD PRESSURE: 63 MMHG | WEIGHT: 145 LBS | RESPIRATION RATE: 16 BRPM | BODY MASS INDEX: 24.75 KG/M2

## 2024-05-06 DIAGNOSIS — D63.8 ANEMIA OF CHRONIC DISEASE: Primary | ICD-10-CM

## 2024-05-06 DIAGNOSIS — D63.8 ANEMIA OF CHRONIC DISEASE: ICD-10-CM

## 2024-05-06 LAB — HGB BLD-MCNC: 9.8 G/DL

## 2024-05-06 PROCEDURE — 96372 THER/PROPH/DIAG INJ SC/IM: CPT

## 2024-05-06 PROCEDURE — 99213 OFFICE O/P EST LOW 20 MIN: CPT | Performed by: INTERNAL MEDICINE

## 2024-05-06 NOTE — PROGRESS NOTES
Pt here for follow up and possible injection.  She complains of fatigue and states her legs feel heavy.    Outpatient Oncology Care Plan  Problem list:  knowledge deficit    Problems related to:    disease/disease progression    Interventions:  provided general teaching    Expected outcomes:  understands plan of care    Progress towards outcome:  making progress    Education Record    Learner:  Patient  Barriers / Limitations:  None  Method:  Brief focused  Outcome:  Shows understanding  Comments:

## 2024-05-06 NOTE — PROGRESS NOTES
Education Record    Learner:  Patient    Disease / Diagnosis: anemia    Barriers / Limitations:  None    Method:  Brief focused, printed material and  reinforcement    General Topics:  Plan of care reviewed    Outcome:  Shows understanding    Hgb 9.8--retacrit given. Seen by MD today. Made q2wk appts for retacrit until next MD appt in August

## 2024-05-14 ENCOUNTER — OFFICE VISIT (OUTPATIENT)
Dept: PODIATRY CLINIC | Facility: CLINIC | Age: 84
End: 2024-05-14

## 2024-05-14 DIAGNOSIS — M20.12 VALGUS DEFORMITY OF BOTH GREAT TOES: Primary | ICD-10-CM

## 2024-05-14 DIAGNOSIS — M20.42 HAMMER TOES OF BOTH FEET: ICD-10-CM

## 2024-05-14 DIAGNOSIS — L84 HELOMA DURUM: ICD-10-CM

## 2024-05-14 DIAGNOSIS — M20.41 HAMMER TOES OF BOTH FEET: ICD-10-CM

## 2024-05-14 DIAGNOSIS — M20.5X2 ACQUIRED OVERRIDING TOE OF LEFT FOOT: ICD-10-CM

## 2024-05-14 DIAGNOSIS — L60.0 ONYCHOCRYPTOSIS: ICD-10-CM

## 2024-05-14 DIAGNOSIS — M79.675 PAIN OF TOE OF LEFT FOOT: ICD-10-CM

## 2024-05-14 DIAGNOSIS — M20.11 VALGUS DEFORMITY OF BOTH GREAT TOES: Primary | ICD-10-CM

## 2024-05-14 PROCEDURE — 99203 OFFICE O/P NEW LOW 30 MIN: CPT | Performed by: PODIATRIST

## 2024-05-14 NOTE — PROGRESS NOTES
Cancer Center Progress Note  Patient Name: Karolina Contreras   YOB: 1940   Medical Record Number: [id number]   CSN: [id number]   Attending Physician: Marco A Wilcox M.D.       Date of Visit: 5/6/24       Chief Complaint:  Chief Complaint   Patient presents with    Follow - Up        Oncologic History:  Karolina Contreras is a 83 year old female referred by Dr. Chowdhury for evaluation of her chronic anemia.  The patient reports that she has been anemic for quite some time. She has a long history of weakness and pain in the BLE.  She reports that she has trouble, at times, rising from a seated position.  She also reports that the joints of her fingers swell intermittently. She was seen by Rheumatology, Dr. Braun in the past and was diagnosed with Polymyalgia rheumatica.  She was treated with steroids and then MTX.  She reports that these medications made her feel \"bad all over\". She barnard in [place] and was seen by a rheumatologist there and told that she did not have PMR.  The medications were discontinued. She has had frequent elevations of her ESR, ranging from  in our system, but reports a normal ESR in [place]. Consultation at the [facility] was suggested, but the referral was declined.  She was seen by [facility] in 2015.  The etiology of her anemia, at that time, was not clear. She had an elevated ferritin and was screened for hemochromatosis, but carries only one gene, which does not typically cause clinical hemochromatosis.  Observation was recommended and her hgb remained stable.  She was then seen by hematology in [place], and numerous blood tests, including an SPEP, B12, Folate, iron studies, Retic ct and haptoglobin were reportedly unrevealing.  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       No F/C/NS. No palpable LAD. No wt loss.      No family  history of anemia.    She was seen for an evaluation at the [facility]. The hematologic diagnosis of anemia of chronic illness was confirmed. They agreed with not starting supplemental BHASKAR, as her hgb was 9.5 there.      History of Present Illness:  Pt is here for follow up. She continues to complain fatigue, but it is stable. No F/C/NS.  No palpable LAD.     Performance Status:  ECOG 1    Past Medical History:  Past Medical History:    ANEMIA    Anemia    Anemia, unspecified    Anorexia    Anxiety    Arthritis    Back pain    Benign neoplasm of colon    Diarrhea, unspecified    Eye disease    Fatigue    Heart palpitations    History of cardiac murmur    History of depression    Lung nodule    Seen on [facility]     Normal cardiac stress test    at [facility]    Osteopenia    Mild.  Dexa scan 4/30/15. Repeat 2 years.    Other and unspecified hyperlipidemia    Other seborrheic keratosis    Pain in joints    Palpitations    PMR (polymyalgia rheumatica) (HCC)    Sicca syndrome (HCC)    Skin lesion    Sputum production    Stress    Unspecified essential hypertension    Wears glasses    Weight loss       Past Surgical History:  Past Surgical History:   Procedure Laterality Date    Colonoscopy  10/5/06    rectal hyperplastic polyp, diverticulosis, hemorrhoids    Colonoscopy,diagnostic  11/29/12    sigmoid polyp, diverticulosis    Other surgical history      Parotid duct divert,exc submand         Family History:  Family History   Problem Relation Age of Onset    Cancer Father 63        colon @63    Colon Cancer Father         66    Other (Other) Mother         osteoporosis    Other (Other) Sister         osteoporosis    Heart Disorder Maternal Grandmother         MI       Social History:  Social History     Socioeconomic History    Marital status:      Spouse name: Not on file    Number of children: Not on file    Years of education: Not on file    Highest education level: Not on file   Occupational History    Not on  file   Tobacco Use    Smoking status: Never    Smokeless tobacco: Never   Vaping Use    Vaping status: Never Used   Substance and Sexual Activity    Alcohol use: No     Alcohol/week: 0.0 standard drinks of alcohol    Drug use: No    Sexual activity: Not Currently   Other Topics Concern     Service Not Asked    Blood Transfusions Not Asked    Caffeine Concern Yes     Comment: 0-1 cups daily    Occupational Exposure Not Asked    Hobby Hazards Not Asked    Sleep Concern Not Asked    Stress Concern Not Asked    Weight Concern Not Asked    Special Diet Not Asked    Back Care Not Asked    Exercise No    Bike Helmet Not Asked    Seat Belt Not Asked    Self-Exams Not Asked   Social History Narrative    : 1966    Children: 2, misscarriage times 2    Exercise: walks    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[employer]     Caffeine intake: 1-2 Greek coffee/d             Social Determinants of Health     Financial Resource Strain: Low Risk  (8/11/2021)    Received from [facility] [place] [facility]    Overall Financial Resource Strain (CARDIA)     Difficulty of Paying Living Expenses: Not very hard     : Not on file     : Not on file     : Not on file     : Not on file     : Not on file   Food Insecurity: No Food Insecurity (8/11/2021)    Received from [facility] [place] [facility]    Hunger Vital Sign     Worried About Running Out of Food in the Last Year: Never true     Ran Out of Food in the Last Year: Never true     : Not on file     : Not on file     : Not on file     : Not on file   Transportation Needs: No Transportation Needs (8/11/2021)    Received from [facility] [place] [facility]    PRAPARE - Transportation     Lack of Transportation (Medical): No     Lack of Transportation (Non-Medical): No     : Not on file     : Not on file     : Not on file     : Not on file   Physical Activity: Inactive (8/11/2021)    Received from [facility] [place] [facility]    Exercise Vital Sign     Days of Exercise per Week: 0 days     Minutes of Exercise per Session: 0 min     : Not on file     : Not on file     : Not  on file     : Not on file   Stress: No Stress Concern Present (8/11/2021)    Received from [facility] [place] [facility]    [employer] [place] [employer] - Occupational Stress Questionnaire     Feeling of Stress : Only a little     : Not on file     : Not on file     : Not on file     : Not on file     : Not on file   Social Connections: Socially Integrated (8/11/2021)    Received from [facility] [place] [facility]    Social Connection and Isolation Panel [NHANES]     Frequency of Communication with Friends and Family: More than three times a week     Frequency of Social Gatherings with Friends and Family: Not on file     Attends Rastafarian Services: More than 4 times per year     Active Member of Clubs or Organizations: Yes     Attends Club or Organization Meetings: 1 to 4 times per year     Marital Status:    Housing Stability: Low Risk  (8/11/2021)    Received from [facility] [place] [facility]    Housing Stability Vital Sign     Unable to Pay for Housing in the Last Year: No     In the last 12 months, how many places have you lived?: 1     In the last 12 months, was there a time when you did not have a steady place to sleep or slept in a shelter (including now)?: No       Current Medications:    Current Outpatient Medications:     metoprolol succinate ER 25 MG Oral Tablet 24 Hr, TAKE 1 TABLET DAILY, Disp: 90 tablet, Rfl: 0    Irbesartan 300 MG Oral Tab, Take 1 tablet (300 mg total) by mouth nightly., Disp: 90 tablet, Rfl: 3    Lifitegrast (XIIDRA) 5 % Ophthalmic Solution, one drop both eyes Ophthalmic Twice a day for 90 days, Disp: , Rfl:     rosuvastatin 10 MG Oral Tab, Take 1 tablet (10 mg total) by mouth daily., Disp: 90 tablet, Rfl: 3    aspirin 81 MG Oral Tab EC, Take 1 tablet (81 mg total) by mouth daily., Disp: , Rfl: 0    magnesium 30 MG Oral Tab, Take 1 tablet (30 mg total) by mouth twice a week., Disp: , Rfl:     Ascorbic Acid (VITAMIN C) 1000 MG Oral Tab, Take 1 tablet (1,000 mg total) by mouth daily., Disp: , Rfl:     vitamin B-12 50  MCG Oral Tab, Take 1 tablet (50 mcg total) by mouth daily., Disp: , Rfl:     Multiple Vitamins-Minerals (OCUVITE EXTRA) Oral Tab, Take 2 tablets by mouth daily., Disp: , Rfl:     acetaminophen 325 MG Oral Tab, Take 1 tablet (325 mg total) by mouth every 6 (six) hours as needed for Pain., Disp: , Rfl:     Cholecalciferol (VITAMIN D) 1000 UNIT Oral Cap, Take 5,000 Units by mouth. Patient's daughter reports patient is taking Vitamin D 2000 units every other day. , Disp: , Rfl:     CENTRUM SILVER OR TABS, every other day, Disp: , Rfl:     Allergies:  No Known Allergies     Review of Systems:    Constitutional No fevers, chills, night sweats, excessive fatigue or weight loss.   Eyes No significant visual difficulties. No diplopia. No yellowing.   Hematologic/Lymphatic Normal - No easy bruising or bleeding.  No tender or palpable lymph nodes.   Respiratory No dyspnea, pleuritic chest pain, cough or hemoptysis.   Cardiovascular No anginal chest pain, palpitations or orthopnea.   Gastrointestinal No nausea, vomiting, diarrhea, GI bleeding, or constipation. NL appetite.   Genitorurinary  No hematuria, dysuria, abnormal bleeding, or incontinence.   Integumentary No rashes or yellowing of the skin   Neurologic No headache, blurred vision, and no areas of focal weakness. Normal gait.   Psychiatric No insomnia, depression, martin or mood swings.         Vital Signs:  BP (!) 162/63 (BP Location: Left arm, Patient Position: Sitting, Cuff Size: adult)   Pulse 67   Temp 96.9 °F (36.1 °C) (Temporal)   Resp 16   Ht 1.626 m (5' 4.02\")   Wt 65.8 kg (145 lb)   LMP  (LMP Unknown)   SpO2 96%   BMI 24.88 kg/m²     Physical Examination:    Constitutional Normal - Mood and affect appropriate. Appears close to chronological age. Well nourished. Well developed.   Eyes Normal - Conjunctivae and sclerae are clear and without icterus. Pupils are reactive and equal.   Hematologic/Lymphatic Normal - No petechiae or purpura.  No tender or  palpable lymph nodes in the cervical, supraclavicular, axillary or inguinal area.   Respiratory Normal - Lungs are clear to auscultation, no wheezing.   Cardiovascular Normal - Regular rate and rhythm, no murmurs.   Abdomen Normal - Non-tender, non-distended, no masses, ascites or hepatosplenomegaly.    Extremities Normal - No cyanosis, clubbing or edema.    Integumentary Normal - No rashes and noJaundice   Neurologic Normal - No sensory or motor deficits, normal cerebellar function, normal gait, cranial nerves intact.   Psychiatric Normal - A&Ox3. Coherent speech. Verbalizes understanding of our discussions today.       Lab:   Latest Reference Range & Units 05/06/24 13:28   Hemoglobin 12.0 - 16.0 g/dL 9.8 (L)   (L): Data is abnormally low    Radiology:    Pathology:    Impression and Plan:  Anemia of chronic illness: With steroid treatment, her hgb returned to 10-10.5, which has been her baseline for the past few years. 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 Her hgb drifted down to the point where she would benefit from Retacrit to improve her quality of life. Her iron stores were replete. She is tolerating 30,000 units q2 weeks with a hgb 9.5-10.5. Continue the current schedule. Iron stores are replete.      Polymyalgia rheumatica and Sjogrens: Management per rheumatology. She has been weaned from her steroids    Planned Follow Up:  3 months    Electronically Signed by:    Marco A Wilcox M.D.  [facility]

## 2024-05-18 NOTE — PROGRESS NOTES
Karolina Contreras is a 83 year old female.   Chief Complaint   Patient presents with    Toenail Fungus     Bilateral toes- white on the nails-patient denies pain     Toenail Care     Nail care and foot check- patient denies pain          HPI:   Patient presents to the clinic for toenail care and for fungus in her toenails she also has a painful corn on the second toe of her left foot.  At today's visit reviewed nurse's history as taken above, allergies medications and medical history as documented below.  All changes duly noted  Allergies: Patient has no known allergies.   Current Outpatient Medications   Medication Sig Dispense Refill    metoprolol succinate ER 25 MG Oral Tablet 24 Hr TAKE 1 TABLET DAILY 90 tablet 0    Irbesartan 300 MG Oral Tab Take 1 tablet (300 mg total) by mouth nightly. 90 tablet 3    Lifitegrast (XIIDRA) 5 % Ophthalmic Solution one drop both eyes Ophthalmic Twice a day for 90 days      rosuvastatin 10 MG Oral Tab Take 1 tablet (10 mg total) by mouth daily. 90 tablet 3    aspirin 81 MG Oral Tab EC Take 1 tablet (81 mg total) by mouth daily.  0    magnesium 30 MG Oral Tab Take 1 tablet (30 mg total) by mouth twice a week.      Ascorbic Acid (VITAMIN C) 1000 MG Oral Tab Take 1 tablet (1,000 mg total) by mouth daily.      vitamin B-12 50 MCG Oral Tab Take 1 tablet (50 mcg total) by mouth daily.      Multiple Vitamins-Minerals (OCUVITE EXTRA) Oral Tab Take 2 tablets by mouth daily.      acetaminophen 325 MG Oral Tab Take 1 tablet (325 mg total) by mouth every 6 (six) hours as needed for Pain.      Cholecalciferol (VITAMIN D) 1000 UNIT Oral Cap Take 5,000 Units by mouth. Patient's daughter reports patient is taking Vitamin D 2000 units every other day.       CENTRUM SILVER OR TABS every other day        Past Medical History:    ANEMIA    Anemia    Anemia, unspecified    Anorexia    Anxiety    Arthritis    Back pain    Benign neoplasm of colon    Diarrhea, unspecified    Eye disease     Fatigue    Heart palpitations    History of cardiac murmur    History of depression    Lung nodule    Seen on [facility]     Normal cardiac stress test    at [facility]    Osteopenia    Mild.  Dexa scan 4/30/15. Repeat 2 years.    Other and unspecified hyperlipidemia    Other seborrheic keratosis    Pain in joints    Palpitations    PMR (polymyalgia rheumatica) (HCC)    Sicca syndrome (HCC)    Skin lesion    Sputum production    Stress    Unspecified essential hypertension    Wears glasses    Weight loss      Past Surgical History:   Procedure Laterality Date    Colonoscopy  10/5/06    rectal hyperplastic polyp, diverticulosis, hemorrhoids    Colonoscopy,diagnostic  11/29/12    sigmoid polyp, diverticulosis    Other surgical history      Parotid duct divert,exc submand        Family History   Problem Relation Age of Onset    Cancer Father 63        colon @63    Colon Cancer Father         66    Other (Other) Mother         osteoporosis    Other (Other) Sister         osteoporosis    Heart Disorder Maternal Grandmother         MI      Social History     Socioeconomic History    Marital status:    Tobacco Use    Smoking status: Never    Smokeless tobacco: Never   Vaping Use    Vaping status: Never Used   Substance and Sexual Activity    Alcohol use: No     Alcohol/week: 0.0 standard drinks of alcohol    Drug use: No    Sexual activity: Not Currently   Other Topics Concern    Caffeine Concern Yes     Comment: 0-1 cups daily    Exercise No           REVIEW OF SYSTEMS:   Today reviewed systens as documented below  GENERAL HEALTH: feels well otherwise  SKIN: Refer to exam below  RESPIRATORY: denies shortness of breath with exertion  CARDIOVASCULAR: denies chest pain on exertion  GI: denies abdominal pain and denies heartburn  NEURO: denies headaches    EXAM:   LMP  (LMP Unknown)   GENERAL: well developed, well nourished, in no apparent distress  EXTREMITIES:   1. Integument: Skin on the patient's feet is warm and dry.   There is an enlarged medial eminence of the first metatarsal head bilateral feet it is not painful does not restrict her from wearing shoes.  She has hyperkeratosis over the PIP joint of the second toe which overlaps the hallux on the left foot.  Her nails 1-5 show mild thickening with dystrophic changes incurvation of the medial lateral nail borders of the hallux with hyperkeratotic impactions in the groove bilateral hallux.   2. Vascular: Has palpable pulses dorsalis pedis posterior tibial bilateral they are symmetrical and equivocal with good cap return the pedal digits   3. Neurologic: Has intact sensorium there is no deficits noted   4. Musculoskeletal: Patient has a track bound hallux valgus deformity bilaterally with medial deviation of the first metatarsal.  The second toe is in a severely contracted position on the left foot which can be slightly reduced but not perfectly straight and overlaps the adjacent hallux due to the bunion deformity.  Very mild hammertoes of all lesser digits.    ASSESSMENT AND PLAN:   Diagnoses and all orders for this visit:    Valgus deformity of both great toes    Hammer toes of both feet    Onychocryptosis    Heloma durum    Pain of toe of left foot    Acquired overriding toe of left foot        Plan: Today using a nail nippers were trimmed and debrided toenails 1-5 manually and mechanically in girth and width as far down to healthy tissue as possible on both feet.  This was done uneventfully there was no hemorrhage.  There is mild incurvation of the medial and lateral nail borders of the hallux which using a slant back technique were removed and they would not get ingrown.   Using a 15 blade trimmed down and debrided painful corn.  Dispensed a toe stabilizer and patient was taught proper application and wearing instructions.  She will not wear it to bed.  Advised follow-up again in 2 months but sooner if required.    The patient indicates understanding of these issues and  agrees to the plan.    LILIANA GutiérrezM

## 2024-05-20 ENCOUNTER — OFFICE VISIT (OUTPATIENT)
Dept: HEMATOLOGY/ONCOLOGY | Facility: HOSPITAL | Age: 84
End: 2024-05-20
Attending: INTERNAL MEDICINE

## 2024-05-20 VITALS
WEIGHT: 143 LBS | BODY MASS INDEX: 24.41 KG/M2 | SYSTOLIC BLOOD PRESSURE: 152 MMHG | OXYGEN SATURATION: 95 % | HEART RATE: 66 BPM | DIASTOLIC BLOOD PRESSURE: 74 MMHG | TEMPERATURE: 98 F | HEIGHT: 64.02 IN

## 2024-05-20 DIAGNOSIS — D63.8 ANEMIA OF CHRONIC DISEASE: Primary | ICD-10-CM

## 2024-05-20 LAB — HGB BLD-MCNC: 9.2 G/DL

## 2024-05-20 PROCEDURE — 96372 THER/PROPH/DIAG INJ SC/IM: CPT

## 2024-05-20 PROCEDURE — 36415 COLL VENOUS BLD VENIPUNCTURE: CPT

## 2024-05-20 PROCEDURE — 85018 HEMOGLOBIN: CPT

## 2024-05-20 NOTE — PROGRESS NOTES
Education Record    Learner:  Patient    Disease / Diagnosis: Here for Retacrit.     Barriers / Limitations:  None    Method:  Brief focused, printed material and  reinforcement    General Topics:  Plan of care reviewed    Outcome:  Shows understanding. Pt tolerated injection. Left in stable condition.

## 2024-06-03 ENCOUNTER — OFFICE VISIT (OUTPATIENT)
Dept: HEMATOLOGY/ONCOLOGY | Facility: HOSPITAL | Age: 84
End: 2024-06-03
Attending: INTERNAL MEDICINE
Payer: MEDICARE

## 2024-06-03 VITALS
RESPIRATION RATE: 16 BRPM | SYSTOLIC BLOOD PRESSURE: 128 MMHG | HEART RATE: 67 BPM | WEIGHT: 141 LBS | TEMPERATURE: 97 F | OXYGEN SATURATION: 95 % | HEIGHT: 64.02 IN | BODY MASS INDEX: 24.07 KG/M2 | DIASTOLIC BLOOD PRESSURE: 68 MMHG

## 2024-06-03 DIAGNOSIS — D63.8 ANEMIA OF CHRONIC DISEASE: Primary | ICD-10-CM

## 2024-06-03 LAB — HGB BLD-MCNC: 9.8 G/DL

## 2024-06-03 PROCEDURE — 96372 THER/PROPH/DIAG INJ SC/IM: CPT

## 2024-06-03 PROCEDURE — 85018 HEMOGLOBIN: CPT

## 2024-06-03 PROCEDURE — 36415 COLL VENOUS BLD VENIPUNCTURE: CPT

## 2024-06-03 NOTE — PROGRESS NOTES
Education Record    Learner:  Patient    Disease / Diagnosis: anemia    Barriers / Limitations:  None   Comments:    Method:  Discussion   Comments:    General Topics:  Medication, Side effects and symptom management, Plan of care reviewed, and Fall risk and prevention   Comments:    Outcome:  Shows understanding   Comments:    Hgb 9.8 today, retacrit inj 30K units given per orders. Pt discharged ambulatory in stable condition.

## 2024-06-14 ENCOUNTER — HOSPITAL ENCOUNTER (OUTPATIENT)
Age: 84
Discharge: HOME | End: 2024-06-14
Attending: EMERGENCY MEDICINE

## 2024-06-14 ENCOUNTER — APPOINTMENT (OUTPATIENT)
Dept: ULTRASOUND IMAGING | Age: 84
End: 2024-06-14
Attending: EMERGENCY MEDICINE

## 2024-06-14 ENCOUNTER — NURSE TRIAGE (OUTPATIENT)
Dept: INTERNAL MEDICINE CLINIC | Facility: CLINIC | Age: 84
End: 2024-06-14

## 2024-06-14 VITALS
HEART RATE: 70 BPM | HEIGHT: 65 IN | DIASTOLIC BLOOD PRESSURE: 49 MMHG | OXYGEN SATURATION: 94 % | BODY MASS INDEX: 23.66 KG/M2 | SYSTOLIC BLOOD PRESSURE: 126 MMHG | RESPIRATION RATE: 18 BRPM | WEIGHT: 142 LBS | TEMPERATURE: 98 F

## 2024-06-14 DIAGNOSIS — S70.12XA THIGH HEMATOMA, LEFT, INITIAL ENCOUNTER: Primary | ICD-10-CM

## 2024-06-14 PROCEDURE — 99214 OFFICE O/P EST MOD 30 MIN: CPT

## 2024-06-14 PROCEDURE — 93971 EXTREMITY STUDY: CPT | Performed by: EMERGENCY MEDICINE

## 2024-06-14 PROCEDURE — 99213 OFFICE O/P EST LOW 20 MIN: CPT

## 2024-06-14 NOTE — TELEPHONE ENCOUNTER
Action Requested: Summary for Provider     []  Critical Lab, Recommendations Needed  [] Need Additional Advice  [x]   FYI    []   Need Orders  [] Need Medications Sent to Pharmacy  []  Other     SUMMARY: Received call from pt. Per pt, last night she noticed the back of her L knee was black and painful. Pain goes up into thigh. Denies swelling/injury. Per pt, pain 9/10 consistently. Given pain and no appts in office today, advised to be seen in [facility] today. Location information provided. Pt stated understanding and agreed to plan.     Reason for call: No chief complaint on file.  Onset: Data Unavailable                 Reason for Disposition   Thigh or calf pain and only 1 side and present > 1 hour    Protocols used: Knee Pain-A-[place]

## 2024-06-14 NOTE — DISCHARGE INSTRUCTIONS
Follow-up with your primary care doctor  Take Tylenol or ibuprofen as needed for pain  Continue your home medications  Return if any worsening symptoms or new concerns

## 2024-06-14 NOTE — ED PROVIDER NOTES
Patient Seen in: [facility]      History     Chief Complaint   Patient presents with    Leg Pain     Stated Complaint: left leg pain and bruising x 7 days    Subjective:   HPI    83-year-old female with a history of anemia, anorexia, anxiety, heart palpitations depression presents to the immediate care for complaints of left thigh pain and bruising.  Patient states that she noticed that she had pain to her left hip area for the last 7 days.  She states that over the last 2 days its gotten progressively better.  The pain is worse with palpation and with laying on her side.  She denies any trauma or falls.  Denies taking any anticoagulants.  Today she notes that she has some bruising to the posterior aspect of her left thigh and behind her knee.  She has no prior history of DVT or PE.  She denies any chest pain or shortness of breath.  Patient is able to weight-bear and ambulate without difficulty.    Objective:   Past Medical History:    ANEMIA    Anemia    Anemia, unspecified    Anorexia    Anxiety    Arthritis    Back pain    Benign neoplasm of colon    Diarrhea, unspecified    Eye disease    Fatigue    Heart palpitations    History of cardiac murmur    History of depression    Lung nodule    Seen on [facility]     Normal cardiac stress test    at [facility]    Osteopenia    Mild.  Dexa scan 4/30/15. Repeat 2 years.    Other and unspecified hyperlipidemia    Other seborrheic keratosis    Pain in joints    Palpitations    PMR (polymyalgia rheumatica) (HCC)    Sicca syndrome (HCC)    Skin lesion    Sputum production    Stress    Unspecified essential hypertension    Wears glasses    Weight loss              Past Surgical History:   Procedure Laterality Date    Colonoscopy  10/5/06    rectal hyperplastic polyp, diverticulosis, hemorrhoids    Colonoscopy,diagnostic  11/29/12    sigmoid polyp, diverticulosis    Other surgical history      Parotid duct divert,exc submand                  Social History      Socioeconomic History    Marital status:    Tobacco Use    Smoking status: Never    Smokeless tobacco: Never   Vaping Use    Vaping status: Never Used   Substance and Sexual Activity    Alcohol use: No     Alcohol/week: 0.0 standard drinks of alcohol    Drug use: No    Sexual activity: Not Currently   Other Topics Concern    Caffeine Concern Yes     Comment: 0-1 cups daily    Exercise No   Social History Narrative    : 1966    Children: 2, misscarriage times 2    Exercise: walks    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[employer]     Caffeine intake: 1-2 [employer] coffee/d             Social Determinants of Health     Financial Resource Strain: Low Risk  (8/11/2021)    Received from [facility]    Overall Financial Resource Strain (CARDIA)     Difficulty of Paying Living Expenses: Not very hard   Food Insecurity: No Food Insecurity (8/11/2021)    Received from [facility]    Hunger Vital Sign     Worried About Running Out of Food in the Last Year: Never true     Ran Out of Food in the Last Year: Never true   Transportation Needs: No Transportation Needs (8/11/2021)    Received from [facility]    PRAPARE - Transportation     Lack of Transportation (Medical): No     Lack of Transportation (Non-Medical): No   Physical Activity: Inactive (8/11/2021)    Received from [facility]    Exercise Vital Sign     Days of Exercise per Week: 0 days     Minutes of Exercise per Session: 0 min   Stress: No Stress Concern Present (8/11/2021)    Received from [facility]    [employer] [place] [employer] - Occupational Stress Questionnaire     Feeling of Stress : Only a little   Social Connections: Socially Integrated (8/11/2021)    Received from [facility]    Social Connection and Isolation Panel [NHANES]     Frequency of Communication with Friends and Family: More than three times a week     Attends Hoahaoism Services: More than 4 times per year     Active Member of Clubs or Organizations: Yes     Attends Club or Organization  Meetings: 1 to 4 times per year     Marital Status:    Housing Stability: Low Risk  (8/11/2021)    Received from [facility]    Housing Stability Vital Sign     Unable to Pay for Housing in the Last Year: No     In the last 12 months, how many places have you lived?: 1     In the last 12 months, was there a time when you did not have a steady place to sleep or slept in a shelter (including now)?: No              Review of Systems    Positive for stated complaint: left leg pain and bruising x 7 days  Other systems are as noted in HPI.  Constitutional and vital signs reviewed.      All other systems reviewed and negative except as noted above.    Physical Exam     ED Triage Vitals [06/14/24 1140]   /49   Pulse 70   Resp 18   Temp 97.5 °F (36.4 °C)   Temp src Temporal   SpO2 94 %   O2 Device None (Room air)       Current Vitals:   Vital Signs  BP: 126/49  Pulse: 70  Resp: 18  Temp: 97.5 °F (36.4 °C)  Temp src: Temporal    Oxygen Therapy  SpO2: 94 %  O2 Device: None (Room air)            Physical Exam     General: Alert and oriented. No acute distress.  HEENT: Normocephalic. No evidence of trauma. Extraocular movements are intact.  Cardiovascular exam: Regular rate and rhythm  Lungs: Clear to auscultation bilaterally.  Abdomen: Soft, nondistended, nontender.  Extremities: No evidence of deformity. No clubbing or cyanosis.  Patient noted to have a hematoma to the posterior aspect of her left thigh extending down to her popliteal region.  Patient did have a palpable popliteal pulse.  Patient did have a 2+ left dorsalis pedal pulse.  Neuro: No focal deficit is noted.    ED Course   Labs Reviewed - No data to display  Unclear etiology for patient's hematoma.  She denies any muscle injury or trauma or fall.  She does not take any anticoagulants.   Will obtain a venous Doppler ultrasound of the left leg for further evaluation.  Low clinical suspicion for DVT.  Expect that this is a soft tissue hematoma  Venous  Doppler ultrasound was negative for evidence of a DVT.        US VENOUS DOPPLER LEG LEFT - DIAG IMG (CPT=93971) (Final result)  Result time 06/14/24 12:48:51  Final result by Francis Jaimes MD (06/14/24 12:48:51)                Impression:    CONCLUSION:  Unremarkable left lower extremity venous ultrasound examination.                Findings were discussed with the patient at bedside.  Patient will be discharged home.  Recommend follow-up with her primary care doctor.  Unclear etiology for the cause of her hematoma but her muscle compartments are soft.  She is able to weight-bear and ambulate without difficulty at this time.       MDM   Patient was screened and evaluated during this visit.   As a treating physician attending to the patient, I determined, within reasonable clinical confidence and prior to discharge, that an emergency medical condition was not or was no longer present.  There was no indication for further evaluation, treatment or admission on an emergency basis.  Comprehensive verbal and written discharge and follow-up instructions were provided to help prevent relapse or worsening.  Patient was instructed to follow-up with her primary care provider for further evaluation and treatment, but to return immediately to the ER for worsening, concerning, new, changing or persisting symptoms.  I discussed the case with the patient and they had no questions, complaints, or concerns.  Patient felt comfortable going home.    ^^Please note that this report has been produced using speech recognition software and may contain errors related to that system including, but not limited to, errors in grammar, punctuation, and spelling, as well as words and phrases that possibly may have been recognized inappropriately.  If there are any questions or concerns, contact the dictating provider for clarification                                   Medical Decision Making      Disposition and Plan     Clinical  Impression:  1. Thigh hematoma, left, initial encounter         Disposition:  Discharge  6/14/2024  1:03 pm    Follow-up:  Ayad Chowdhury MD  [address] [place] [address]  [phone]    Call   As needed, If symptoms worsen          Medications Prescribed:  Current Discharge Medication List

## 2024-06-17 ENCOUNTER — OFFICE VISIT (OUTPATIENT)
Dept: HEMATOLOGY/ONCOLOGY | Facility: HOSPITAL | Age: 84
End: 2024-06-17
Attending: INTERNAL MEDICINE
Payer: MEDICARE

## 2024-06-17 ENCOUNTER — TELEPHONE (OUTPATIENT)
Dept: HEMATOLOGY/ONCOLOGY | Facility: HOSPITAL | Age: 84
End: 2024-06-17

## 2024-06-17 VITALS
OXYGEN SATURATION: 96 % | TEMPERATURE: 98 F | HEIGHT: 64.02 IN | RESPIRATION RATE: 16 BRPM | HEART RATE: 76 BPM | SYSTOLIC BLOOD PRESSURE: 148 MMHG | BODY MASS INDEX: 24.41 KG/M2 | DIASTOLIC BLOOD PRESSURE: 63 MMHG | WEIGHT: 143 LBS

## 2024-06-17 DIAGNOSIS — D63.8 ANEMIA OF CHRONIC DISEASE: Primary | ICD-10-CM

## 2024-06-17 LAB — HGB BLD-MCNC: 9.2 G/DL

## 2024-06-17 PROCEDURE — 85018 HEMOGLOBIN: CPT

## 2024-06-17 PROCEDURE — 36415 COLL VENOUS BLD VENIPUNCTURE: CPT

## 2024-06-17 PROCEDURE — 96372 THER/PROPH/DIAG INJ SC/IM: CPT

## 2024-06-17 NOTE — PROGRESS NOTES
Education Record    Learner:  Patient    Disease / Diagnosis: anemia    Barriers / Limitations:  None   Comments:    Method:  Discussion   Comments:    General Topics:  Medication, Side effects and symptom management, Plan of care reviewed, and Fall risk and prevention   Comments:    Outcome:  Shows understanding   Comments:    Pt arrives with large hematoma on back of left leg. She states she did not fall and does not know where the bruise came from. Per pt, she had it evaluated at urgent care and they did an ultrasound to r/o DVT. She reports that her pain has improved.    Hgb 9.2, retacrit inj adminstered per orders. Pt tolerated well. Discharged ambulatory in stable condition.

## 2024-06-17 NOTE — TELEPHONE ENCOUNTER
Patient has a black lump behind her left knees size of a grapefruit.  Was seen in Urgent Care and had US negative for blood clot.    She is due in today for injection and requesting MD look at area.  Instructed to show nurse area at apt time for evaluation.  She verbalizes understanding.

## 2024-06-17 NOTE — TELEPHONE ENCOUNTER
Karolina calling she says she has a black lump behind her left knee size  of a grapefruit. She does have a appointment today and asked to see dr bear. yoko

## 2024-07-01 ENCOUNTER — OFFICE VISIT (OUTPATIENT)
Dept: HEMATOLOGY/ONCOLOGY | Facility: HOSPITAL | Age: 84
End: 2024-07-01
Attending: INTERNAL MEDICINE
Payer: MEDICARE

## 2024-07-01 VITALS
TEMPERATURE: 98 F | HEART RATE: 66 BPM | DIASTOLIC BLOOD PRESSURE: 52 MMHG | BODY MASS INDEX: 25 KG/M2 | RESPIRATION RATE: 16 BRPM | HEIGHT: 64.02 IN | OXYGEN SATURATION: 96 % | SYSTOLIC BLOOD PRESSURE: 152 MMHG

## 2024-07-01 DIAGNOSIS — D63.8 ANEMIA OF CHRONIC DISEASE: Primary | ICD-10-CM

## 2024-07-01 LAB — HGB BLD-MCNC: 9.4 G/DL

## 2024-07-01 PROCEDURE — 36415 COLL VENOUS BLD VENIPUNCTURE: CPT

## 2024-07-01 PROCEDURE — 85018 HEMOGLOBIN: CPT

## 2024-07-01 PROCEDURE — 96372 THER/PROPH/DIAG INJ SC/IM: CPT

## 2024-07-01 RX ORDER — METOPROLOL SUCCINATE 25 MG/1
25 TABLET, EXTENDED RELEASE ORAL DAILY
Qty: 90 TABLET | Refills: 3 | Status: SHIPPED | OUTPATIENT
Start: 2024-07-01

## 2024-07-01 NOTE — TELEPHONE ENCOUNTER
Please Review. Protocol Failed; No Protocol   BP Readings from Last 1 Encounters:   06/17/24 148/63   Recent Visits  Date Type Provider Dept   02/09/24 Office Visit Ayad Chowdhury MD [facility] [address]   08/11/23 Office Visit Ayad Chowdhury MD [facility] [address]   02/06/23 Office Visit Ayad Chowdhury MD [facility] [address]   Showing recent visits within past 540 days with a meds authorizing provider and meeting all other requirements  Future Appointments  Date Type Provider Dept   08/12/24 Appointment Ayad Chowdhury MD [facility] [address]   Showing future appointments within next 150 days with a meds authorizing provider and meeting all other requirements    Requested Prescriptions   Pending Prescriptions Disp Refills    METOPROLOL SUCCINATE ER 25 MG Oral Tablet 24 Hr [Pharmacy Med Name: METOPROLOL SUCCINATE ER TABS 25MG] 90 tablet 3     Sig: TAKE 1 TABLET DAILY       Hypertension Medications Protocol Failed - 6/26/2024 11:09 PM        Failed - Last BP reading less than 140/90     BP Readings from Last 1 Encounters:   06/17/24 148/63               Passed - CMP or BMP in past 12 months        Passed - In person appointment or virtual visit in the past 12 mos or appointment in next 3 mos     Recent Outpatient Visits              Today Anemia of chronic disease    [facility]    Office Visit    2 weeks ago Anemia of chronic disease    [facility]    Office Visit    4 weeks ago Anemia of chronic disease    [facility]    Office Visit    1 month ago Anemia of chronic disease    [facility]    Office Visit    1 month ago Valgus deformity of both great toes    [facility] Marco A Gibbons DPM    Office Visit          Future Appointments         Provider Department Appt Notes    Today [facility] [place] RN7 [facility] pl, inj  retacrit    In 2 weeks [facility] [place] RN7 [facility] pl, inj retacrit    In 4 weeks [facility] [place] RN6 [facility] pl, inj retacrit    In 1 month Ayad Chowdhury MD [address] 6 mo fup    In 1 month Marco A Wilcox MD [facility] in [place] md ammon, inj retacrit    In 1 month [facility] [place] RN2 [facility] Pete verde md, inj retacrit                    Passed - EGFRCR or GFRNAA > 50     GFR Evaluation  EGFRCR: 83 , resulted on 2/13/2024                 Future Appointments         Provider Department Appt Notes    Today [facility] [place] RN7 [facility] pl, inj retacrit    In 2 weeks [facility] [place] RN7 [facility] pl, inj retacrit    In 4 weeks [facility] [place] RN6 [facility] pl, inj retacrit    In 1 month Ayad Chowdhury MD [address] 6 mo fup    In 1 month Marco A Wilcox MD [facility] in [place] md ammon, inj retacrit    In 1 month [facility] [place] RN2 [facility] [place], inj retacrit          Recent Outpatient Visits              Today Anemia of chronic disease    [facility]    Office Visit    2 weeks ago Anemia of chronic disease    [facility]    Office Visit    4 weeks ago Anemia of chronic disease    [facility]    Office Visit    1 month ago Anemia of chronic disease    [facility]    Office Visit    1 month ago Valgus deformity of both great toes    [facility] Marco A Gibbons DPM    Office Visit

## 2024-07-01 NOTE — PROGRESS NOTES
Education Record    Learner:  Patient     Disease / Diagnosis: anemia    Barriers / Limitations:  None    Method:  Brief focused, printed material and  reinforcement    General Topics:  Plan of care reviewed    Outcome:  Shows understanding    Hgb 9.4--retacrit given. Has appts

## 2024-07-15 ENCOUNTER — OFFICE VISIT (OUTPATIENT)
Dept: HEMATOLOGY/ONCOLOGY | Facility: HOSPITAL | Age: 84
End: 2024-07-15
Attending: INTERNAL MEDICINE
Payer: MEDICARE

## 2024-07-15 VITALS
RESPIRATION RATE: 18 BRPM | TEMPERATURE: 98 F | OXYGEN SATURATION: 94 % | SYSTOLIC BLOOD PRESSURE: 126 MMHG | DIASTOLIC BLOOD PRESSURE: 55 MMHG | HEART RATE: 71 BPM

## 2024-07-15 DIAGNOSIS — D63.8 ANEMIA OF CHRONIC DISEASE: Primary | ICD-10-CM

## 2024-07-15 LAB — HGB BLD-MCNC: 9.2 G/DL

## 2024-07-15 PROCEDURE — 36415 COLL VENOUS BLD VENIPUNCTURE: CPT

## 2024-07-15 PROCEDURE — 96372 THER/PROPH/DIAG INJ SC/IM: CPT

## 2024-07-15 PROCEDURE — 85018 HEMOGLOBIN: CPT

## 2024-07-15 NOTE — PROGRESS NOTES
Education Record    Learner:  Patient    Disease / Diagnosis:retacrit injection for hgb 9.2    Barriers / Limitations:  None   Comments:    Method:  Discussion   Comments:    General Topics:  Medication and Plan of care reviewed   Comments:    Outcome:  Shows understanding   Comments:

## 2024-07-29 ENCOUNTER — OFFICE VISIT (OUTPATIENT)
Dept: HEMATOLOGY/ONCOLOGY | Facility: HOSPITAL | Age: 84
End: 2024-07-29
Attending: INTERNAL MEDICINE
Payer: MEDICARE

## 2024-07-29 VITALS
BODY MASS INDEX: 23.73 KG/M2 | DIASTOLIC BLOOD PRESSURE: 55 MMHG | TEMPERATURE: 97 F | HEIGHT: 64.02 IN | OXYGEN SATURATION: 95 % | WEIGHT: 139 LBS | HEART RATE: 70 BPM | RESPIRATION RATE: 16 BRPM | SYSTOLIC BLOOD PRESSURE: 183 MMHG

## 2024-07-29 DIAGNOSIS — D63.8 ANEMIA OF CHRONIC DISEASE: Primary | ICD-10-CM

## 2024-07-29 LAB — HGB BLD-MCNC: 9.3 G/DL

## 2024-07-29 PROCEDURE — 96372 THER/PROPH/DIAG INJ SC/IM: CPT

## 2024-07-29 PROCEDURE — 85018 HEMOGLOBIN: CPT

## 2024-07-29 PROCEDURE — 36415 COLL VENOUS BLD VENIPUNCTURE: CPT

## 2024-08-12 ENCOUNTER — PATIENT MESSAGE (OUTPATIENT)
Dept: INTERNAL MEDICINE CLINIC | Facility: CLINIC | Age: 84
End: 2024-08-12

## 2024-08-12 ENCOUNTER — APPOINTMENT (OUTPATIENT)
Dept: HEMATOLOGY/ONCOLOGY | Facility: HOSPITAL | Age: 84
End: 2024-08-12
Attending: INTERNAL MEDICINE
Payer: MEDICARE

## 2024-08-12 ENCOUNTER — OFFICE VISIT (OUTPATIENT)
Dept: INTERNAL MEDICINE CLINIC | Facility: CLINIC | Age: 84
End: 2024-08-12
Payer: MEDICARE

## 2024-08-12 VITALS
DIASTOLIC BLOOD PRESSURE: 56 MMHG | TEMPERATURE: 98 F | HEART RATE: 69 BPM | SYSTOLIC BLOOD PRESSURE: 108 MMHG | BODY MASS INDEX: 23.77 KG/M2 | OXYGEN SATURATION: 96 % | RESPIRATION RATE: 20 BRPM | WEIGHT: 139.25 LBS | HEIGHT: 64 IN

## 2024-08-12 DIAGNOSIS — R16.0 LIVER MASS: ICD-10-CM

## 2024-08-12 DIAGNOSIS — F41.9 ANXIETY: ICD-10-CM

## 2024-08-12 DIAGNOSIS — I10 PRIMARY HYPERTENSION: Primary | ICD-10-CM

## 2024-08-12 PROCEDURE — 99214 OFFICE O/P EST MOD 30 MIN: CPT | Performed by: INTERNAL MEDICINE

## 2024-08-12 RX ORDER — ALPRAZOLAM 0.25 MG/1
TABLET ORAL
Qty: 1 TABLET | Refills: 0 | Status: SHIPPED | OUTPATIENT
Start: 2024-08-12

## 2024-08-12 RX ORDER — CYCLOSPORINE 0.5 MG/ML
1 EMULSION OPHTHALMIC EVERY 12 HOURS
COMMUNITY
Start: 2024-05-22

## 2024-08-12 NOTE — PROGRESS NOTES
Subjective:   Patient ID: Karolina Contreras is a 83 year old female.    /56 (BP Location: Right arm, Patient Position: Sitting, Cuff Size: adult)   Pulse 69   Temp 97.9 °F (36.6 °C) (Temporal)   Resp 20   Ht 5' 4\" (1.626 m)   Wt 139 lb 4 oz (63.2 kg)   LMP  (LMP Unknown)   SpO2 96%   BMI 23.90 kg/m²     HPI  Here for fu on htn, still hasn't done MRI and we discussed the importance at length for liver mass, she is concerned about the mri, discussed xanax before and she states she will now do it, discussed importance of being driven to and from, she understands, she states she still has some anxiety and has taken some meds she has at home, she will my chart the name today, occ le ache, not resolved with stopping statin for 2 weeks, she plans to discuss with her rheum  History/Other:   Review of Systems   Constitutional:  Negative for fever.   Respiratory:  Negative for shortness of breath.    Cardiovascular:  Negative for chest pain.   Endocrine: Negative for polyuria.   Neurological:  Negative for headaches.     Current Outpatient Medications   Medication Sig Dispense Refill    cycloSPORINE 0.05 % Ophthalmic Emulsion Place 1 drop into both eyes every 12 (twelve) hours.      metoprolol succinate ER 25 MG Oral Tablet 24 Hr Take 1 tablet (25 mg total) by mouth daily. 90 tablet 3    NON FORMULARY Unknown anti anxiety meds      Irbesartan 300 MG Oral Tab Take 1 tablet (300 mg total) by mouth nightly. 90 tablet 3    Lifitegrast (XIIDRA) 5 % Ophthalmic Solution one drop both eyes Ophthalmic Twice a day for 90 days      rosuvastatin 10 MG Oral Tab Take 1 tablet (10 mg total) by mouth daily. 90 tablet 3    aspirin 81 MG Oral Tab EC Take 1 tablet (81 mg total) by mouth daily.  0    magnesium 30 MG Oral Tab Take 1 tablet (30 mg total) by mouth twice a week.      Ascorbic Acid (VITAMIN C) 1000 MG Oral Tab Take 1 tablet (1,000 mg total) by mouth daily.      vitamin B-12 50 MCG Oral Tab Take 1 tablet (50 mcg  total) by mouth daily.      Multiple Vitamins-Minerals (OCUVITE EXTRA) Oral Tab Take 2 tablets by mouth daily.      acetaminophen 325 MG Oral Tab Take 1 tablet (325 mg total) by mouth every 6 (six) hours as needed for Pain.      Cholecalciferol (VITAMIN D) 1000 UNIT Oral Cap Take 5,000 Units by mouth. Patient's daughter reports patient is taking Vitamin D 2000 units every other day.       CENTRUM SILVER OR TABS every other day       Allergies:No Known Allergies    Objective:   Physical Exam  Constitutional:       Appearance: Normal appearance.   Cardiovascular:      Rate and Rhythm: Regular rhythm.      Heart sounds: Normal heart sounds.   Pulmonary:      Breath sounds: Normal breath sounds.   Skin:     General: Skin is warm.   Neurological:      Mental Status: She is alert and oriented to person, place, and time.         Assessment & Plan:   1. Primary hypertension -bp at goal   2. Liver mass -mri asap, xanax 30 min before   3. Anxiety she will call back with med name   [facility] in feb for awv    No orders of the defined types were placed in this encounter.      Meds This Visit:  Requested Prescriptions     Pending Prescriptions Disp Refills    ALPRAZolam (XANAX) 0.25 MG Oral Tab 1 tablet 0     Sig: Take 30 min before MRI       Imaging & Referrals:  None

## 2024-08-13 RX ORDER — ESCITALOPRAM OXALATE 10 MG/1
10 TABLET ORAL DAILY
Qty: 90 TABLET | Refills: 1 | Status: SHIPPED | OUTPATIENT
Start: 2024-08-13

## 2024-08-13 NOTE — TELEPHONE ENCOUNTER
From: Karolina Contreras  To: Ayad Chowdhury  Sent: 8/12/2024 11:34 AM CDT  Subject: Eschitolopram (lexaprol Tabs) 10 mg    This is the medicine presribed earlier. Please order.

## 2024-08-19 ENCOUNTER — OFFICE VISIT (OUTPATIENT)
Dept: HEMATOLOGY/ONCOLOGY | Facility: HOSPITAL | Age: 84
End: 2024-08-19
Attending: INTERNAL MEDICINE
Payer: MEDICARE

## 2024-08-19 VITALS
HEART RATE: 83 BPM | WEIGHT: 140.63 LBS | BODY MASS INDEX: 24 KG/M2 | SYSTOLIC BLOOD PRESSURE: 143 MMHG | RESPIRATION RATE: 18 BRPM | DIASTOLIC BLOOD PRESSURE: 62 MMHG | OXYGEN SATURATION: 93 % | TEMPERATURE: 97 F

## 2024-08-19 DIAGNOSIS — D63.8 ANEMIA OF CHRONIC DISEASE: Primary | ICD-10-CM

## 2024-08-19 DIAGNOSIS — M35.3 PMR (POLYMYALGIA RHEUMATICA) (HCC): ICD-10-CM

## 2024-08-19 LAB
ALBUMIN SERPL-MCNC: 4.5 G/DL (ref 3.2–4.8)
ALBUMIN/GLOB SERPL: 1.6 {RATIO} (ref 1–2)
ALP LIVER SERPL-CCNC: 55 U/L
ALT SERPL-CCNC: 14 U/L
ANION GAP SERPL CALC-SCNC: 8 MMOL/L (ref 0–18)
AST SERPL-CCNC: 21 U/L (ref ?–34)
BASOPHILS # BLD AUTO: 0.04 X10(3) UL (ref 0–0.2)
BASOPHILS NFR BLD AUTO: 0.6 %
BILIRUB SERPL-MCNC: 1.3 MG/DL (ref 0.2–1.1)
BUN BLD-MCNC: 18 MG/DL (ref 9–23)
CALCIUM BLD-MCNC: 9.9 MG/DL (ref 8.7–10.4)
CHLORIDE SERPL-SCNC: 104 MMOL/L (ref 98–112)
CO2 SERPL-SCNC: 25 MMOL/L (ref 21–32)
CREAT BLD-MCNC: 0.74 MG/DL
DEPRECATED HBV CORE AB SER IA-ACNC: 482.9 NG/ML
EGFRCR SERPLBLD CKD-EPI 2021: 80 ML/MIN/1.73M2 (ref 60–?)
EOSINOPHIL # BLD AUTO: 0.39 X10(3) UL (ref 0–0.7)
EOSINOPHIL NFR BLD AUTO: 6 %
ERYTHROCYTE [DISTWIDTH] IN BLOOD BY AUTOMATED COUNT: 23.9 %
GLOBULIN PLAS-MCNC: 2.8 G/DL (ref 2–3.5)
GLUCOSE BLD-MCNC: 119 MG/DL (ref 70–99)
HCT VFR BLD AUTO: 28.6 %
HGB BLD-MCNC: 9.5 G/DL
IMM GRANULOCYTES # BLD AUTO: 0.01 X10(3) UL (ref 0–1)
IMM GRANULOCYTES NFR BLD: 0.2 %
IRON SATN MFR SERPL: 37 %
IRON SERPL-MCNC: 117 UG/DL
LYMPHOCYTES # BLD AUTO: 2.43 X10(3) UL (ref 1–4)
LYMPHOCYTES NFR BLD AUTO: 37.4 %
MCH RBC QN AUTO: 32.3 PG (ref 26–34)
MCHC RBC AUTO-ENTMCNC: 33.2 G/DL (ref 31–37)
MCV RBC AUTO: 97.3 FL
MONOCYTES # BLD AUTO: 0.48 X10(3) UL (ref 0.1–1)
MONOCYTES NFR BLD AUTO: 7.4 %
NEUTROPHILS # BLD AUTO: 3.15 X10 (3) UL (ref 1.5–7.7)
NEUTROPHILS # BLD AUTO: 3.15 X10(3) UL (ref 1.5–7.7)
NEUTROPHILS NFR BLD AUTO: 48.4 %
OSMOLALITY SERPL CALC.SUM OF ELEC: 287 MOSM/KG (ref 275–295)
PLATELET # BLD AUTO: 326 10(3)UL (ref 150–450)
PLATELET MORPHOLOGY: NORMAL
POTASSIUM SERPL-SCNC: 4.1 MMOL/L (ref 3.5–5.1)
PROT SERPL-MCNC: 7.3 G/DL (ref 5.7–8.2)
RBC # BLD AUTO: 2.94 X10(6)UL
SODIUM SERPL-SCNC: 137 MMOL/L (ref 136–145)
TOTAL IRON BINDING CAPACITY: 313 UG/DL (ref 250–425)
TRANSFERRIN SERPL-MCNC: 232 MG/DL (ref 250–380)
WBC # BLD AUTO: 6.5 X10(3) UL (ref 4–11)

## 2024-08-19 PROCEDURE — 83550 IRON BINDING TEST: CPT | Performed by: INTERNAL MEDICINE

## 2024-08-19 PROCEDURE — 83540 ASSAY OF IRON: CPT | Performed by: INTERNAL MEDICINE

## 2024-08-19 PROCEDURE — 85025 COMPLETE CBC W/AUTO DIFF WBC: CPT | Performed by: INTERNAL MEDICINE

## 2024-08-19 PROCEDURE — 80053 COMPREHEN METABOLIC PANEL: CPT | Performed by: INTERNAL MEDICINE

## 2024-08-19 PROCEDURE — 36415 COLL VENOUS BLD VENIPUNCTURE: CPT

## 2024-08-19 PROCEDURE — 82728 ASSAY OF FERRITIN: CPT | Performed by: INTERNAL MEDICINE

## 2024-08-19 PROCEDURE — 96372 THER/PROPH/DIAG INJ SC/IM: CPT

## 2024-08-19 NOTE — PROGRESS NOTES
Education Record    Learner:  Patient    Disease / Diagnosis: Anemia of CKD    Barriers / Limitations:  None   Comments:    Method:  Brief focused and Reinforcement   Comments:    General Topics:  Plan of care reviewed   Comments:    Outcome:  Shows understanding   Comments:    Hgb 9.5, Retacrit given.

## 2024-08-19 NOTE — PROGRESS NOTES
Cancer Center Progress Note  Patient Name: Karolina Contreras   YOB: 1940   Medical Record Number: [id number]   [facility]: [id number]   Attending Physician: Marco A Wilcox M.D.       Date of Visit: 8/19/2024       Chief Complaint:  Chief Complaint   Patient presents with    Follow - Up        Oncologic History:  Karolina Contreras is a 83 year old female referred by Dr. Chowdhury for evaluation of her chronic anemia.  The patient reports that she has been anemic for quite some time. She has a long history of weakness and pain in the BLE.  She reports that she has trouble, at times, rising from a seated position.  She also reports that the joints of her fingers swell intermittently. She was seen by Rheumatology, Dr. Braun in the past and was diagnosed with Polymyalgia rheumatica.  She was treated with steroids and then MTX.  She reports that these medications made her feel \"bad all over\". She barnard in [place] and was seen by a rheumatologist there and told that she did not have PMR.  The medications were discontinued. She has had frequent elevations of her ESR, ranging from  in our system, but reports a normal ESR in [place]. Consultation at the [facility] was suggested, but the referral was declined.  She was seen by [facility] in 2015.  The etiology of her anemia, at that time, was not clear. She had an elevated ferritin and was screened for hemochromatosis, but carries only one gene, which does not typically cause clinical hemochromatosis.  Observation was recommended and her hgb remained stable.  She was then seen by hematology in [place], and numerous blood tests, including an SPEP, B12, Folate, iron studies, Retic ct and haptoglobin were reportedly unrevealing.  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       No F/C/NS. No palpable LAD. No wt loss.      No  family history of anemia.    She was seen for an evaluation at the [facility]. The hematologic diagnosis of anemia of chronic illness was confirmed. They agreed with not starting supplemental BHASKAR, as her hgb was 9.5 there.      History of Present Illness:  Pt is here for follow up. She feels well. It has been 3 weeks since her last injection.     Performance Status:  ECOG 1    Past Medical History:  Past Medical History:    ANEMIA    Anemia    Anemia, unspecified    Anorexia    Anxiety    Arthritis    Back pain    Benign neoplasm of colon    Diarrhea, unspecified    Eye disease    Fatigue    Heart palpitations    History of cardiac murmur    History of depression    Lung nodule    Seen on [facility]     Normal cardiac stress test    at [facility]    Osteopenia    Mild.  Dexa scan 4/30/15. Repeat 2 years.    Other and unspecified hyperlipidemia    Other seborrheic keratosis    Pain in joints    Palpitations    PMR (polymyalgia rheumatica) (HCC)    Sicca syndrome (HCC)    Skin lesion    Sputum production    Stress    Unspecified essential hypertension    Wears glasses    Weight loss       Past Surgical History:  Past Surgical History:   Procedure Laterality Date    Colonoscopy  10/5/06    rectal hyperplastic polyp, diverticulosis, hemorrhoids    Colonoscopy,diagnostic  11/29/12    sigmoid polyp, diverticulosis    Other surgical history      Parotid duct divert,exc submand         Family History:  Family History   Problem Relation Age of Onset    Cancer Father 63        colon @63    Colon Cancer Father         66    Other (Other) Mother         osteoporosis    Other (Other) Sister         osteoporosis    Heart Disorder Maternal Grandmother         MI       Social History:  Social History     Socioeconomic History    Marital status:      Spouse name: Not on file    Number of children: Not on file    Years of education: Not on file    Highest education level: Not on file   Occupational History    Not on file    Tobacco Use    Smoking status: Never    Smokeless tobacco: Never   Vaping Use    Vaping status: Never Used   Substance and Sexual Activity    Alcohol use: No     Alcohol/week: 0.0 standard drinks of alcohol    Drug use: No    Sexual activity: Not Currently   Other Topics Concern     Service Not Asked    Blood Transfusions Not Asked    Caffeine Concern Yes     Comment: 0-1 cups daily    Occupational Exposure Not Asked    Hobby Hazards Not Asked    Sleep Concern Not Asked    Stress Concern Not Asked    Weight Concern Not Asked    Special Diet Not Asked    Back Care Not Asked    Exercise No    Bike Helmet Not Asked    Seat Belt Not Asked    Self-Exams Not Asked   Social History Narrative    : 1966    Children: 2, misscarriage times 2    Exercise: walks    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[employer]     Caffeine intake: 1-2 [employer] coffee/d             Social Determinants of Health     Financial Resource Strain: Low Risk  (8/11/2021)    Received from [facility]    Overall Financial Resource Strain (CARDIA)     Difficulty of Paying Living Expenses: Not very hard     : Not on file     : Not on file     : Not on file     : Not on file     : Not on file   Food Insecurity: No Food Insecurity (8/11/2021)    Received from [facility]    Hunger Vital Sign     Worried About Running Out of Food in the Last Year: Never true     Ran Out of Food in the Last Year: Never true     : Not on file     : Not on file     : Not on file     : Not on file   Transportation Needs: No Transportation Needs (8/11/2021)    Received from [facility]    PRAPARE - Transportation     Lack of Transportation (Medical): No     Lack of Transportation (Non-Medical): No     : Not on file     : Not on file     : Not on file     : Not on file   Physical Activity: Inactive (8/11/2021)    Received from [facility]    Exercise Vital Sign     Days of Exercise per Week: 0 days     Minutes of Exercise per Session: 0 min     : Not on file     : Not on file     : Not on  file     : Not on file   Stress: No Stress Concern Present (8/11/2021)    Received from [facility]    [employer] - Occupational Stress Questionnaire     Feeling of Stress : Only a little     : Not on file     : Not on file     : Not on file     : Not on file     : Not on file   Social Connections: Socially Integrated (8/11/2021)    Received from [facility]    Social Connection and Isolation Panel [NHANES]     Frequency of Communication with Friends and Family: More than three times a week     Frequency of Social Gatherings with Friends and Family: Not on file     Attends Gnosticist Services: More than 4 times per year     Active Member of Clubs or Organizations: Yes     Attends Club or Organization Meetings: 1 to 4 times per year     Marital Status:    Housing Stability: Low Risk  (8/11/2021)    Received from [facility]    Housing Stability Vital Sign     Unable to Pay for Housing in the Last Year: No     In the last 12 months, how many places have you lived?: 1     In the last 12 months, was there a time when you did not have a steady place to sleep or slept in a shelter (including now)?: No       Current Medications:    Current Outpatient Medications:     escitalopram (LEXAPRO) 10 MG Oral Tab, Take 1 tablet (10 mg total) by mouth daily., Disp: 90 tablet, Rfl: 1    cycloSPORINE 0.05 % Ophthalmic Emulsion, Place 1 drop into both eyes every 12 (twelve) hours., Disp: , Rfl:     ALPRAZolam (XANAX) 0.25 MG Oral Tab, Take 30 min before MRI, Disp: 1 tablet, Rfl: 0    metoprolol succinate ER 25 MG Oral Tablet 24 Hr, Take 1 tablet (25 mg total) by mouth daily., Disp: 90 tablet, Rfl: 3    NON FORMULARY, Unknown anti anxiety meds, Disp: , Rfl:     Irbesartan 300 MG Oral Tab, Take 1 tablet (300 mg total) by mouth nightly., Disp: 90 tablet, Rfl: 3    Lifitegrast (XIIDRA) 5 % Ophthalmic Solution, one drop both eyes Ophthalmic Twice a day for 90 days, Disp: , Rfl:     rosuvastatin 10 MG Oral  Tab, Take 1 tablet (10 mg total) by mouth daily., Disp: 90 tablet, Rfl: 3    aspirin 81 MG Oral Tab EC, Take 1 tablet (81 mg total) by mouth daily., Disp: , Rfl: 0    magnesium 30 MG Oral Tab, Take 1 tablet (30 mg total) by mouth twice a week., Disp: , Rfl:     Ascorbic Acid (VITAMIN C) 1000 MG Oral Tab, Take 1 tablet (1,000 mg total) by mouth daily., Disp: , Rfl:     vitamin B-12 50 MCG Oral Tab, Take 1 tablet (50 mcg total) by mouth daily., Disp: , Rfl:     Multiple Vitamins-Minerals (OCUVITE EXTRA) Oral Tab, Take 2 tablets by mouth daily., Disp: , Rfl:     acetaminophen 325 MG Oral Tab, Take 1 tablet (325 mg total) by mouth every 6 (six) hours as needed for Pain., Disp: , Rfl:     Cholecalciferol (VITAMIN D) 1000 UNIT Oral Cap, Take 5,000 Units by mouth. Patient's daughter reports patient is taking Vitamin D 2000 units every other day. , Disp: , Rfl:     CENTRUM SILVER OR TABS, every other day, Disp: , Rfl:     Allergies:  No Known Allergies     Review of Systems:    Constitutional No fevers, chills, night sweats, excessive fatigue or weight loss.   Eyes No significant visual difficulties. No diplopia. No yellowing.   Hematologic/Lymphatic Normal - No easy bruising or bleeding.  No tender or palpable lymph nodes.   Respiratory No dyspnea, pleuritic chest pain, cough or hemoptysis.   Cardiovascular No anginal chest pain, palpitations or orthopnea.   Gastrointestinal No nausea, vomiting, diarrhea, GI bleeding, or constipation. NL appetite.   Genitorurinary  No hematuria, dysuria, abnormal bleeding, or incontinence.   Integumentary No rashes or yellowing of the skin   Neurologic No headache, blurred vision, and no areas of focal weakness. Normal gait.   Psychiatric No insomnia, depression, martin or mood swings.         Vital Signs:  /62 (BP Location: Right arm, Patient Position: Sitting, Cuff Size: adult)   Pulse 83   Temp 96.9 °F (36.1 °C) (Temporal)   Resp 18   Wt 63.8 kg (140 lb 9.6 oz)   LMP  (LMP  Unknown)   SpO2 93%   BMI 24.13 kg/m²     Physical Examination:    Constitutional Normal - Mood and affect appropriate. Appears close to chronological age. Well nourished. Well developed.   Eyes Normal - Conjunctivae and sclerae are clear and without icterus. Pupils are reactive and equal.   Hematologic/Lymphatic Normal - No petechiae or purpura.  No tender or palpable lymph nodes in the cervical, supraclavicular, axillary or inguinal area.   Respiratory Normal - Lungs are clear to auscultation, no wheezing.   Cardiovascular Normal - Regular rate and rhythm, no murmurs.   Abdomen Normal - Non-tender, non-distended, no masses, ascites or hepatosplenomegaly.    Extremities Normal - No cyanosis, clubbing or edema.    Integumentary Normal - No rashes and noJaundice   Neurologic Normal - No sensory or motor deficits, normal cerebellar function, normal gait, cranial nerves intact.   Psychiatric Normal - A&Ox3. Coherent speech. Verbalizes understanding of our discussions today.       Lab:  Recent Labs     08/19/24  0930   RBC 2.94 L   HGB 9.5 L   HCT 28.6 L   MCV 97.3   MCH 32.3   MCHC 33.2   RDW 23.9   NEPRELIM 3.15   WBC 6.5   .0     Recent Labs     08/19/24  0930    H   BUN 18   CREATSERUM 0.74   CA 9.9   ALB 4.5      K 4.1      CO2 25.0   ALKPHO 55   AST 21   ALT 14   BILT 1.3 H   TP 7.3       Radiology:    Pathology:    Impression and Plan:  Anemia of chronic illness: With steroid treatment, her hgb returned to 10-10.5, which has been her baseline for the past few years. 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 Her hgb drifted down to the point where she would benefit from Retacrit to improve her quality of life. Her iron stores were replete. She is tolerating 30,000 units q2 weeks with a hgb 9.5-10.5. Will recheck her iron stores. Increase the injection interval to q3 weeks.     Polymyalgia rheumatica and Sjogrens: Management per rheumatology. She has been weaned from her  steroids    Planned Follow Up:  3 months    Electronically Signed by:    [facility]

## 2024-09-09 ENCOUNTER — APPOINTMENT (OUTPATIENT)
Dept: HEMATOLOGY/ONCOLOGY | Facility: HOSPITAL | Age: 84
End: 2024-09-09
Attending: INTERNAL MEDICINE
Payer: MEDICARE

## 2024-09-10 ENCOUNTER — OFFICE VISIT (OUTPATIENT)
Dept: HEMATOLOGY/ONCOLOGY | Facility: HOSPITAL | Age: 84
End: 2024-09-10
Attending: INTERNAL MEDICINE
Payer: MEDICARE

## 2024-09-10 VITALS
OXYGEN SATURATION: 95 % | RESPIRATION RATE: 16 BRPM | TEMPERATURE: 98 F | DIASTOLIC BLOOD PRESSURE: 61 MMHG | HEART RATE: 70 BPM | HEIGHT: 64.02 IN | BODY MASS INDEX: 24.07 KG/M2 | WEIGHT: 141 LBS | SYSTOLIC BLOOD PRESSURE: 161 MMHG

## 2024-09-10 DIAGNOSIS — D63.8 ANEMIA OF CHRONIC DISEASE: Primary | ICD-10-CM

## 2024-09-10 LAB — HGB BLD-MCNC: 9.3 G/DL

## 2024-09-10 PROCEDURE — 96372 THER/PROPH/DIAG INJ SC/IM: CPT

## 2024-09-10 PROCEDURE — 85018 HEMOGLOBIN: CPT

## 2024-09-10 PROCEDURE — 36415 COLL VENOUS BLD VENIPUNCTURE: CPT

## 2024-09-10 NOTE — PROGRESS NOTES
Education Record    Learner:  Patient    Disease / Diagnosis:Anemia of chronic disease      Barriers / Limitations:  None   Comments:    Method:  Brief focused   Comments:    General Topics:  Diet, Infection, Medication, Pain, Precautions, Procedure, Side effects and symptom management, Plan of care reviewed, and Fall risk and prevention   Comments:    Outcome:  Shows understanding   Comments:    Hg=9.3 ...Recived Retacrit injection

## 2024-09-30 ENCOUNTER — OFFICE VISIT (OUTPATIENT)
Dept: HEMATOLOGY/ONCOLOGY | Facility: HOSPITAL | Age: 84
End: 2024-09-30
Attending: INTERNAL MEDICINE
Payer: MEDICARE

## 2024-09-30 VITALS
TEMPERATURE: 98 F | DIASTOLIC BLOOD PRESSURE: 49 MMHG | HEART RATE: 75 BPM | RESPIRATION RATE: 18 BRPM | SYSTOLIC BLOOD PRESSURE: 137 MMHG | OXYGEN SATURATION: 95 %

## 2024-09-30 DIAGNOSIS — D63.8 ANEMIA OF CHRONIC DISEASE: Primary | ICD-10-CM

## 2024-09-30 LAB — HGB BLD-MCNC: 8.6 G/DL

## 2024-09-30 PROCEDURE — 96372 THER/PROPH/DIAG INJ SC/IM: CPT

## 2024-09-30 PROCEDURE — 36415 COLL VENOUS BLD VENIPUNCTURE: CPT

## 2024-09-30 PROCEDURE — 85018 HEMOGLOBIN: CPT

## 2024-09-30 NOTE — PROGRESS NOTES
Education Record    Learner:  Patient    Disease / Diagnosis: Anemia    Barriers / Limitations:  None   Comments:    Method:  Brief focused and Reinforcement   Comments:    General Topics:  Plan of care reviewed   Comments:    Outcome:  Shows understanding   Comments:    Hgb 8.6, Retacrit given. Patient tolerated and discharged in stable condition.

## 2024-10-21 ENCOUNTER — OFFICE VISIT (OUTPATIENT)
Dept: HEMATOLOGY/ONCOLOGY | Facility: HOSPITAL | Age: 84
End: 2024-10-21
Attending: INTERNAL MEDICINE
Payer: MEDICARE

## 2024-10-21 VITALS
OXYGEN SATURATION: 95 % | DIASTOLIC BLOOD PRESSURE: 90 MMHG | TEMPERATURE: 98 F | RESPIRATION RATE: 18 BRPM | HEART RATE: 65 BPM | SYSTOLIC BLOOD PRESSURE: 153 MMHG

## 2024-10-21 DIAGNOSIS — D63.8 ANEMIA OF CHRONIC DISEASE: Primary | ICD-10-CM

## 2024-10-21 LAB — HGB BLD-MCNC: 9.1 G/DL

## 2024-10-21 PROCEDURE — 96372 THER/PROPH/DIAG INJ SC/IM: CPT

## 2024-10-21 PROCEDURE — 85018 HEMOGLOBIN: CPT

## 2024-10-21 PROCEDURE — 36415 COLL VENOUS BLD VENIPUNCTURE: CPT

## 2024-10-21 NOTE — PROGRESS NOTES
Education Record    Learner:  Patient    Pt here for: Retacrit injection    Barriers / Limitations:  None    Method:  Brief focused, printed material and  reinforcement    General Topics:  Plan of care reviewed    Outcome: Pt tolerated injection with no c/o. Left in stable condition. Appts in place.

## 2024-11-11 ENCOUNTER — OFFICE VISIT (OUTPATIENT)
Dept: HEMATOLOGY/ONCOLOGY | Facility: HOSPITAL | Age: 84
End: 2024-11-11
Attending: INTERNAL MEDICINE
Payer: MEDICARE

## 2024-11-11 VITALS
HEART RATE: 64 BPM | HEIGHT: 64.02 IN | SYSTOLIC BLOOD PRESSURE: 148 MMHG | TEMPERATURE: 98 F | DIASTOLIC BLOOD PRESSURE: 65 MMHG | RESPIRATION RATE: 16 BRPM | OXYGEN SATURATION: 96 % | BODY MASS INDEX: 23.73 KG/M2 | WEIGHT: 139 LBS

## 2024-11-11 DIAGNOSIS — M35.3 PMR (POLYMYALGIA RHEUMATICA) (HCC): ICD-10-CM

## 2024-11-11 DIAGNOSIS — D63.8 ANEMIA OF CHRONIC DISEASE: Primary | ICD-10-CM

## 2024-11-11 LAB
BASOPHILS # BLD AUTO: 0.05 X10(3) UL (ref 0–0.2)
BASOPHILS NFR BLD AUTO: 0.7 %
EOSINOPHIL # BLD AUTO: 0.5 X10(3) UL (ref 0–0.7)
EOSINOPHIL NFR BLD AUTO: 7.2 %
ERYTHROCYTE [DISTWIDTH] IN BLOOD BY AUTOMATED COUNT: 24.4 %
HCT VFR BLD AUTO: 27.6 %
HGB BLD-MCNC: 9.3 G/DL
IMM GRANULOCYTES # BLD AUTO: 0.02 X10(3) UL (ref 0–1)
IMM GRANULOCYTES NFR BLD: 0.3 %
LYMPHOCYTES # BLD AUTO: 2.39 X10(3) UL (ref 1–4)
LYMPHOCYTES NFR BLD AUTO: 34.3 %
MCH RBC QN AUTO: 33.9 PG (ref 26–34)
MCHC RBC AUTO-ENTMCNC: 33.7 G/DL (ref 31–37)
MCV RBC AUTO: 100.7 FL
MONOCYTES # BLD AUTO: 0.66 X10(3) UL (ref 0.1–1)
MONOCYTES NFR BLD AUTO: 9.5 %
NEUTROPHILS # BLD AUTO: 3.35 X10 (3) UL (ref 1.5–7.7)
NEUTROPHILS # BLD AUTO: 3.35 X10(3) UL (ref 1.5–7.7)
NEUTROPHILS NFR BLD AUTO: 48 %
PLATELET # BLD AUTO: 323 10(3)UL (ref 150–450)
PLATELET MORPHOLOGY: NORMAL
RBC # BLD AUTO: 2.74 X10(6)UL
WBC # BLD AUTO: 7 X10(3) UL (ref 4–11)

## 2024-11-11 PROCEDURE — 96372 THER/PROPH/DIAG INJ SC/IM: CPT

## 2024-11-11 PROCEDURE — 85025 COMPLETE CBC W/AUTO DIFF WBC: CPT | Performed by: INTERNAL MEDICINE

## 2024-11-11 PROCEDURE — 36415 COLL VENOUS BLD VENIPUNCTURE: CPT

## 2024-11-11 NOTE — PROGRESS NOTES
Cancer Center Progress Note  Patient Name: Karolina Contreras   YOB: 1940   Medical Record Number: [id number]   [facility]: [id number]   Attending Physician: Marco A Wilcox M.D.       Date of Visit: 11/11/2024         Chief Complaint:  Chief Complaint   Patient presents with    Follow - Up        Oncologic History:  Karolina Contreras is a 84 year old female referred by Dr. Chowdhury for evaluation of her chronic anemia.  The patient reports that she has been anemic for quite some time. She has a long history of weakness and pain in the BLE.  She reports that she has trouble, at times, rising from a seated position.  She also reports that the joints of her fingers swell intermittently. She was seen by Rheumatology, Dr. Braun in the past and was diagnosed with Polymyalgia rheumatica.  She was treated with steroids and then MTX.  She reports that these medications made her feel \"bad all over\". She barnard in [place] and was seen by a rheumatologist there and told that she did not have PMR.  The medications were discontinued. She has had frequent elevations of her ESR, ranging from  in our system, but reports a normal ESR in [place]. Consultation at the [facility] [place] [facility] was suggested, but the referral was declined.  She was seen by [facility] hematology in 2015.  The etiology of her anemia, at that time, was not clear. She had an elevated ferritin and was screened for hemochromatosis, but carries only one gene, which does not typically cause clinical hemochromatosis.  Observation was recommended and her hgb remained stable.  She was then seen by hematology in [place], and numerous blood tests, including an SPEP, B12, Folate, iron studies, Retic ct and haptoglobin were reportedly unrevealing.  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       No F/C/NS. No palpable LAD. No wt loss.      No  family history of anemia.    She was seen for an evaluation at the [facility]. The hematologic diagnosis of anemia of chronic illness was confirmed. They agreed with not starting supplemental BHASKAR, as her hgb was 9.5 there.      History of Present Illness:  Pt is here for follow up. She feels tired, but the same. It has been 3 weeks since her last injection.     Performance Status:  ECOG 1    Past Medical History:  Past Medical History:    ANEMIA    Anemia    Anemia, unspecified    Anorexia    Anxiety    Arthritis    Back pain    Benign neoplasm of colon    Diarrhea, unspecified    Eye disease    Fatigue    Heart palpitations    History of cardiac murmur    History of depression    Lung nodule    Seen on [facility]     Normal cardiac stress test    at [facility]    Osteopenia    Mild.  Dexa scan 4/30/15. Repeat 2 years.    Other and unspecified hyperlipidemia    Other seborrheic keratosis    Pain in joints    Palpitations    PMR (polymyalgia rheumatica) (HCC)    Sicca syndrome (HCC)    Skin lesion    Sputum production    Stress    Unspecified essential hypertension    Wears glasses    Weight loss       Past Surgical History:  Past Surgical History:   Procedure Laterality Date    Colonoscopy  10/5/06    rectal hyperplastic polyp, diverticulosis, hemorrhoids    Colonoscopy,diagnostic  11/29/12    sigmoid polyp, diverticulosis    Other surgical history      Parotid duct divert,exc submand         Family History:  Family History   Problem Relation Age of Onset    Cancer Father 63        colon @63    Colon Cancer Father         66    Other (Other) Mother         osteoporosis    Other (Other) Sister         osteoporosis    Heart Disorder Maternal Grandmother         MI       Social History:  Social History     Socioeconomic History    Marital status:      Spouse name: Not on file    Number of children: Not on file    Years of education: Not on file    Highest education level: Not on file   Occupational History    Not  on file   Tobacco Use    Smoking status: Never    Smokeless tobacco: Never   Vaping Use    Vaping status: Never Used   Substance and Sexual Activity    Alcohol use: No     Alcohol/week: 0.0 standard drinks of alcohol    Drug use: No    Sexual activity: Not Currently   Other Topics Concern     Service Not Asked    Blood Transfusions Not Asked    Caffeine Concern Yes     Comment: 0-1 cups daily    Occupational Exposure Not Asked    Hobby Hazards Not Asked    Sleep Concern Not Asked    Stress Concern Not Asked    Weight Concern Not Asked    Special Diet Not Asked    Back Care Not Asked    Exercise No    Bike Helmet Not Asked    Seat Belt Not Asked    Self-Exams Not Asked   Social History Narrative    : 1966    Children: 2, misscarriage times 2    Exercise: walks    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[employer]     Caffeine intake: 1-2 Greek coffee/d             Social Drivers of Health     Financial Resource Strain: Low Risk  (8/11/2021)    Received from [facility] [place] [facility]    Overall Financial Resource Strain (CARDIA)     Difficulty of Paying Living Expenses: Not very hard     : Not on file     : Not on file     : Not on file     : Not on file     : Not on file   Food Insecurity: No Food Insecurity (8/11/2021)    Received from [facility] [place] [facility]    Hunger Vital Sign     Worried About Running Out of Food in the Last Year: Never true     Ran Out of Food in the Last Year: Never true     : Not on file     : Not on file     : Not on file     : Not on file   Transportation Needs: No Transportation Needs (8/11/2021)    Received from [facility] [place] [facility]    PRAPARE - Transportation     Lack of Transportation (Medical): No     Lack of Transportation (Non-Medical): No     : Not on file     : Not on file     : Not on file     : Not on file   Physical Activity: Inactive (8/11/2021)    Received from [facility] [place] [facility]    Exercise Vital Sign     Days of Exercise per Week: 0 days     Minutes of Exercise per Session: 0 min     : Not on file     : Not on file     : Not  on file     : Not on file   Stress: No Stress Concern Present (8/11/2021)    Received from [facility]    [place] [employer] - Occupational Stress Questionnaire     Feeling of Stress : Only a little     : Not on file     : Not on file     : Not on file     : Not on file     : Not on file   Social Connections: Socially Integrated (8/11/2021)    Received from [facility]    Social Connection and Isolation Panel [NHANES]     Frequency of Communication with Friends and Family: More than three times a week     Frequency of Social Gatherings with Friends and Family: Not on file     Attends Jewish Services: More than 4 times per year     Active Member of Clubs or Organizations: Yes     Attends Club or Organization Meetings: 1 to 4 times per year     Marital Status:    Housing Stability: Low Risk  (8/11/2021)    Received from [facility] [place] [facility]    Housing Stability Vital Sign     Unable to Pay for Housing in the Last Year: No     In the last 12 months, how many places have you lived?: 1     In the last 12 months, was there a time when you did not have a steady place to sleep or slept in a shelter (including now)?: No       Current Medications:    Current Outpatient Medications:     escitalopram (LEXAPRO) 10 MG Oral Tab, Take 1 tablet (10 mg total) by mouth daily., Disp: 90 tablet, Rfl: 1    cycloSPORINE 0.05 % Ophthalmic Emulsion, Place 1 drop into both eyes every 12 (twelve) hours., Disp: , Rfl:     ALPRAZolam (XANAX) 0.25 MG Oral Tab, Take 30 min before MRI, Disp: 1 tablet, Rfl: 0    metoprolol succinate ER 25 MG Oral Tablet 24 Hr, Take 1 tablet (25 mg total) by mouth daily., Disp: 90 tablet, Rfl: 3    NON FORMULARY, Unknown anti anxiety meds, Disp: , Rfl:     Irbesartan 300 MG Oral Tab, Take 1 tablet (300 mg total) by mouth nightly., Disp: 90 tablet, Rfl: 3    Lifitegrast (XIIDRA) 5 % Ophthalmic Solution, one drop both eyes Ophthalmic Twice a day for 90 days, Disp: , Rfl:     rosuvastatin 10 MG  Oral Tab, Take 1 tablet (10 mg total) by mouth daily., Disp: 90 tablet, Rfl: 3    aspirin 81 MG Oral Tab EC, Take 1 tablet (81 mg total) by mouth daily., Disp: , Rfl: 0    magnesium 30 MG Oral Tab, Take 1 tablet (30 mg total) by mouth twice a week., Disp: , Rfl:     Ascorbic Acid (VITAMIN C) 1000 MG Oral Tab, Take 1 tablet (1,000 mg total) by mouth daily., Disp: , Rfl:     vitamin B-12 50 MCG Oral Tab, Take 1 tablet (50 mcg total) by mouth daily., Disp: , Rfl:     Multiple Vitamins-Minerals (OCUVITE EXTRA) Oral Tab, Take 2 tablets by mouth daily., Disp: , Rfl:     acetaminophen 325 MG Oral Tab, Take 1 tablet (325 mg total) by mouth every 6 (six) hours as needed for Pain., Disp: , Rfl:     Cholecalciferol (VITAMIN D) 1000 UNIT Oral Cap, Take 5,000 Units by mouth. Patient's daughter reports patient is taking Vitamin D 2000 units every other day. , Disp: , Rfl:     CENTRUM SILVER OR TABS, every other day, Disp: , Rfl:     Allergies:  No Known Allergies     Review of Systems:    Constitutional No fevers, chills, night sweats, excessive fatigue or weight loss.   Eyes No significant visual difficulties. No diplopia. No yellowing.   Hematologic/Lymphatic Normal - No easy bruising or bleeding.  No tender or palpable lymph nodes.   Respiratory No dyspnea, pleuritic chest pain, cough or hemoptysis.   Cardiovascular No anginal chest pain, palpitations or orthopnea.   Gastrointestinal No nausea, vomiting, diarrhea, GI bleeding, or constipation. NL appetite.   Genitorurinary  No hematuria, dysuria, abnormal bleeding, or incontinence.   Integumentary No rashes or yellowing of the skin   Neurologic No headache, blurred vision, and no areas of focal weakness. Normal gait.   Psychiatric No insomnia, depression, martin or mood swings.         Vital Signs:  /65 (BP Location: Left arm, Patient Position: Sitting, Cuff Size: adult)   Pulse 64   Temp 98 °F (36.7 °C) (Tympanic)   Resp 16   Ht 1.626 m (5' 4.02\")   Wt 63 kg (139  lb)   LMP  (LMP Unknown)   SpO2 96%   BMI 23.85 kg/m²     Physical Examination:    Constitutional Normal - Mood and affect appropriate. Appears close to chronological age. Well nourished. Well developed.   Eyes Normal - Conjunctivae and sclerae are clear and without icterus. Pupils are reactive and equal.   Hematologic/Lymphatic Normal - No petechiae or purpura.  No tender or palpable lymph nodes in the cervical, supraclavicular, axillary or inguinal area.   Respiratory Normal - Lungs are clear to auscultation, no wheezing.   Cardiovascular Normal - Regular rate and rhythm, no murmurs.   Abdomen Normal - Non-tender, non-distended, no masses, ascites or hepatosplenomegaly.    Extremities Normal - No cyanosis, clubbing or edema.    Integumentary Normal - No rashes and noJaundice   Neurologic Normal - No sensory or motor deficits, normal cerebellar function, normal gait, cranial nerves intact.   Psychiatric Normal - A&Ox3. Coherent speech. Verbalizes understanding of our discussions today.       Lab:  Recent Labs     11/11/24  0923   RBC 2.74 L   HGB 9.3 L   HCT 27.6 L   .7 H   MCH 33.9   MCHC 33.7   RDW 24.4   NEPRELIM 3.35   WBC 7.0   .0       Radiology:    Pathology:    Impression and Plan:  Anemia of chronic illness: With steroid treatment, her hgb returned to 10-10.5, which has been her baseline for the past few years. 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 Her hgb drifted down to the point where she would benefit from Retacrit to improve her quality of life. Her iron stores were replete. She is tolerating 30,000 units q3 weeks with a hgb 8.5-9.5. Will increase the injection dose to 40,000 units.     Polymyalgia rheumatica and Sjogrens: Management per rheumatology. She has been weaned from her steroids    Planned Follow Up:  3 months. Q3 week injection    The patient and I have a longitudinal relationship to address/treat this serious and complex condition      Electronically  Signed by:    Marco A Wilcox M.D.  [facility]

## 2024-11-11 NOTE — PROGRESS NOTES
Pt here for follow up and possible injection.  She complains of fatigue.    Outpatient Oncology Care Plan  Problem list:  knowledge deficit    Problems related to:    disease/disease progression    Interventions:  provided general teaching    Expected outcomes:  understands plan of care    Progress towards outcome:  making progress    Education Record    Learner:  Patient  Barriers / Limitations:  None  Method:  Brief focused  Outcome:  Shows understanding  Comments:

## 2024-11-20 DIAGNOSIS — Z00.00 ROUTINE GENERAL MEDICAL EXAMINATION AT A HEALTH CARE FACILITY: ICD-10-CM

## 2024-11-20 DIAGNOSIS — I10 PRIMARY HYPERTENSION: Primary | ICD-10-CM

## 2024-11-20 DIAGNOSIS — D64.9 ANEMIA, UNSPECIFIED TYPE: ICD-10-CM

## 2024-11-20 DIAGNOSIS — E78.2 MIXED HYPERLIPIDEMIA: ICD-10-CM

## 2024-12-02 ENCOUNTER — APPOINTMENT (OUTPATIENT)
Dept: HEMATOLOGY/ONCOLOGY | Facility: HOSPITAL | Age: 84
End: 2024-12-02
Attending: INTERNAL MEDICINE
Payer: MEDICARE

## 2024-12-02 NOTE — H&P
Informed consent:  Patient seen and examined independently. H and P reviewed. She has some chest tightness/SOB going up stairs. None at rest. Recent abnormal stress MPI reviewed. No other changes in H and P. Risks and benefits of procedure were discussed with patient. Airway examined.  Patient is ASA class 2 and Mallampati class 2. Pt is appropriate for conscious sedation. No history of difficult airway. The risks, benefits, indications and alternatives of left heart catheterization and coronary angigoraphy with possible percutaneous coronary intervention were discussed. The risks include, but are not limited to: bleeding, anemia requiring blood transfusion, allergic reaction, hypotension, infection, vascular injury, injury to upper or lower extremity requiring endovascular or open surgical repair,  kidney failure, stroke, cardiac or pulmonary artery perforation, pericardial effusion and tamponade requiring pericardiocentesis, myocardial infarction (heart attack), respiratory failure needing intubation, cardiac arrest, need for emergent surgery, and death. Benefits of the procedure include: symptomatic improvement, diagnosis of coronary artery disease or other forms of heart disease and possibly prevention of myocardial infarction. Alternatives to the procedure include: not performing cardiac catheterization, treatment with medications only, and observation. The patient and any accompanying family have considered the above, all questions have been answered to the best of my ability to their satisfaction, and have decided to proceed with the procedure. Appropriate candidate for Sedation/Analgesia: Yes. Plan for Sedation reviewed: Yes. Explained Anesthesia options and attendant risks, and have determined patient is an appropriate candidate. Yes. Consent for Sedation obtained: Yes. Patient reevaluated immediately prior to Sedation/Analgesia: Yes.

## 2024-12-02 NOTE — PAT NURSING NOTE
PreOp Instructions     You are scheduled for: a Cardiac Procedure     Date of Procedure: 12/04/24     Diet Instructions: Do not eat or drink anything after midnight including gum, mints, candy, etc.     Medications: Take Aspirin 81 mg x 4 tablets the day of your procedure, Medications you are allowed to take can be taken with a sip of water the morning of your procedure     Medications to Stop: Hold herbal supplements and vitamins morning of procedure     Skin Prep : Shower with antibacterial soap using a clean washcloth, prior to procedure. Once dried off, no lotions/powders/creams/ointments, etc.     Arrival Time: The day prior to your procedure you will receive a phone call before 6:00 pm with your arrival time. If you haven't received a phone call, please check your voicemail messages., If you did not receive a voice mail and it is after 6:00 pm, please call the nursing supervisor at [phone].    Driving After Procedure: Sedation will be given so you WILL NOT be able to drive home. You will need a responsible adult  to drive you home. You can NOT take uber or taxi unless approved by your physician in advance.     Discharge Teaching: Your nurse will give you specific instructions before discharge, Most people can resume normal activities in 2-3 days, Any questions, please call the physician's office

## 2024-12-03 ENCOUNTER — LAB ENCOUNTER (OUTPATIENT)
Dept: LAB | Facility: HOSPITAL | Age: 84
End: 2024-12-03
Attending: INTERNAL MEDICINE
Payer: MEDICARE

## 2024-12-03 ENCOUNTER — HOSPITAL ENCOUNTER (OUTPATIENT)
Dept: GENERAL RADIOLOGY | Facility: HOSPITAL | Age: 84
Discharge: HOME | End: 2024-12-03
Attending: INTERNAL MEDICINE
Payer: MEDICARE

## 2024-12-03 DIAGNOSIS — Z01.818 PRE-OP TESTING: ICD-10-CM

## 2024-12-03 LAB
ANION GAP SERPL CALC-SCNC: 5 MMOL/L (ref 0–18)
BASOPHILS # BLD AUTO: 0.05 X10(3) UL (ref 0–0.2)
BASOPHILS NFR BLD AUTO: 0.7 %
BUN BLD-MCNC: 20 MG/DL (ref 9–23)
CALCIUM BLD-MCNC: 9.8 MG/DL (ref 8.7–10.4)
CHLORIDE SERPL-SCNC: 106 MMOL/L (ref 98–112)
CHOLEST SERPL-MCNC: 133 MG/DL (ref ?–200)
CO2 SERPL-SCNC: 29 MMOL/L (ref 21–32)
CREAT BLD-MCNC: 0.71 MG/DL
EGFRCR SERPLBLD CKD-EPI 2021: 84 ML/MIN/1.73M2 (ref 60–?)
EOSINOPHIL # BLD AUTO: 0.49 X10(3) UL (ref 0–0.7)
EOSINOPHIL NFR BLD AUTO: 7.3 %
ERYTHROCYTE [DISTWIDTH] IN BLOOD BY AUTOMATED COUNT: 25.1 %
FASTING PATIENT LIPID ANSWER: YES
FASTING STATUS PATIENT QL REPORTED: YES
GLUCOSE BLD-MCNC: 88 MG/DL (ref 70–99)
HCT VFR BLD AUTO: 28 %
HDLC SERPL-MCNC: 60 MG/DL (ref 40–59)
HGB BLD-MCNC: 9.1 G/DL
IMM GRANULOCYTES # BLD AUTO: 0.02 X10(3) UL (ref 0–1)
IMM GRANULOCYTES NFR BLD: 0.3 %
LDLC SERPL CALC-MCNC: 62 MG/DL (ref ?–100)
LYMPHOCYTES # BLD AUTO: 2.34 X10(3) UL (ref 1–4)
LYMPHOCYTES NFR BLD AUTO: 34.7 %
MCH RBC QN AUTO: 33 PG (ref 26–34)
MCHC RBC AUTO-ENTMCNC: 32.5 G/DL (ref 31–37)
MCV RBC AUTO: 101.4 FL
MONOCYTES # BLD AUTO: 0.62 X10(3) UL (ref 0.1–1)
MONOCYTES NFR BLD AUTO: 9.2 %
NEUTROPHILS # BLD AUTO: 3.22 X10 (3) UL (ref 1.5–7.7)
NEUTROPHILS # BLD AUTO: 3.22 X10(3) UL (ref 1.5–7.7)
NEUTROPHILS NFR BLD AUTO: 47.8 %
NONHDLC SERPL-MCNC: 73 MG/DL (ref ?–130)
OSMOLALITY SERPL CALC.SUM OF ELEC: 292 MOSM/KG (ref 275–295)
PLATELET # BLD AUTO: 287 10(3)UL (ref 150–450)
PLATELET MORPHOLOGY: NORMAL
POTASSIUM SERPL-SCNC: 4.4 MMOL/L (ref 3.5–5.1)
RBC # BLD AUTO: 2.76 X10(6)UL
SODIUM SERPL-SCNC: 140 MMOL/L (ref 136–145)
TRIGL SERPL-MCNC: 46 MG/DL (ref 30–149)
VLDLC SERPL CALC-MCNC: 7 MG/DL (ref 0–30)
WBC # BLD AUTO: 6.7 X10(3) UL (ref 4–11)

## 2024-12-03 PROCEDURE — 36415 COLL VENOUS BLD VENIPUNCTURE: CPT

## 2024-12-03 PROCEDURE — 80061 LIPID PANEL: CPT

## 2024-12-03 PROCEDURE — 71045 X-RAY EXAM CHEST 1 VIEW: CPT | Performed by: INTERNAL MEDICINE

## 2024-12-03 PROCEDURE — 80048 BASIC METABOLIC PNL TOTAL CA: CPT

## 2024-12-03 PROCEDURE — 85025 COMPLETE CBC W/AUTO DIFF WBC: CPT

## 2024-12-04 ENCOUNTER — HOSPITAL ENCOUNTER (OUTPATIENT)
Dept: INTERVENTIONAL RADIOLOGY/VASCULAR | Facility: HOSPITAL | Age: 84
Discharge: HOME | End: 2024-12-04
Attending: INTERNAL MEDICINE | Admitting: INTERNAL MEDICINE
Payer: MEDICARE

## 2024-12-04 VITALS
TEMPERATURE: 98 F | HEART RATE: 66 BPM | HEIGHT: 64 IN | BODY MASS INDEX: 23.05 KG/M2 | OXYGEN SATURATION: 94 % | DIASTOLIC BLOOD PRESSURE: 46 MMHG | SYSTOLIC BLOOD PRESSURE: 114 MMHG | RESPIRATION RATE: 17 BRPM | WEIGHT: 135 LBS

## 2024-12-04 DIAGNOSIS — Z01.818 PRE-OP TESTING: Primary | ICD-10-CM

## 2024-12-04 DIAGNOSIS — I35.0 AORTIC STENOSIS: ICD-10-CM

## 2024-12-04 LAB — ISTAT ACTIVATED CLOTTING TIME: 220 SECONDS (ref 74–137)

## 2024-12-04 PROCEDURE — 92978 ENDOLUMINL IVUS OCT C 1ST: CPT | Performed by: INTERNAL MEDICINE

## 2024-12-04 PROCEDURE — B240ZZ3 ULTRASONOGRAPHY OF SINGLE CORONARY ARTERY, INTRAVASCULAR: ICD-10-PCS | Performed by: INTERNAL MEDICINE

## 2024-12-04 PROCEDURE — B2111ZZ FLUOROSCOPY OF MULTIPLE CORONARY ARTERIES USING LOW OSMOLAR CONTRAST: ICD-10-PCS | Performed by: INTERNAL MEDICINE

## 2024-12-04 PROCEDURE — 4A023N7 MEASUREMENT OF CARDIAC SAMPLING AND PRESSURE, LEFT HEART, PERCUTANEOUS APPROACH: ICD-10-PCS | Performed by: INTERNAL MEDICINE

## 2024-12-04 PROCEDURE — 93010 ELECTROCARDIOGRAM REPORT: CPT | Performed by: STUDENT IN AN ORGANIZED HEALTH CARE EDUCATION/TRAINING PROGRAM

## 2024-12-04 PROCEDURE — 85347 COAGULATION TIME ACTIVATED: CPT

## 2024-12-04 PROCEDURE — 93799 UNLISTED CV SVC/PROCEDURE: CPT | Performed by: INTERNAL MEDICINE

## 2024-12-04 PROCEDURE — 99153 MOD SED SAME PHYS/QHP EA: CPT | Performed by: INTERNAL MEDICINE

## 2024-12-04 PROCEDURE — 99152 MOD SED SAME PHYS/QHP 5/>YRS: CPT | Performed by: INTERNAL MEDICINE

## 2024-12-04 PROCEDURE — 027034Z DILATION OF CORONARY ARTERY, ONE ARTERY WITH DRUG-ELUTING INTRALUMINAL DEVICE, PERCUTANEOUS APPROACH: ICD-10-PCS | Performed by: INTERNAL MEDICINE

## 2024-12-04 PROCEDURE — 93458 L HRT ARTERY/VENTRICLE ANGIO: CPT | Performed by: INTERNAL MEDICINE

## 2024-12-04 PROCEDURE — B2151ZZ FLUOROSCOPY OF LEFT HEART USING LOW OSMOLAR CONTRAST: ICD-10-PCS | Performed by: INTERNAL MEDICINE

## 2024-12-04 PROCEDURE — 93005 ELECTROCARDIOGRAM TRACING: CPT

## 2024-12-04 PROCEDURE — 4A033BC MEASUREMENT OF ARTERIAL PRESSURE, CORONARY, PERCUTANEOUS APPROACH: ICD-10-PCS | Performed by: INTERNAL MEDICINE

## 2024-12-04 RX ORDER — CLOPIDOGREL BISULFATE 75 MG/1
75 TABLET ORAL DAILY
Qty: 90 TABLET | Refills: 3 | Status: SHIPPED | OUTPATIENT
Start: 2024-12-04

## 2024-12-04 RX ORDER — CLOPIDOGREL BISULFATE 75 MG/1
TABLET ORAL
Status: COMPLETED
Start: 2024-12-04 | End: 2024-12-04

## 2024-12-04 RX ORDER — HEPARIN SODIUM 5000 [USP'U]/ML
INJECTION, SOLUTION INTRAVENOUS; SUBCUTANEOUS
Status: COMPLETED
Start: 2024-12-04 | End: 2024-12-04

## 2024-12-04 RX ORDER — LIDOCAINE HYDROCHLORIDE 10 MG/ML
INJECTION, SOLUTION EPIDURAL; INFILTRATION; INTRACAUDAL; PERINEURAL
Status: COMPLETED
Start: 2024-12-04 | End: 2024-12-04

## 2024-12-04 RX ORDER — SODIUM CHLORIDE 9 MG/ML
INJECTION, SOLUTION INTRAVENOUS
Status: DISCONTINUED | OUTPATIENT
Start: 2024-12-05 | End: 2024-12-04 | Stop reason: HOSPADM

## 2024-12-04 RX ORDER — MIDAZOLAM HYDROCHLORIDE 1 MG/ML
INJECTION INTRAMUSCULAR; INTRAVENOUS
Status: COMPLETED
Start: 2024-12-04 | End: 2024-12-04

## 2024-12-04 RX ORDER — VERAPAMIL HYDROCHLORIDE 2.5 MG/ML
INJECTION, SOLUTION INTRAVENOUS
Status: COMPLETED
Start: 2024-12-04 | End: 2024-12-04

## 2024-12-04 RX ORDER — NITROGLYCERIN 20 MG/100ML
INJECTION INTRAVENOUS
Status: COMPLETED
Start: 2024-12-04 | End: 2024-12-04

## 2024-12-04 NOTE — PROGRESS NOTES
Pt post PCI. Pt awake, vss. Right radial artery access site is CDI with TR band in place.     Recovery complete per protocol. Vss. TR band removed with no signs of bleeding or hematoma. Right wrist is very ecchymotic, good radial pulse. Discharge instructions reviewed, iv dc'd and pt discharged home with daughter driving.    [id number]: || ||00\01||False;

## 2024-12-04 NOTE — PROCEDURES
OPERATIVE REPORT - CARDIAC CATHETERIZATION    Date of Procedure: 12/4/2024     Performing Physician: Justo Flores MD    Referring Physician: Dao Cox MD    Pre-Procedure Diagnosis:   Exertional chest discomfort and dyspnea  CAD by CCTA  Abnormal nuclear stress test in the LAD territory    Post-Procedure Diagnosis:  Same as pre     Findings:  Left main: No stenosis  LAD: Prox/mid 70% stenosis, large proximal D1 luminal irregularities, small D2 (< 1 mm)  Left circumflex: luminal irregularities    Ramus: medium sized, luminal irregularities    RCA: Dominant to PDA. luminal irregularities    Hemodynamics: LVEDP 10 mmHg. Mild LV-Ao gradient on pullback (peak to peak 14 mmHg).     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 S/p IVUS guided PARAS x 1.   Mild aortic stenosis with normal LVEDP  DAPT 1 year (ASA, plavix)  Remove the TR band per protocol  Post cath IVF  Continue medical therapy    -----------------------    Procedures performed:   1. Left heart catheterization  2. Selective bilateral coronary angiography  3. Moderate sedation  4. Access of right radial artery  5. IVUS LAD  6. PCI LAD with PARAS x 1  7. iFR LAD    Indications: This is a 84 year old female presenting with CID, abnormal stress test, and known pLAD stenosis by CCTA for left heart catheterization with coronary angiography to evaluate for obstructive CAD.     Description of Procedure: Informed consent was obtained from the patient in writing. The risks and benefits of the procedure were reviewed in detail with the patient, including but not limited to the risk of myocardial infarction, stroke, renal failure, bleeding, and death. After a thorough discussion of these risks and benefits, the patient agreed to proceed and signed an informed consent  document. The patient was brought to the cardiac catheterization laboratory in the fasting state. Moderate sedation was employed using a total of IV Versed 3 mg and IV fentanyl 75 mcg in divided doses.  I directly observed the patient from 1437 to 1545, and an independent trained observer was present and assisted in the monitoring of the patient's level of consciousness and physiological status, heart rate, blood pressure, oximetry, and rhythm. All operators present for the case performed standard pre-procedural prep including hand washing, sterile gloves, gown, mask, and cap. All aspects of the maximum sterile barrier technique were followed. A preprocedural time out was performed with all physicians, technologists, and nurses involved with the procedure. 1% lidocaine was infiltrated subcutaneously in the right wrist for local anesthesia. Ultrasound guided access was obtained in the right radial artery with a 5/6F Slender Glidesheath using the Seldinger technique and a micropuncture needle with a single anterior wall puncture. Standard vasodilator cocktail was given with heparin 5000 units, nitroglycerin 200 mcg and verapamil 2.5 mg through the arterial sheath. A 6F TIG catheter was advanced over a J tipped wire through the arterial sheath and placed in the aortic root, then used to selectively engage the left coronary artery. Standard angiographic views were taken in orthogonal projections. The RCA was then engaged and standard angiographic views were performed. The catheter was then prolapsed into the LV over a wire and placed at the base of the LV. LV systolic and end diastolic pressure was then recorded. The catheter was pulled back and pullback measurements of LV and aortic pressure and recorded. The catheter was then removed over a wire and exchanged for a 6F EBU 3.5 guide and engaged the LCA. Additional weight based heparin given to target therapeutic ACT and was monitored routinely throughout the case with  additional heparin given as needed for ACT target > 250 sec. The flow wire (Omniwire) was prepared in the usual fashion. The wire was advanced with the sensor at the tip of the guide. The catheter was flushed with saline. Pd/Pa was equalized. The wire was then advanced to the distal LAD. Several measurements of iFR were taken confirming ischemia producing lesion (diamond 0.85). The wire was then pulled back to the catheter and no pressure drift was confirmed. We predilated with a 2.0 x 12 mm balloon to nomimal. IVUS performed for sizing. I placed a 3.0 x 18 mm Xience Skypoint PARAS to 12 isabelle. I then post dilated with 3.5 NC to 16 isabelle within the stent edges. I performed IVUS again showing full stent expansion and apposition without dissection. Final angio showed ANDREWS 3 flow without dissection or perforation. At the conclusion of the procedure, all catheters and wires were removed over a wire. The arterial sheath was removed and hemostasis achieved with standard TR band using patent hemostasis technique. There was no bleeding or hematoma. The patient tolerated the procedure well without complication. EBL <10 mL. Total contrast ~ 160 mL. See procedure log for fluoro time and radiation dose.      Justo Flores MD  Interventional Cardiology  [facility]

## 2024-12-04 NOTE — DISCHARGE INSTRUCTIONS
HOME CARE INSTRUCTIONS FOLLOWING CORONARY ANGIOGRAPHY,  PERIPHERAL ANGIOGRAPHY, ANGIOPLASTY (PTCA/PTA) OR INSERTION  OF STENT IN THE CORONARY, CAROTID, AND/OR PERIPHERAL ARTERIES      Activity:   DO NOT drive after the procedure. You may resume driving late the following day according to the nurse or physician’s instructions   Plan on resting and relaxing tonight and tomorrow   Resume your normal activity after 48 hours, or as instructed by your physician   Do not lift anything over 10 pounds for the next 24 hours   Avoid sexual activity for the next 24 hours   If the wrist was used, avoid bending/flexing of the wrist for the next 24 hours.       What is Normal?   A small lump at the procedure site associated with mild tenderness when touched   The procedure site may be bruised or discolored   There may be a small amount of drainage on the bandage    Special Instructions:   Drink plenty of fluids during the next 24 hours to “flush” the contrast from your system   The bandage is to remain in place for 24 hours   Keep the bandage clean and dry   DO NOT submerge the procedure site for 72 hours (no bath tubs or pools). This includes dishwashing/submersion of the wrist, if the wrist was used   After 24 hours, you must remove the bandage   You should shower after removing the bandage, and wash the procedure site gently with soap and water   If you choose to wear a bandage for a few days, make sure it remains clean and dry and that it is changed daily    When you should NOTIFY YOUR PHYSICIAN:   Bleeding can occur at the procedure site - both on the outside of the skin and/or beneath the surface of the skin   Swelling or a large lump at the procedure site can occur, which may be accompanied by moderate to severe pain. If either of the above occurs, lie down flat. Have someone apply pressure to the procedure site with both hands, as instructed by the nurse. Hold pressure for 20 minutes and the bleeding should stop. Notify  your physician of the occurrence. If the bleeding does not stop, call 911 and continue to apply pressure   If you experience signs of a fever, temperature >101 degrees, chills, infection (redness, swelling, thick yellow drainage, or a foul odor from the procedure site)   If you notice any numbness, tingling, or loss of feeling to your fingers or hand, if wrist access was utilized    If you Received a Stent:  - You will remain on an antiplatelet drug and/or aspirin. Antiplatelet medications are usually taken for six months to one year and should not be stopped unless your cardiologist directs you to do so. These medications help to prevent blockage at the stent site. If another physician or dentist asks you to stop your antiplatelet medication, you need to consult your cardiologist first. Together, your cardiologist and other physician can discuss the risks that may be involved if you are not taking the antiplatelet medication.   - If a MRI is necessary, it may be done 4-6 weeks after your procedure. Verify this with your cardiologist.  - Keep the stent card with you at all times! If you need a MRI in the future, your stent card will need to be shown to the technologist before performing the MRI. A duplicate card CANNOT be reproduced.     Other:  - You may resume your present diet, unless otherwise specified by your physician.   - You may resume all of your medications as prescribed, unless otherwise directed by your physician. A list of your medications was provided to you at discharge.  - Please call your physician's office for a follow-up appointment. You should be seen in 2 weeks.  - Do not make any personal/business decisions and/or sign any legal documents for the next 24 hours.

## 2024-12-05 LAB
ATRIAL RATE: 58 BPM
P AXIS: 53 DEGREES
P-R INTERVAL: 188 MS
Q-T INTERVAL: 446 MS
QRS DURATION: 96 MS
QTC CALCULATION (BEZET): 437 MS
R AXIS: 41 DEGREES
T AXIS: 38 DEGREES
VENTRICULAR RATE: 58 BPM

## 2024-12-09 ENCOUNTER — OFFICE VISIT (OUTPATIENT)
Dept: HEMATOLOGY/ONCOLOGY | Facility: HOSPITAL | Age: 84
End: 2024-12-09
Attending: INTERNAL MEDICINE
Payer: MEDICARE

## 2024-12-09 ENCOUNTER — MED REC SCAN ONLY (OUTPATIENT)
Dept: INTERNAL MEDICINE CLINIC | Facility: CLINIC | Age: 84
End: 2024-12-09

## 2024-12-09 DIAGNOSIS — D63.8 ANEMIA OF CHRONIC DISEASE: Primary | ICD-10-CM

## 2024-12-09 LAB — HGB BLD-MCNC: 8.9 G/DL

## 2024-12-09 PROCEDURE — 36415 COLL VENOUS BLD VENIPUNCTURE: CPT

## 2024-12-09 PROCEDURE — 96372 THER/PROPH/DIAG INJ SC/IM: CPT

## 2024-12-09 PROCEDURE — 85018 HEMOGLOBIN: CPT

## 2024-12-09 NOTE — PROGRESS NOTES
Pt arrived amb for PL and poss Retacrit.  Pt met criteria for Retacrit administration.  Given per MD order.  Pt rolan well.  Left amb with future appointments scheduled.    Education Record    Learner:  Patient    Pt here for: Retacrit for anemia    Barriers / Limitations:  None    Method:  discussion    General Topics:  Plan of care reviewed    Outcome: Pt tolerated injection with no c/o.

## 2024-12-10 ENCOUNTER — PATIENT OUTREACH (OUTPATIENT)
Dept: CASE MANAGEMENT | Age: 84
End: 2024-12-10

## 2024-12-10 NOTE — PROGRESS NOTES
Hospital Follow up for Cardio (Discharge 12/4 edw    Cardio   Dao Cox ;   [address]   [address]   [facility] [address]   [phone]   Existing appt for 12/18 @3:20pm     PCP   Ayad Chowdhury   [address]  [address]  [place] [address]  [phone]    Attempt #1:  Left message on voicemail for patient to call transitions specialist back to schedule follow up appointments. Provided Transitions specialist scheduling phone number [phone].

## 2024-12-11 NOTE — PROGRESS NOTES
Hospital Follow up for Cardio (Discharge 12/4 edw)    Cardio   Dao Cox ;   [address]   [address]   [facility] [id number] [phone]   Existing appt for 12/18 @3:20pm     PCP   Ayad Chowdhury   [address]  [address]  [place] [address]  [phone]  Pt declined to make appt at this time; pt stated wanted to think about it; gave number for department if they change their mind to schedule    Confirmed with pt   Closing encounter

## 2024-12-15 NOTE — DISCHARGE SUMMARY
This is a 84 year old female presenting with CID, abnormal stress test, and known pLAD stenosis by CCTA for left heart catheterization with coronary angiography to evaluate for obstructive CAD. Cath result:    Findings:  Left main: No stenosis  LAD: Prox/mid 70% stenosis, large proximal D1 luminal irregularities, small D2 (< 1 mm)  Left circumflex: luminal irregularities    Ramus: medium sized, luminal irregularities    RCA: Dominant to PDA. luminal irregularities    Hemodynamics: LVEDP 10 mmHg. Mild LV-Ao gradient on pullback (peak to peak 14 mmHg).      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 S/p IVUS guided PARAS x 1.   Mild aortic stenosis with normal LVEDP  DAPT 1 year (ASA, plavix)  Remove the TR band per protocol  Post cath IVF  Continue medical therapy      Discharged with follow up plan provided.

## 2024-12-23 ENCOUNTER — APPOINTMENT (OUTPATIENT)
Age: 84
End: 2024-12-23
Attending: INTERNAL MEDICINE
Payer: MEDICARE

## 2024-12-30 ENCOUNTER — OFFICE VISIT (OUTPATIENT)
Age: 84
End: 2024-12-30
Attending: INTERNAL MEDICINE
Payer: MEDICARE

## 2024-12-30 VITALS
TEMPERATURE: 97 F | SYSTOLIC BLOOD PRESSURE: 133 MMHG | OXYGEN SATURATION: 96 % | DIASTOLIC BLOOD PRESSURE: 52 MMHG | RESPIRATION RATE: 18 BRPM | HEART RATE: 66 BPM

## 2024-12-30 DIAGNOSIS — D63.8 ANEMIA OF CHRONIC DISEASE: Primary | ICD-10-CM

## 2024-12-30 LAB — HGB BLD-MCNC: 9 G/DL

## 2024-12-30 NOTE — PROGRESS NOTES
Education Record    Learner:  Patient    Disease / Diagnosis: anemia    Barriers / Limitations:  None   Comments:    Method:  Discussion   Comments:    General Topics:  Medication, Plan of care reviewed, and Fall risk and prevention   Comments:    Outcome:  Shows understanding   Comments:    Hgb 9.0 today, retacrit inj administered per order. Pt tolerated well. Discharged ambulatory in stable condition.

## 2025-01-09 ENCOUNTER — TELEPHONE (OUTPATIENT)
Dept: HEMATOLOGY/ONCOLOGY | Facility: HOSPITAL | Age: 85
End: 2025-01-09

## 2025-01-09 NOTE — TELEPHONE ENCOUNTER
Patient is calling because she is stating that her Infusion is on the wrong week. She would a call back.

## 2025-01-13 ENCOUNTER — APPOINTMENT (OUTPATIENT)
Age: 85
End: 2025-01-13
Attending: INTERNAL MEDICINE
Payer: MEDICARE

## 2025-01-20 ENCOUNTER — OFFICE VISIT (OUTPATIENT)
Age: 85
End: 2025-01-20
Attending: INTERNAL MEDICINE
Payer: MEDICARE

## 2025-01-20 VITALS
HEART RATE: 64 BPM | DIASTOLIC BLOOD PRESSURE: 59 MMHG | RESPIRATION RATE: 18 BRPM | OXYGEN SATURATION: 96 % | SYSTOLIC BLOOD PRESSURE: 126 MMHG | TEMPERATURE: 97 F

## 2025-01-20 DIAGNOSIS — D63.8 ANEMIA OF CHRONIC DISEASE: Primary | ICD-10-CM

## 2025-01-20 LAB — HGB BLD-MCNC: 9.3 G/DL

## 2025-01-20 NOTE — PROGRESS NOTES
Education Record    Learner:  Patient    Disease / Diagnosis: anemia of chronic disease    Barriers / Limitations:  None   Comments:    Method:  Brief focused   Comments:    General Topics:  Plan of care reviewed   Comments:    Outcome:  Shows understanding   Comments:    Hgb 9.3-retacrit 40,000 given without complication. Pt to rtc 2/10 for next appt.

## 2025-02-03 ENCOUNTER — OFFICE VISIT (OUTPATIENT)
Dept: INTERNAL MEDICINE CLINIC | Facility: CLINIC | Age: 85
End: 2025-02-03
Payer: MEDICARE

## 2025-02-03 ENCOUNTER — APPOINTMENT (OUTPATIENT)
Age: 85
End: 2025-02-03
Attending: INTERNAL MEDICINE
Payer: MEDICARE

## 2025-02-03 VITALS
BODY MASS INDEX: 24.07 KG/M2 | HEART RATE: 66 BPM | WEIGHT: 141 LBS | TEMPERATURE: 98 F | HEIGHT: 64 IN | RESPIRATION RATE: 16 BRPM | OXYGEN SATURATION: 96 % | DIASTOLIC BLOOD PRESSURE: 70 MMHG | SYSTOLIC BLOOD PRESSURE: 110 MMHG

## 2025-02-03 DIAGNOSIS — R16.0 LIVER MASS: ICD-10-CM

## 2025-02-03 DIAGNOSIS — M35.00 SICCA SYNDROME (HCC): ICD-10-CM

## 2025-02-03 DIAGNOSIS — D64.9 ANEMIA, UNSPECIFIED TYPE: ICD-10-CM

## 2025-02-03 DIAGNOSIS — M35.00 SJOGREN'S SYNDROME WITHOUT EXTRAGLANDULAR INVOLVEMENT (HCC): ICD-10-CM

## 2025-02-03 DIAGNOSIS — Z12.31 ENCOUNTER FOR SCREENING MAMMOGRAM FOR MALIGNANT NEOPLASM OF BREAST: ICD-10-CM

## 2025-02-03 DIAGNOSIS — D89.89 AUTOIMMUNE DISORDER (HCC): ICD-10-CM

## 2025-02-03 DIAGNOSIS — Z78.0 POST-MENOPAUSAL: ICD-10-CM

## 2025-02-03 DIAGNOSIS — R26.81 GAIT INSTABILITY: ICD-10-CM

## 2025-02-03 DIAGNOSIS — F41.9 ANXIETY: ICD-10-CM

## 2025-02-03 DIAGNOSIS — E78.2 MIXED HYPERLIPIDEMIA: ICD-10-CM

## 2025-02-03 DIAGNOSIS — I10 PRIMARY HYPERTENSION: ICD-10-CM

## 2025-02-03 DIAGNOSIS — Z00.00 ROUTINE GENERAL MEDICAL EXAMINATION AT A HEALTH CARE FACILITY: Primary | ICD-10-CM

## 2025-02-03 PROCEDURE — 96160 PT-FOCUSED HLTH RISK ASSMT: CPT | Performed by: INTERNAL MEDICINE

## 2025-02-03 PROCEDURE — 1125F AMNT PAIN NOTED PAIN PRSNT: CPT | Performed by: INTERNAL MEDICINE

## 2025-02-03 PROCEDURE — 1170F FXNL STATUS ASSESSED: CPT | Performed by: INTERNAL MEDICINE

## 2025-02-03 PROCEDURE — G0439 PPPS, SUBSEQ VISIT: HCPCS | Performed by: INTERNAL MEDICINE

## 2025-02-03 PROCEDURE — 1159F MED LIST DOCD IN RCRD: CPT | Performed by: INTERNAL MEDICINE

## 2025-02-03 PROCEDURE — 99214 OFFICE O/P EST MOD 30 MIN: CPT | Performed by: INTERNAL MEDICINE

## 2025-02-06 NOTE — PROGRESS NOTES
Subjective:   Patient ID: Karolina Contreras is a 84 year old female.    /70   Pulse 66   Temp 98 °F (36.7 °C) (Temporal)   Resp 16   Ht 5' 4\" (1.626 m)   Wt 141 lb (64 kg)   LMP  (LMP Unknown)   SpO2 96%   BMI 24.20 kg/m²     HPI  Here for aha, htn, anxiety controlled, htn, hyperchol, liver mass on us, mri us, not done, pt refuses to do anything besides repeat us, she understands may prevent or delay diagnosis, co of longstanding gait instability  History/Other:   Review of Systems   Constitutional:  Negative for fever.   HENT:  Negative for congestion.    Eyes:  Negative for visual disturbance.   Respiratory:  Negative for shortness of breath.    Cardiovascular:  Negative for chest pain.   Gastrointestinal:  Negative for abdominal pain.   Endocrine: Negative for polyuria.   Genitourinary:  Negative for dysuria.   Musculoskeletal:  Negative for arthralgias.   Skin:  Negative for rash.   Neurological:  Negative for headaches.   Psychiatric/Behavioral:  Negative for confusion.      Current Outpatient Medications   Medication Sig Dispense Refill    clopidogrel 75 MG Oral Tab Take 1 tablet (75 mg total) by mouth daily. 90 tablet 3    escitalopram (LEXAPRO) 10 MG Oral Tab Take 1 tablet (10 mg total) by mouth daily. 90 tablet 1    cycloSPORINE 0.05 % Ophthalmic Emulsion Place 1 drop into both eyes every 12 (twelve) hours.      metoprolol succinate ER 25 MG Oral Tablet 24 Hr Take 1 tablet (25 mg total) by mouth daily. 90 tablet 3    Irbesartan 300 MG Oral Tab Take 1 tablet (300 mg total) by mouth nightly. 90 tablet 3    rosuvastatin 10 MG Oral Tab Take 1 tablet (10 mg total) by mouth daily. 90 tablet 3    aspirin 81 MG Oral Tab EC Take 1 tablet (81 mg total) by mouth daily.  0    magnesium 30 MG Oral Tab Take 1 tablet (30 mg total) by mouth twice a week.      Ascorbic Acid (VITAMIN C) 1000 MG Oral Tab Take 1 tablet (1,000 mg total) by mouth daily.      vitamin B-12 50 MCG Oral Tab Take 1 tablet (50 mcg  total) by mouth daily.      Multiple Vitamins-Minerals (OCUVITE EXTRA) Oral Tab Take 2 tablets by mouth daily.      acetaminophen 325 MG Oral Tab Take 1 tablet (325 mg total) by mouth every 6 (six) hours as needed for Pain.      Cholecalciferol (VITAMIN D) 1000 UNIT Oral Cap Take 5,000 Units by mouth. Patient's daughter reports patient is taking Vitamin D 2000 units every other day.       CENTRUM SILVER OR TABS every other day       Allergies:Allergies[1]    Objective:   Physical Exam  Constitutional:       Appearance: Normal appearance.   HENT:      Head: Normocephalic and atraumatic.      Right Ear: Tympanic membrane normal.      Left Ear: Tympanic membrane normal.   Eyes:      Pupils: Pupils are equal, round, and reactive to light.   Cardiovascular:      Rate and Rhythm: Regular rhythm.      Heart sounds: No murmur heard.  Pulmonary:      Breath sounds: Normal breath sounds.   Abdominal:      Palpations: Abdomen is soft.   Musculoskeletal:         General: No swelling.      Cervical back: Neck supple.   Skin:     General: Skin is warm.   Neurological:      General: No focal deficit present.      Mental Status: She is alert.      Comments: Slight gait instability with walking   Psychiatric:         Mood and Affect: Mood normal.         Assessment & Plan:   1. Routine general medical examination at a health care facility -see aha form filled out for details   2. Sjogren's syndrome without extraglandular involvement (HCC) -fu with rheum   3. Sicca syndrome (HCC) -fu with rheum   4. Autoimmune disorder (HCC) -follows with rheum   5. Mixed hyperlipidemia -cont meds   6. Primary hypertension -cont meds, at goal   7. Anxiety -stable on ssri   8. Anemia, unspecified type -follow   9. Liver mass -recheck us, refuses other workup or eval   10. Gait instability -long standing, referred to neuro   11. Post-menopausal -check dexa   12. Encounter for screening mammogram for malignant neoplasm of breast -[facility] ordered   Fu in 6  months    No orders of the defined types were placed in this encounter.      Meds This Visit:  Requested Prescriptions      No prescriptions requested or ordered in this encounter       Imaging & Referrals:  NEURO - INTERNAL  CEZAR MARCOS 2D+3D SCREENING BILAT (CPT=77067/40091)  XR DEXA BONE DENSITOMETRY (CPT=77080)  US ABDOMEN COMPLETE (CPT=76700)         [1] No Known Allergies

## 2025-02-10 ENCOUNTER — APPOINTMENT (OUTPATIENT)
Age: 85
End: 2025-02-10
Attending: INTERNAL MEDICINE
Payer: MEDICARE

## 2025-02-11 ENCOUNTER — OFFICE VISIT (OUTPATIENT)
Age: 85
End: 2025-02-11
Attending: INTERNAL MEDICINE
Payer: MEDICARE

## 2025-02-11 VITALS
RESPIRATION RATE: 18 BRPM | TEMPERATURE: 97 F | OXYGEN SATURATION: 94 % | HEART RATE: 71 BPM | DIASTOLIC BLOOD PRESSURE: 51 MMHG | SYSTOLIC BLOOD PRESSURE: 120 MMHG

## 2025-02-11 DIAGNOSIS — D63.8 ANEMIA OF CHRONIC DISEASE: Primary | ICD-10-CM

## 2025-02-11 LAB — HGB BLD-MCNC: 9 G/DL

## 2025-02-11 NOTE — PROGRESS NOTES
Education Record    Learner:  Patient    Disease / Diagnosis: anemia of chronic disease    Barriers / Limitations:  None   Comments:    Method:  Brief focused   Comments:    General Topics:  Plan of care reviewed   Comments:    Outcome:  Shows understanding   Comments:    Retacrit inj 40,000 units. Hgb 9.0. tolerated well without issue. Will return for same in 3 weeks.

## 2025-02-17 ENCOUNTER — APPOINTMENT (OUTPATIENT)
Age: 85
End: 2025-02-17
Attending: INTERNAL MEDICINE
Payer: MEDICARE

## 2025-02-17 ENCOUNTER — HOSPITAL ENCOUNTER (OUTPATIENT)
Dept: MAMMOGRAPHY | Age: 85
Discharge: HOME | End: 2025-02-17
Attending: INTERNAL MEDICINE
Payer: MEDICARE

## 2025-02-17 ENCOUNTER — HOSPITAL ENCOUNTER (OUTPATIENT)
Dept: BONE DENSITY | Age: 85
Discharge: HOME | End: 2025-02-17
Attending: INTERNAL MEDICINE
Payer: MEDICARE

## 2025-02-17 DIAGNOSIS — Z78.0 POST-MENOPAUSAL: ICD-10-CM

## 2025-02-17 DIAGNOSIS — Z12.31 ENCOUNTER FOR SCREENING MAMMOGRAM FOR MALIGNANT NEOPLASM OF BREAST: ICD-10-CM

## 2025-02-17 PROCEDURE — 77063 BREAST TOMOSYNTHESIS BI: CPT | Performed by: INTERNAL MEDICINE

## 2025-02-17 PROCEDURE — 77080 DXA BONE DENSITY AXIAL: CPT | Performed by: INTERNAL MEDICINE

## 2025-02-17 PROCEDURE — 77067 SCR MAMMO BI INCL CAD: CPT | Performed by: INTERNAL MEDICINE

## 2025-02-25 ENCOUNTER — ORDER TRANSCRIPTION (OUTPATIENT)
Dept: CARDIAC REHAB | Facility: HOSPITAL | Age: 85
End: 2025-02-25

## 2025-02-25 DIAGNOSIS — Z95.5 STENTED CORONARY ARTERY: Primary | ICD-10-CM

## 2025-02-27 ENCOUNTER — CARDPULM VISIT (OUTPATIENT)
Age: 85
End: 2025-02-27
Attending: INTERNAL MEDICINE
Payer: MEDICARE

## 2025-03-03 ENCOUNTER — OFFICE VISIT (OUTPATIENT)
Age: 85
End: 2025-03-03
Attending: INTERNAL MEDICINE
Payer: MEDICARE

## 2025-03-03 VITALS
HEIGHT: 64.02 IN | RESPIRATION RATE: 16 BRPM | BODY MASS INDEX: 23.93 KG/M2 | SYSTOLIC BLOOD PRESSURE: 150 MMHG | TEMPERATURE: 97 F | WEIGHT: 140.19 LBS | OXYGEN SATURATION: 95 % | DIASTOLIC BLOOD PRESSURE: 64 MMHG | HEART RATE: 75 BPM

## 2025-03-03 DIAGNOSIS — D63.8 ANEMIA OF CHRONIC DISEASE: Primary | ICD-10-CM

## 2025-03-03 LAB
BASOPHILS # BLD AUTO: 0.05 X10(3) UL (ref 0–0.2)
BASOPHILS NFR BLD AUTO: 0.6 %
EOSINOPHIL # BLD AUTO: 0.4 X10(3) UL (ref 0–0.7)
EOSINOPHIL NFR BLD AUTO: 4.6 %
ERYTHROCYTE [DISTWIDTH] IN BLOOD BY AUTOMATED COUNT: 25.8 %
HCT VFR BLD AUTO: 27.2 %
HGB BLD-MCNC: 8.8 G/DL
IMM GRANULOCYTES # BLD AUTO: 0.04 X10(3) UL (ref 0–1)
IMM GRANULOCYTES NFR BLD: 0.5 %
LYMPHOCYTES # BLD AUTO: 2.17 X10(3) UL (ref 1–4)
LYMPHOCYTES NFR BLD AUTO: 24.9 %
MCH RBC QN AUTO: 32.4 PG (ref 26–34)
MCHC RBC AUTO-ENTMCNC: 32.4 G/DL (ref 31–37)
MCV RBC AUTO: 100 FL
MONOCYTES # BLD AUTO: 0.86 X10(3) UL (ref 0.1–1)
MONOCYTES NFR BLD AUTO: 9.9 %
NEUTROPHILS # BLD AUTO: 5.2 X10 (3) UL (ref 1.5–7.7)
NEUTROPHILS # BLD AUTO: 5.2 X10(3) UL (ref 1.5–7.7)
NEUTROPHILS NFR BLD AUTO: 59.5 %
PLATELET # BLD AUTO: 334 10(3)UL (ref 150–450)
PLATELET MORPHOLOGY: NORMAL
RBC # BLD AUTO: 2.72 X10(6)UL
WBC # BLD AUTO: 8.7 X10(3) UL (ref 4–11)

## 2025-03-03 RX ORDER — ESCITALOPRAM OXALATE 10 MG/1
10 TABLET ORAL DAILY
Qty: 90 TABLET | Refills: 3 | Status: SHIPPED | OUTPATIENT
Start: 2025-03-03

## 2025-03-03 RX ORDER — IRBESARTAN 300 MG/1
300 TABLET ORAL NIGHTLY
Qty: 90 TABLET | Refills: 3 | Status: SHIPPED | OUTPATIENT
Start: 2025-03-03

## 2025-03-03 NOTE — PROGRESS NOTES
Called lab and was given preliminary Hgb of 8.8. Informed Dr. Wilcox's RN, Gladys. Ok to give Retacrit with this result per Dr. Wilcox.

## 2025-03-03 NOTE — PROGRESS NOTES
Education Record    Learner:  Patient    Disease / Diagnosis: Anemia of chronic disease    Barriers / Limitations:  None   Comments:    Method:  Brief focused and Reinforcement   Comments:    General Topics:  Medication, Side effects and symptom management, Plan of care reviewed, and Fall risk and prevention   Comments:    Outcome:  Shows understanding   Comments:  Appointments requested. Discussed follow up with Dr. Wilcox's RN, Gladys. Patient will be seeing Dr. Wilcox in June (15 weeks, instead of May 27th as he does not have clinic on Tuesdays due to Neuro clinic.) Patient will check her appointments on [facility]. Patient discharged in good condition.

## 2025-03-03 NOTE — TELEPHONE ENCOUNTER
Refill Per Protocol     Requested Prescriptions   Pending Prescriptions Disp Refills    IRBESARTAN 300 MG Oral Tab [Pharmacy Med Name: IRBESARTAN TABS 300MG] 90 tablet 3     Sig: TAKE 1 TABLET NIGHTLY       Hypertension Medications Protocol Passed - 3/3/2025 12:15 PM        Passed - CMP or BMP in past 12 months        Passed - Last BP reading less than 140/90     BP Readings from Last 1 Encounters:   02/11/25 120/51               Passed - In person appointment or virtual visit in the past 12 mos or appointment in next 3 mos     Recent Outpatient Visits              2 weeks ago Anemia of chronic disease    [facility]    Office Visit    4 weeks ago Routine general medical examination at a health care facility    [facility], [place] Ayad Chowdhury MD    Office Visit    1 month ago Anemia of chronic disease    [facility]    Office Visit    2 months ago Anemia of chronic disease    [facility]    Office Visit    2 months ago Anemia of chronic disease    [facility]    Office Visit          Future Appointments         Provider Department Appt Notes    Today Marco A Wilcox MD [facility] Hematology Oncology Pete verde md, inj retacrit    Today NP [place] RN7 [facility] Pete verde md, inj retacrit    Tomorrow  CARDIAC REHAB ROOM 1 Cardiopulmonary Rehabilitation, [place] [address] [address]  Dr. El purcell    In 3 days  CARDIAC REHAB ROOM 1 Cardiopulmonary Rehabilitation, [place] [address]  Dr. El purcell    In 4 days [address]     In 1 week  CARDIAC REHAB ROOM 1 Cardiopulmonary Rehabilitation, [place] [address] [address]  Dr. El purcell    In 1 week  CARDIAC REHAB ROOM 1 Cardiopulmonary  Rehabilitation, [address] [place] [address]  Dr. Gallegos stent    In 2 weeks [facility] CARDIAC REHAB ROOM 1 Cardiopulmonary Rehabilitation, [address] [place] [address]  Dr. Gallegos stent    In 2 weeks [facility] CARDIAC REHAB ROOM 1 Cardiopulmonary Rehabilitation, [address] [place] [address]  Dr. Gallegos stent    In 3 weeks [facility] CARDIAC REHAB ROOM 1 Cardiopulmonary Rehabilitation, [address] [place] [address]  Dr. Gallegos stent    In 3 weeks [facility] CARDIAC REHAB ROOM 1 Cardiopulmonary Rehabilitation, [address] [place] [address]  Dr. Gallegos stent    In 4 weeks  CARDIAC REHAB ROOM 1 Cardiopulmonary Rehabilitation, [address] [place] [address]  Dr. Gallegos stent    In 1 month  CARDIAC REHAB ROOM 1 Cardiopulmonary Rehabilitation, [address] [place] [address]  Dr. Gallegos stent    In 1 month Ilan Murphy, [facility], [facility] DX:  Gait instability (R26.81) referred by Dr. Ayad Kelley    In 1 month  CARDIAC REHAB ROOM 1 Cardiopulmonary Rehabilitation, [address] [place] [address]  Dr. Gallegos stent    In 1 month  CARDIAC REHAB ROOM 1 Cardiopulmonary Rehabilitation, [address] [place] [address]  Dr. Gallegos stent    In 1 month  CARDIAC REHAB ROOM 1 Cardiopulmonary Rehabilitation, [address] [place] [address]  Dr. Gallegos stent    In 1 month  CARDIAC REHAB ROOM 1 Cardiopulmonary Rehabilitation, [address] [place] [address]  Dr. Gallegos stent    In 1 month  CARDIAC REHAB ROOM 1 Cardiopulmonary Rehabilitation, [address] [place] [address]  Dr. Gallegos stent    In 1 month  CARDIAC REHAB ROOM 1 Cardiopulmonary Rehabilitation, [address] [place] [address]  Dr. Gallegos stent    In 1 month  CARDIAC REHAB ROOM 1 Cardiopulmonary Rehabilitation, [address] [place] [address]  Dr. Gallegos stent    In 1 month  CARDIAC REHAB ROOM 1 Cardiopulmonary Rehabilitation, [address] [place] [address]  Dr. Gallegos stent    In 2 months  CARDIAC REHAB ROOM 1 Cardiopulmonary Rehabilitation, [address] [place] [address]  Dr. Gallegos stent    In 2 months  CARDIAC REHAB ROOM 1  Cardiopulmonary Rehabilitation, [address] [place] [address]  Dr. Gallegos stent    In 2 months  CARDIAC REHAB ROOM 1 Cardiopulmonary Rehabilitation, [address] [place] [address]  Dr. Gallegos stent    In 2 months  CARDIAC REHAB ROOM 1 Cardiopulmonary Rehabilitation, [address] [place] [address]  Dr. Gallegos stent    In 2 months  CARDIAC REHAB ROOM 1 Cardiopulmonary Rehabilitation, [address] [place] [address]  Dr. Gallegos stent    In 2 months  CARDIAC REHAB ROOM 1 Cardiopulmonary Rehabilitation, [address] [place] [address]  Dr. Gallegos stent    In 2 months  CARDIAC REHAB ROOM 1 Cardiopulmonary Rehabilitation, [address] [place] [address]  Dr. Gallegos stent    In 2 months  CARDIAC REHAB ROOM 1 Cardiopulmonary Rehabilitation, [address] [place] [address]  Dr. Gallegos stent    In 3 months  CARDIAC REHAB ROOM 1 Cardiopulmonary Rehabilitation, [address] [place] [address]  Dr. Gallegos stent    In 3 months  CARDIAC REHAB ROOM 1 Cardiopulmonary Rehabilitation, [address] [place] [address]  Dr. Gallegos stent    In 3 months  CARDIAC REHAB ROOM 1 Cardiopulmonary Rehabilitation, [address] [place] [address]  Dr. Gallegos stent    In 3 months Ayad Chowdhury MD [facility], [address] [place] [address] 4 mth follow up    In 3 months  CARDIAC REHAB ROOM 1 Cardiopulmonary Rehabilitation, [address] [place] [address]  Dr. Gallegos stent    In 3 months  CARDIAC REHAB ROOM 1 Cardiopulmonary Rehabilitation, [address] [place] [address]  Dr. Gallegos stent    In 3 months  CARDIAC REHAB ROOM 1 Cardiopulmonary Rehabilitation, [address] [place] [address]  Dr. Gallegos stent    In 3 months  CARDIAC REHAB ROOM 1 Cardiopulmonary Rehabilitation, [address] [place] [address]  Dr. Gallegos stent    In 3 months  CARDIAC REHAB ROOM 1 Cardiopulmonary Rehabilitation, [address] [place] [address]  Dr. Gallegos stent    In 4 months  CARDIAC REHAB ROOM 1 Cardiopulmonary Rehabilitation, [address] [place] [address]  Dr. Gallegos stent    In 4 months  CARDIAC REHAB ROOM 1 Cardiopulmonary Rehabilitation,  [place] [address]  Dr. Gallegos stent    In 4 months  CARDIAC REHAB ROOM 1 Cardiopulmonary Rehabilitation, [place] [address]  Dr. Gallegos stent    In 4 months  CARDIAC REHAB ROOM 1 Cardiopulmonary Rehabilitation, [place] [address]  Dr. Gallegos stent    In 4 months  CARDIAC REHAB ROOM 1 Cardiopulmonary Rehabilitation, [place] [address]  Dr. Gallegos stent    In 4 months  CARDIAC REHAB ROOM 1 Cardiopulmonary Rehabilitation, [place] [address]  Dr. Gallegos stent                    Passed - EGFRCR or GFRNAA > 50     GFR Evaluation  EGFRCR: 84 , resulted on 12/3/2024          Passed - Medication is active on med list

## 2025-03-03 NOTE — TELEPHONE ENCOUNTER
Refill Per Protocol     Requested Prescriptions   Pending Prescriptions Disp Refills    ESCITALOPRAM 10 MG Oral Tab [Pharmacy Med Name: Escitalopram Oxalate 10 Mg Tab Auro] 90 tablet 0     Sig: Take 1 tablet (10 mg total) by mouth daily.       Psychiatric Non-Scheduled (Anti-Anxiety) Passed - 3/3/2025  4:25 PM        Passed - In person appointment or virtual visit in the past 6 mos or appointment in next 3 mos                                Escitalopram Oxalate 10 mg Oral Daily

## 2025-03-04 ENCOUNTER — CARDPULM VISIT (OUTPATIENT)
Age: 85
End: 2025-03-04
Attending: INTERNAL MEDICINE
Payer: MEDICARE

## 2025-03-04 PROCEDURE — 93798 PHYS/QHP OP CAR RHAB W/ECG: CPT

## 2025-03-04 NOTE — PROGRESS NOTES
Cancer Center Progress Note  Patient Name: Karolina Contreras   YOB: 1940   Medical Record Number: [id number]   [facility]: [id number]   Attending Physician: Marco A Wilcox M.D.       Date of Visit: 3/3/2025       Chief Complaint:  Chief Complaint   Patient presents with    Follow - Up        Oncologic History:  Karolina Contreras is a 84 year old female referred by Dr. Chowdhury for evaluation of her chronic anemia.  The patient reports that she has been anemic for quite some time. She has a long history of weakness and pain in the BLE.  She reports that she has trouble, at times, rising from a seated position.  She also reports that the joints of her fingers swell intermittently. She was seen by Rheumatology, Dr. Braun in the past and was diagnosed with Polymyalgia rheumatica.  She was treated with steroids and then MTX.  She reports that these medications made her feel \"bad all over\". She barnard in [place] and was seen by a rheumatologist there and told that she did not have PMR.  The medications were discontinued. She has had frequent elevations of her ESR, ranging from  in our system, but reports a normal ESR in [place]. Consultation at the [facility] was suggested, but the referral was declined.  She was seen by [facility] hematology in 2015.  The etiology of her anemia, at that time, was not clear. She had an elevated ferritin and was screened for hemochromatosis, but carries only one gene, which does not typically cause clinical hemochromatosis.  Observation was recommended and her hgb remained stable.  She was then seen by hematology in [place], and numerous blood tests, including an SPEP, B12, Folate, iron studies, Retic ct and haptoglobin were reportedly unrevealing.  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       No F/C/NS. No palpable LAD. No wt loss.      No family  history of anemia.    She was seen for an evaluation at the [facility] [place] [facility]. The hematologic diagnosis of anemia of chronic illness was confirmed. They agreed with not starting supplemental BHASKAR, as her hgb was 9.5 there.      History of Present Illness:  Pt is here for follow up. She feels tired, but the same. No bleeding.     Performance Status:  ECOG 1    Past Medical History:  Past Medical History:    ANEMIA    Anemia    Anemia, unspecified    Anorexia    Anxiety    Arthritis    Back pain    Benign neoplasm of colon    Coronary atherosclerosis    Diarrhea, unspecified    Eye disease    Fatigue    Heart palpitations    Heart valve disease    History of cardiac murmur    History of depression    Lung nodule    Seen on [facility]     Normal cardiac stress test    at [facility]    Osteopenia    Mild.  Dexa scan 4/30/15. Repeat 2 years.    Other and unspecified hyperlipidemia    Other seborrheic keratosis    Pain in joints    Palpitations    PMR (polymyalgia rheumatica) (HCC)    Sicca syndrome (HCC)    Skin lesion    Sputum production    Stress    Unspecified essential hypertension    Wears glasses    Weight loss       Past Surgical History:  Past Surgical History:   Procedure Laterality Date    Colonoscopy  10/05/2006    rectal hyperplastic polyp, diverticulosis, hemorrhoids    Colonoscopy      Colonoscopy,diagnostic  11/29/2012    sigmoid polyp, diverticulosis    Other surgical history      Parotid duct divert,exc submand         Family History:  Family History   Problem Relation Age of Onset    Cancer Father 63        colon @63    Colon Cancer Father         66    Other (Other) Mother         osteoporosis    Other (Other) Sister         osteoporosis    Heart Disorder Maternal Grandmother         MI       Social History:  Social History     Socioeconomic History    Marital status:      Spouse name: Not on file    Number of children: Not on file    Years of education: Not on file    Highest education level: Not on  file   Occupational History    Not on file   Tobacco Use    Smoking status: Never    Smokeless tobacco: Never   Vaping Use    Vaping status: Never Used   Substance and Sexual Activity    Alcohol use: No     Alcohol/week: 0.0 standard drinks of alcohol    Drug use: No    Sexual activity: Not Currently   Other Topics Concern     Service Not Asked    Blood Transfusions Not Asked    Caffeine Concern Yes     Comment: 0-1 cups daily    Occupational Exposure Not Asked    Hobby Hazards Not Asked    Sleep Concern Not Asked    Stress Concern Not Asked    Weight Concern Not Asked    Special Diet Not Asked    Back Care Not Asked    Exercise No    Bike Helmet Not Asked    Seat Belt Not Asked    Self-Exams Not Asked   Social History Narrative    : 1966    Children: 2, misscarriage times 2    Exercise: walks    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[employer]     Caffeine intake: 1-2 Greek coffee/d             Social Drivers of Health     Food Insecurity: No Food Insecurity (8/11/2021)    Received from [facility]    Hunger Vital Sign     Worried About Running Out of Food in the Last Year: Never true     Ran Out of Food in the Last Year: Never true     : Not on file     : Not on file     : Not on file     : Not on file   Transportation Needs: No Transportation Needs (8/11/2021)    Received from [facility]    PRAPARE - Transportation     Lack of Transportation (Medical): No     Lack of Transportation (Non-Medical): No     : Not on file     : Not on file     : Not on file     : Not on file   Housing Stability: Low Risk  (8/11/2021)    Received from [facility]    Housing Stability Vital Sign     Unable to Pay for Housing in the Last Year: No     In the last 12 months, how many places have you lived?: 1     In the last 12 months, was there a time when you did not have a steady place to sleep or slept in a shelter (including now)?: No       Current Medications:    Current Outpatient Medications:     Irbesartan 300 MG Oral Tab, Take 1 tablet  (300 mg total) by mouth nightly., Disp: 90 tablet, Rfl: 3    escitalopram 10 MG Oral Tab, Take 1 tablet (10 mg total) by mouth daily., Disp: 90 tablet, Rfl: 3    clopidogrel 75 MG Oral Tab, Take 1 tablet (75 mg total) by mouth daily., Disp: 90 tablet, Rfl: 3    cycloSPORINE 0.05 % Ophthalmic Emulsion, Place 1 drop into both eyes every 12 (twelve) hours., Disp: , Rfl:     metoprolol succinate ER 25 MG Oral Tablet 24 Hr, Take 1 tablet (25 mg total) by mouth daily., Disp: 90 tablet, Rfl: 3    rosuvastatin 10 MG Oral Tab, Take 1 tablet (10 mg total) by mouth daily., Disp: 90 tablet, Rfl: 3    aspirin 81 MG Oral Tab EC, Take 1 tablet (81 mg total) by mouth daily., Disp: , Rfl: 0    magnesium 30 MG Oral Tab, Take 1 tablet (30 mg total) by mouth twice a week., Disp: , Rfl:     Ascorbic Acid (VITAMIN C) 1000 MG Oral Tab, Take 1 tablet (1,000 mg total) by mouth daily., Disp: , Rfl:     vitamin B-12 50 MCG Oral Tab, Take 1 tablet (50 mcg total) by mouth daily., Disp: , Rfl:     Multiple Vitamins-Minerals (OCUVITE EXTRA) Oral Tab, Take 2 tablets by mouth daily., Disp: , Rfl:     acetaminophen 325 MG Oral Tab, Take 1 tablet (325 mg total) by mouth every 6 (six) hours as needed for Pain., Disp: , Rfl:     Cholecalciferol (VITAMIN D) 1000 UNIT Oral Cap, Take 5,000 Units by mouth. Patient's daughter reports patient is taking Vitamin D 2000 units every other day. , Disp: , Rfl:     CENTRUM SILVER OR TABS, every other day, Disp: , Rfl:     Allergies:  No Known Allergies     Review of Systems:    Constitutional No fevers, chills, night sweats, excessive fatigue or weight loss.   Eyes No significant visual difficulties. No diplopia. No yellowing.   Hematologic/Lymphatic Normal - No easy bruising or bleeding.  No tender or palpable lymph nodes.   Respiratory No dyspnea, pleuritic chest pain, cough or hemoptysis.   Cardiovascular No anginal chest pain, palpitations or orthopnea.   Gastrointestinal No nausea, vomiting, diarrhea, GI  bleeding, or constipation. NL appetite.   Genitorurinary  No hematuria, dysuria, abnormal bleeding, or incontinence.   Integumentary No rashes or yellowing of the skin   Neurologic No headache, blurred vision, and no areas of focal weakness. Normal gait.   Psychiatric No insomnia, depression, martin or mood swings.         Vital Signs:  /64 (BP Location: Left arm, Patient Position: Sitting, Cuff Size: adult)   Pulse 75   Temp 97.2 °F (36.2 °C) (Temporal)   Resp 16   Ht 1.626 m (5' 4.02\")   Wt 63.6 kg (140 lb 3.2 oz)   LMP  (LMP Unknown)   SpO2 95%   BMI 24.05 kg/m²     Physical Examination:    Constitutional Normal - Mood and affect appropriate. Appears close to chronological age. Well nourished. Well developed.   Eyes Normal - Conjunctivae and sclerae are clear and without icterus. Pupils are reactive and equal.   Hematologic/Lymphatic Normal - No petechiae or purpura.  No tender or palpable lymph nodes in the cervical, supraclavicular, axillary or inguinal area.   Respiratory Normal - Lungs are clear to auscultation, no wheezing.   Cardiovascular Normal - Regular rate and rhythm, no murmurs.   Abdomen Normal - Non-tender, non-distended, no masses, ascites or hepatosplenomegaly.    Extremities Normal - No cyanosis, clubbing or edema.    Integumentary Normal - No rashes and noJaundice   Neurologic Normal - No sensory or motor deficits, normal cerebellar function, normal gait, cranial nerves intact.   Psychiatric Normal - A&Ox3. Coherent speech. Verbalizes understanding of our discussions today.       Lab:  Recent Labs     03/03/25  1306   RBC 2.72 L   HGB 8.8 L   HCT 27.2 L   .0   MCH 32.4   MCHC 32.4   RDW 25.8   NEPRELIM 5.20   WBC 8.7   .0       Radiology:    Pathology:    Impression and Plan:  Anemia of chronic illness: With steroid treatment, her hgb returned to 10-10.5, which has been her baseline for the past few years. 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 Her  hgb drifted down to the point where she would benefit from Retacrit to improve her quality of life. Her iron stores were replete. She is tolerating 40,000 units q3 weeks with a hgb 8.5-9.5.    Polymyalgia rheumatica and Sjogrens: Management per rheumatology. She has been weaned from her steroids    Planned Follow Up:  3 months. Q3 week injection    The patient and I have a longitudinal relationship to address/treat this serious and complex condition      Electronically Signed by:    Marco A Wilcox M.D.  [facility]

## 2025-03-06 ENCOUNTER — APPOINTMENT (OUTPATIENT)
Age: 85
End: 2025-03-06
Attending: INTERNAL MEDICINE
Payer: MEDICARE

## 2025-03-07 ENCOUNTER — LAB ENCOUNTER (OUTPATIENT)
Dept: LAB | Age: 85
End: 2025-03-07
Attending: INTERNAL MEDICINE
Payer: MEDICARE

## 2025-03-07 ENCOUNTER — HOSPITAL ENCOUNTER (OUTPATIENT)
Dept: ULTRASOUND IMAGING | Age: 85
Discharge: HOME | End: 2025-03-07
Attending: INTERNAL MEDICINE
Payer: MEDICARE

## 2025-03-07 DIAGNOSIS — Z00.00 ROUTINE GENERAL MEDICAL EXAMINATION AT A HEALTH CARE FACILITY: ICD-10-CM

## 2025-03-07 DIAGNOSIS — I10 PRIMARY HYPERTENSION: ICD-10-CM

## 2025-03-07 DIAGNOSIS — D64.9 ANEMIA, UNSPECIFIED TYPE: ICD-10-CM

## 2025-03-07 DIAGNOSIS — E78.2 MIXED HYPERLIPIDEMIA: ICD-10-CM

## 2025-03-07 DIAGNOSIS — R16.0 LIVER MASS: ICD-10-CM

## 2025-03-07 LAB
ALBUMIN SERPL-MCNC: 4.6 G/DL (ref 3.2–4.8)
ALBUMIN/GLOB SERPL: 1.8 {RATIO} (ref 1–2)
ALP LIVER SERPL-CCNC: 55 U/L
ALT SERPL-CCNC: 20 U/L
ANION GAP SERPL CALC-SCNC: 9 MMOL/L (ref 0–18)
AST SERPL-CCNC: 25 U/L (ref ?–34)
BASOPHILS # BLD AUTO: 0.05 X10(3) UL (ref 0–0.2)
BASOPHILS NFR BLD AUTO: 0.9 %
BILIRUB SERPL-MCNC: 1 MG/DL (ref 0.2–1.1)
BUN BLD-MCNC: 21 MG/DL (ref 9–23)
CALCIUM BLD-MCNC: 9.6 MG/DL (ref 8.7–10.6)
CHLORIDE SERPL-SCNC: 106 MMOL/L (ref 98–112)
CHOLEST SERPL-MCNC: 122 MG/DL (ref ?–200)
CO2 SERPL-SCNC: 27 MMOL/L (ref 21–32)
CREAT BLD-MCNC: 0.79 MG/DL
EGFRCR SERPLBLD CKD-EPI 2021: 74 ML/MIN/1.73M2 (ref 60–?)
EOSINOPHIL # BLD AUTO: 0.39 X10(3) UL (ref 0–0.7)
EOSINOPHIL NFR BLD AUTO: 6.7 %
ERYTHROCYTE [DISTWIDTH] IN BLOOD BY AUTOMATED COUNT: 26.1 %
FASTING PATIENT LIPID ANSWER: YES
FASTING STATUS PATIENT QL REPORTED: YES
GLOBULIN PLAS-MCNC: 2.6 G/DL (ref 2–3.5)
GLUCOSE BLD-MCNC: 94 MG/DL (ref 70–99)
HCT VFR BLD AUTO: 25.9 %
HDLC SERPL-MCNC: 56 MG/DL (ref 40–59)
HGB BLD-MCNC: 8.6 G/DL
IMM GRANULOCYTES # BLD AUTO: 0.02 X10(3) UL (ref 0–1)
IMM GRANULOCYTES NFR BLD: 0.3 %
LDLC SERPL CALC-MCNC: 56 MG/DL (ref ?–100)
LYMPHOCYTES # BLD AUTO: 2.06 X10(3) UL (ref 1–4)
LYMPHOCYTES NFR BLD AUTO: 35.2 %
MCH RBC QN AUTO: 33 PG (ref 26–34)
MCHC RBC AUTO-ENTMCNC: 33.2 G/DL (ref 31–37)
MCV RBC AUTO: 99.2 FL
MONOCYTES # BLD AUTO: 0.55 X10(3) UL (ref 0.1–1)
MONOCYTES NFR BLD AUTO: 9.4 %
NEUTROPHILS # BLD AUTO: 2.78 X10 (3) UL (ref 1.5–7.7)
NEUTROPHILS # BLD AUTO: 2.78 X10(3) UL (ref 1.5–7.7)
NEUTROPHILS NFR BLD AUTO: 47.5 %
NONHDLC SERPL-MCNC: 66 MG/DL (ref ?–130)
OSMOLALITY SERPL CALC.SUM OF ELEC: 297 MOSM/KG (ref 275–295)
PLATELET # BLD AUTO: 332 10(3)UL (ref 150–450)
PLATELET MORPHOLOGY: NORMAL
POTASSIUM SERPL-SCNC: 4.5 MMOL/L (ref 3.5–5.1)
PROT SERPL-MCNC: 7.2 G/DL (ref 5.7–8.2)
RBC # BLD AUTO: 2.61 X10(6)UL
SODIUM SERPL-SCNC: 142 MMOL/L (ref 136–145)
TRIGL SERPL-MCNC: 40 MG/DL (ref 30–149)
VLDLC SERPL CALC-MCNC: 6 MG/DL (ref 0–30)
WBC # BLD AUTO: 5.9 X10(3) UL (ref 4–11)

## 2025-03-07 PROCEDURE — 80061 LIPID PANEL: CPT

## 2025-03-07 PROCEDURE — 80053 COMPREHEN METABOLIC PANEL: CPT

## 2025-03-07 PROCEDURE — 76700 US EXAM ABDOM COMPLETE: CPT | Performed by: INTERNAL MEDICINE

## 2025-03-07 PROCEDURE — 85025 COMPLETE CBC W/AUTO DIFF WBC: CPT

## 2025-03-07 PROCEDURE — 36415 COLL VENOUS BLD VENIPUNCTURE: CPT

## 2025-03-10 ENCOUNTER — CARDPULM VISIT (OUTPATIENT)
Age: 85
End: 2025-03-10
Attending: INTERNAL MEDICINE
Payer: MEDICARE

## 2025-03-10 PROCEDURE — 93798 PHYS/QHP OP CAR RHAB W/ECG: CPT

## 2025-03-11 ENCOUNTER — NURSE TRIAGE (OUTPATIENT)
Dept: INTERNAL MEDICINE CLINIC | Facility: CLINIC | Age: 85
End: 2025-03-11

## 2025-03-11 ENCOUNTER — APPOINTMENT (OUTPATIENT)
Age: 85
End: 2025-03-11
Attending: INTERNAL MEDICINE
Payer: MEDICARE

## 2025-03-11 NOTE — TELEPHONE ENCOUNTER
Action Requested: Summary for Provider     []  Critical Lab, Recommendations Needed  [] Need Additional Advice  []   FYI    []   Need Orders  [] Need Medications Sent to Pharmacy  []  Other     SUMMARY: Called patient regarding results note. She states she and  tested positive for Covid today. She reports mild symptoms, sore throat and headache. Fever last night of 100 something but feeling better today. Denies cough. Patient taking tylenol. Advised patient to continue OTC meds, push fluids, rest, wash hands. Call if any worsening symptoms. She is agreeable to plan.     Reason for call: Covid  Onset: Today     Reason for Disposition   COVID-19 diagnosed by positive lab test (e.g., PCR, rapid self-test kit) and mild symptoms (e.g., cough, fever, others) and no complications or SOB    Protocols used: Coronavirus (COVID-19) Diagnosed or [id number]-QL

## 2025-03-12 ENCOUNTER — APPOINTMENT (OUTPATIENT)
Age: 85
End: 2025-03-12
Attending: INTERNAL MEDICINE
Payer: MEDICARE

## 2025-03-13 ENCOUNTER — APPOINTMENT (OUTPATIENT)
Age: 85
End: 2025-03-13
Attending: INTERNAL MEDICINE
Payer: MEDICARE

## 2025-03-14 ENCOUNTER — APPOINTMENT (OUTPATIENT)
Age: 85
End: 2025-03-14
Attending: INTERNAL MEDICINE
Payer: MEDICARE

## 2025-03-17 ENCOUNTER — APPOINTMENT (OUTPATIENT)
Age: 85
End: 2025-03-17
Attending: INTERNAL MEDICINE
Payer: MEDICARE

## 2025-03-18 ENCOUNTER — APPOINTMENT (OUTPATIENT)
Age: 85
End: 2025-03-18
Attending: INTERNAL MEDICINE
Payer: MEDICARE

## 2025-03-19 ENCOUNTER — APPOINTMENT (OUTPATIENT)
Age: 85
End: 2025-03-19
Attending: INTERNAL MEDICINE
Payer: MEDICARE

## 2025-03-20 ENCOUNTER — APPOINTMENT (OUTPATIENT)
Age: 85
End: 2025-03-20
Attending: INTERNAL MEDICINE
Payer: MEDICARE

## 2025-03-21 ENCOUNTER — APPOINTMENT (OUTPATIENT)
Age: 85
End: 2025-03-21
Attending: INTERNAL MEDICINE
Payer: MEDICARE

## 2025-03-24 ENCOUNTER — APPOINTMENT (OUTPATIENT)
Age: 85
End: 2025-03-24
Attending: INTERNAL MEDICINE
Payer: MEDICARE

## 2025-03-24 ENCOUNTER — OFFICE VISIT (OUTPATIENT)
Age: 85
End: 2025-03-24
Attending: INTERNAL MEDICINE
Payer: MEDICARE

## 2025-03-24 VITALS
WEIGHT: 136.38 LBS | HEIGHT: 64.02 IN | SYSTOLIC BLOOD PRESSURE: 139 MMHG | TEMPERATURE: 97 F | DIASTOLIC BLOOD PRESSURE: 64 MMHG | HEART RATE: 73 BPM | BODY MASS INDEX: 23.28 KG/M2 | OXYGEN SATURATION: 99 % | RESPIRATION RATE: 16 BRPM

## 2025-03-24 DIAGNOSIS — D63.8 ANEMIA OF CHRONIC DISEASE: Primary | ICD-10-CM

## 2025-03-24 LAB — HGB BLD-MCNC: 9.6 G/DL

## 2025-03-24 NOTE — PROGRESS NOTES
Education Record    Learner:  Patient    Disease / Diagnosis: Anemia of CKD    Barriers / Limitations:  None   Comments:    Method:  Brief focused and Reinforcement   Comments:    General Topics:  Plan of care reviewed   Comments:    Outcome:  Shows understanding   Comments:    Hgb 9.6, Procrit given. Patient tolerated well and discharged in stable condition.

## 2025-03-25 ENCOUNTER — APPOINTMENT (OUTPATIENT)
Age: 85
End: 2025-03-25
Attending: INTERNAL MEDICINE
Payer: MEDICARE

## 2025-03-26 ENCOUNTER — CARDPULM VISIT (OUTPATIENT)
Age: 85
End: 2025-03-26
Attending: INTERNAL MEDICINE
Payer: MEDICARE

## 2025-03-26 PROCEDURE — 93798 PHYS/QHP OP CAR RHAB W/ECG: CPT

## 2025-03-27 ENCOUNTER — APPOINTMENT (OUTPATIENT)
Age: 85
End: 2025-03-27
Attending: INTERNAL MEDICINE
Payer: MEDICARE

## 2025-03-28 ENCOUNTER — APPOINTMENT (OUTPATIENT)
Age: 85
End: 2025-03-28
Attending: INTERNAL MEDICINE
Payer: MEDICARE

## 2025-03-28 ENCOUNTER — HOSPITAL ENCOUNTER (OUTPATIENT)
Dept: GENERAL RADIOLOGY | Age: 85
Discharge: HOME | End: 2025-03-28
Attending: INTERNAL MEDICINE
Payer: MEDICARE

## 2025-03-28 ENCOUNTER — NURSE TRIAGE (OUTPATIENT)
Dept: INTERNAL MEDICINE CLINIC | Facility: CLINIC | Age: 85
End: 2025-03-28

## 2025-03-28 ENCOUNTER — CARDPULM VISIT (OUTPATIENT)
Age: 85
End: 2025-03-28
Attending: INTERNAL MEDICINE
Payer: MEDICARE

## 2025-03-28 ENCOUNTER — OFFICE VISIT (OUTPATIENT)
Dept: INTERNAL MEDICINE CLINIC | Facility: CLINIC | Age: 85
End: 2025-03-28
Payer: MEDICARE

## 2025-03-28 VITALS
OXYGEN SATURATION: 94 % | WEIGHT: 138.13 LBS | HEART RATE: 76 BPM | BODY MASS INDEX: 23.58 KG/M2 | TEMPERATURE: 97 F | HEIGHT: 64.02 IN | DIASTOLIC BLOOD PRESSURE: 64 MMHG | SYSTOLIC BLOOD PRESSURE: 124 MMHG | RESPIRATION RATE: 16 BRPM

## 2025-03-28 DIAGNOSIS — G89.29 CHRONIC NECK PAIN: ICD-10-CM

## 2025-03-28 DIAGNOSIS — M54.50 ACUTE RIGHT-SIDED LOW BACK PAIN WITHOUT SCIATICA: ICD-10-CM

## 2025-03-28 DIAGNOSIS — I10 PRIMARY HYPERTENSION: ICD-10-CM

## 2025-03-28 DIAGNOSIS — M54.50 ACUTE RIGHT-SIDED LOW BACK PAIN WITHOUT SCIATICA: Primary | ICD-10-CM

## 2025-03-28 DIAGNOSIS — M54.2 CHRONIC NECK PAIN: ICD-10-CM

## 2025-03-28 PROCEDURE — 99214 OFFICE O/P EST MOD 30 MIN: CPT | Performed by: INTERNAL MEDICINE

## 2025-03-28 PROCEDURE — 72100 X-RAY EXAM L-S SPINE 2/3 VWS: CPT | Performed by: INTERNAL MEDICINE

## 2025-03-28 PROCEDURE — 72040 X-RAY EXAM NECK SPINE 2-3 VW: CPT | Performed by: INTERNAL MEDICINE

## 2025-03-28 PROCEDURE — 73502 X-RAY EXAM HIP UNI 2-3 VIEWS: CPT | Performed by: INTERNAL MEDICINE

## 2025-03-28 PROCEDURE — G2211 COMPLEX E/M VISIT ADD ON: HCPCS | Performed by: INTERNAL MEDICINE

## 2025-03-28 PROCEDURE — 1159F MED LIST DOCD IN RCRD: CPT | Performed by: INTERNAL MEDICINE

## 2025-03-28 RX ORDER — IBUPROFEN 400 MG/1
400 TABLET, FILM COATED ORAL AS NEEDED
COMMUNITY

## 2025-03-28 NOTE — PROGRESS NOTES
Karolina Contreras  9/18/1940    Chief Complaint   Patient presents with    Hip Pain     EC Room 14     SUBJECTIVE   Karolina Contreras is a 84 year old female who presents for evaluation of right hip and buttock pain for the last 10 days.    The pain does not radiate into the leg. She denies tingling, numbness or weakness in the ipsilateral leg.  Worse when changing position from sitting to standing and vice versa. She is using  a walker today because she does not want to fall. No significant pain when putting weight on the leg.    She denies any recents falls or trauma.    She had pain in her cervical spine that resolved. She had surgery in her neck years ago and would like to get XR. She had a benign tissue mass removed.    Review of Systems   Review of Systems   No f/c/chest pain or sob. No cough. No abd pain/n/v/d. No ha or dizziness. No numbness, tingling, or weakness.     OBJECTIVE:   /64   Pulse 76   Temp 96.7 °F (35.9 °C) (Temporal)   Resp 16   Ht 5' 4.02\" (1.626 m)   Wt 138 lb 2 oz (62.7 kg)   LMP  (LMP Unknown)   SpO2 94%   BMI 23.69 kg/m²   Physical Exam   Constitutional: Oriented to person, place, and time. No distress.   Cardiovascular: Normal rate, regular rhythm and intact distal pulses.  No murmur, rubs or gallops.   Pulmonary/Chest: Effort normal and breath sounds normal. No respiratory distress.  Musculoskeletal: Patient unable to sit on examination table so exam was somewhat limited. No edema in either lower extremity. No tenderness of thoracic or lumbar spinous processes. Tenderness in right gluteus on poss ischial tuberosity. Slight tenderness over right greater trochanter.     Lab Results   Component Value Date    GLU 94 03/07/2025    BUN 21 03/07/2025    CREATSERUM 0.79 03/07/2025    BUNCREA 24.4 (H) 06/21/2021    ANIONGAP 9 03/07/2025    GFRAA 86 06/13/2022    GFRNAA 75 06/13/2022    CA 9.6 03/07/2025     03/07/2025    K 4.5 03/07/2025     03/07/2025     CO2 27.0 03/07/2025    OSMOCALC 297 (H) 03/07/2025      Lab Results   Component Value Date    WBC 5.9 03/07/2025    RBC 2.61 (L) 03/07/2025    HGB 9.6 (L) 03/24/2025    HCT 25.9 (L) 03/07/2025    MCV 99.2 03/07/2025    MCH 33.0 03/07/2025    MCHC 33.2 03/07/2025    RDW 26.1 03/07/2025    .0 03/07/2025    MPV 10.1 (H) 09/11/2012      Lab Results   Component Value Date    T4F 0.9 08/18/2017    TSH 1.580 02/13/2024    TSHT4 1.67 10/25/2019      ASSESSMENT AND PLAN:       ICD-10-CM    1. Acute right-sided low back pain without sciatica  M54.50 XR LUMBAR SPINE (MIN 2 VIEWS) (CPT=72100)     Physical Therapy Referral - [facility]     CANCELED: XR HIP + PELVIS MIN 4 VIEWS RIGHT (CPT=73503)      2. Chronic neck pain  M54.2 XR CERVICAL SPINE (2-3 VIEWS) (CPT=72040)    G89.29 Physical Therapy Referral - [facility]      3. Primary hypertension  I10       The patient has been in cardiac rehab for the last 1 month. It's possible that this exercises has exacerbated arthritis or bursitis. Will check radiographs of lumbar spine, hip and pelvis. Can continue NSAID PRN  for pain. Instructed to apply ice and do some gentle stretches at home, printed for patient. She is going to call on 3/31 with symptom update. If no improvement consider PM&R referral. She has poss history of PMR but unlikely etiology of pain at this time. BP is well controlled in office today, continue current regimen.    TODAY'S ORDERS     No orders of the defined types were placed in this encounter.      Meds & Refills:  Requested Prescriptions      No prescriptions requested or ordered in this encounter       Imaging & Consults:  None    No follow-ups on file.  There are no Patient Instructions on file for this visit.    All questions were answered and the patient agrees with the plan.     Thank you,  Chaya Schmidt, DO

## 2025-03-28 NOTE — TELEPHONE ENCOUNTER
Action Requested: Summary for Provider     []  Critical Lab, Recommendations Needed  [] Need Additional Advice  []   FYI    []   Need Orders  [] Need Medications Sent to Pharmacy  []  Other     SUMMARY: Patient called for xray order. She states her right hip started hurting a few days ago. Was tolerable yesterday, but woke this morning with severe pain. Hard to walk or bear weight. Ibuprofen helps a little bit. Denies any injury. Appointment scheduled for patient today for further evaluation. She confirms understanding.     Reason for call: Hip Pain  Onset: A few days ago     Reason for Disposition   SEVERE pain (e.g., excruciating, unable to do any normal activities)    Protocols used: Hip Pain-A-OH

## 2025-03-31 ENCOUNTER — APPOINTMENT (OUTPATIENT)
Age: 85
End: 2025-03-31
Attending: INTERNAL MEDICINE
Payer: MEDICARE

## 2025-04-01 ENCOUNTER — APPOINTMENT (OUTPATIENT)
Age: 85
End: 2025-04-01
Attending: INTERNAL MEDICINE
Payer: MEDICARE

## 2025-04-02 ENCOUNTER — APPOINTMENT (OUTPATIENT)
Age: 85
End: 2025-04-02
Attending: INTERNAL MEDICINE
Payer: MEDICARE

## 2025-04-03 ENCOUNTER — APPOINTMENT (OUTPATIENT)
Age: 85
End: 2025-04-03
Attending: INTERNAL MEDICINE
Payer: MEDICARE

## 2025-04-04 ENCOUNTER — CARDPULM VISIT (OUTPATIENT)
Age: 85
End: 2025-04-04
Attending: INTERNAL MEDICINE
Payer: MEDICARE

## 2025-04-04 PROCEDURE — 93798 PHYS/QHP OP CAR RHAB W/ECG: CPT

## 2025-04-07 ENCOUNTER — CARDPULM VISIT (OUTPATIENT)
Age: 85
End: 2025-04-07
Attending: INTERNAL MEDICINE
Payer: MEDICARE

## 2025-04-07 ENCOUNTER — OFFICE VISIT (OUTPATIENT)
Dept: NEUROLOGY | Facility: CLINIC | Age: 85
End: 2025-04-07
Payer: MEDICARE

## 2025-04-07 VITALS
SYSTOLIC BLOOD PRESSURE: 124 MMHG | BODY MASS INDEX: 25.61 KG/M2 | OXYGEN SATURATION: 93 % | HEIGHT: 64 IN | HEART RATE: 77 BPM | WEIGHT: 150 LBS | RESPIRATION RATE: 16 BRPM | DIASTOLIC BLOOD PRESSURE: 50 MMHG

## 2025-04-07 DIAGNOSIS — R26.9 GAIT ABNORMALITY: Primary | ICD-10-CM

## 2025-04-07 DIAGNOSIS — R20.9 DISTURBANCE OF SKIN SENSATION: ICD-10-CM

## 2025-04-07 PROCEDURE — 93798 PHYS/QHP OP CAR RHAB W/ECG: CPT

## 2025-04-07 PROCEDURE — 1159F MED LIST DOCD IN RCRD: CPT | Performed by: OTHER

## 2025-04-07 PROCEDURE — 99204 OFFICE O/P NEW MOD 45 MIN: CPT | Performed by: OTHER

## 2025-04-07 NOTE — H&P
Neurology H&P    Karolina Contreras Patient Status:  No patient class for patient encounter    1940 MRN [id number]   Location [facility], [facility] Attending No att. providers found   Hosp Day # 0 PCP Ayad Chowdhury MD     Subjective:  Karolina Conterras is a(n) 84 year old female with a PMH significant for anxiety, anemia, HTN, HL and chronic gait imbalance. She comes in neurology clinic for evaluation of chronic ataxia and gait imbalance.  She has been seen by neurologist both here at [facility] for the symptoms and the [facility] as well for the symptoms.  She has had 2 previous EMGs.  These are noted below. She states that she has had problems with feeling of leg heaviness and gait imbalance for at least 10 years. She states that she feels that her legs are heavy and that she cannot walk right. She states she always has pain in her legs. She stets that she cannot really tell me where her pain is but that her calves do hurt sometimes. She states that she has a lot of problems with her back and has had back pain for about 40 years. She denies any radiating low back pains. She sometimes has numbness int he feet. She sometimes gets cramping in the toes or distal feet. She also states that a long time ago she had numbness int he L biceps. She does not know about her family history of neuropathy She states that she last fell about 5 years ago. She She denies any numbness or tingling in the UEs and denies any neck pains.      Current Medications:  Current Outpatient Medications   Medication Sig Dispense Refill    ibuprofen 400 MG Oral Tab Take 1 tablet (400 mg total) by mouth as needed for Pain.      Irbesartan 300 MG Oral Tab Take 1 tablet (300 mg total) by mouth nightly. 90 tablet 3    escitalopram 10 MG Oral Tab Take 1 tablet (10 mg total) by mouth daily. 90 tablet 3    clopidogrel 75 MG Oral Tab Take 1 tablet (75 mg total) by mouth daily. 90 tablet 3     cycloSPORINE 0.05 % Ophthalmic Emulsion Place 1 drop into both eyes every 12 (twelve) hours.      metoprolol succinate ER 25 MG Oral Tablet 24 Hr Take 1 tablet (25 mg total) by mouth daily. 90 tablet 3    rosuvastatin 10 MG Oral Tab Take 1 tablet (10 mg total) by mouth daily. 90 tablet 3    aspirin 81 MG Oral Tab EC Take 1 tablet (81 mg total) by mouth daily.  0    magnesium 30 MG Oral Tab Take 1 tablet (30 mg total) by mouth twice a week.      Ascorbic Acid (VITAMIN C) 1000 MG Oral Tab Take 1 tablet (1,000 mg total) by mouth daily.      vitamin B-12 50 MCG Oral Tab Take 1 tablet (50 mcg total) by mouth daily.      Multiple Vitamins-Minerals (OCUVITE EXTRA) Oral Tab Take 2 tablets by mouth daily.      acetaminophen 325 MG Oral Tab Take 1 tablet (325 mg total) by mouth every 6 (six) hours as needed for Pain. (Patient not taking: Reported on 3/28/2025)      Cholecalciferol (VITAMIN D) 1000 UNIT Oral Cap Take 5,000 Units by mouth. Patient's daughter reports patient is taking Vitamin D 2000 units every other day.       CENTRUM SILVER OR TABS every other day         Problem List:  Patient Active Problem List   Diagnosis    Vitamin D deficiency    Sicca syndrome (HCC)    Mixed hyperlipidemia    Fatigue    Anemia    Hepatic hemangioma    HTN (hypertension)    Sjogren's syndrome without extraglandular involvement (HCC)    diverticulosis    Second degree hemorrhoids    Anxiety    Anemia of chronic disease    Autoimmune disorder (HCC)       PMHx:  Past Medical History:    ANEMIA    Anemia    Anemia, unspecified    Anorexia    Anxiety    Arthritis    Back pain    Benign neoplasm of colon    Coronary atherosclerosis    Diarrhea, unspecified    Eye disease    Fatigue    Heart palpitations    Heart valve disease    History of cardiac murmur    History of depression    Lung nodule    Seen on [facility]     Normal cardiac stress test    at [facility]    Osteopenia    Mild.  Dexa scan 4/30/15. Repeat 2 years.    Other and unspecified  hyperlipidemia    Other seborrheic keratosis    Pain in joints    Palpitations    PMR (polymyalgia rheumatica) (HCC)    Sicca syndrome (HCC)    Skin lesion    Sputum production    Stress    Unspecified essential hypertension    Wears glasses    Weight loss       PSHx:  Past Surgical History:   Procedure Laterality Date    Colonoscopy  10/05/2006    rectal hyperplastic polyp, diverticulosis, hemorrhoids    Colonoscopy      Colonoscopy,diagnostic  11/29/2012    sigmoid polyp, diverticulosis    Other surgical history      Parotid duct divert,exc submand         SocHx:  Social History     Socioeconomic History    Marital status:    Tobacco Use    Smoking status: Never    Smokeless tobacco: Never   Vaping Use    Vaping status: Never Used   Substance and Sexual Activity    Alcohol use: No     Alcohol/week: 0.0 standard drinks of alcohol    Drug use: No    Sexual activity: Not Currently   Other Topics Concern    Caffeine Concern Yes     Comment: 0-1 cups daily    Exercise No   Social History Narrative    : 1966    Children: 2, misscarriage times 2    Exercise: walks    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    Hunger Vital Sign     Worried About Running Out of Food in the Last Year: Never true     Ran Out of Food in the Last Year: Never true   Transportation Needs: No Transportation Needs (8/11/2021)    Received from [facility]    PRAPARE - Transportation     Lack of Transportation (Medical): No     Lack of Transportation (Non-Medical): No   Stress: No Stress Concern Present (8/11/2021)    Received from [facility]    [place] [employer] - Occupational Stress Questionnaire     Feeling of Stress : Only a little   Housing Stability: Low Risk  (8/11/2021)    Received from [facility]    Housing Stability Vital Sign     Unable to Pay for Housing in  the Last Year: No     In the last 12 months, how many places have you lived?: 1     In the last 12 months, was there a time when you did not have a steady place to sleep or slept in a shelter (including now)?: No       Family History:  Family History   Problem Relation Age of Onset    Cancer Father 63        colon @63    Colon Cancer Father         66    Other (Other) Mother         osteoporosis    Other (Other) Sister         osteoporosis    Heart Disorder Maternal Grandmother         MI           ROS:  10 point ROS completed and was negative, except for pertinent positive and negatives stated in subjective.    Objective/Physical Exam:    Vital Signs:  not currently breastfeeding.    Gen: Awake and in no apparent distress  HEENT: moist mucus membranes  Neck: Supple  Cardiovascular: Regular rate and rhythm, no murmur  Pulm: CTAB  GI: non-tender, normal bowel sounds  Skin: normal, dry  Extremities: No clubbing or cyanosis      Neurologic:   MENTAL STATUS: alert, ox3, normal attention, language and fund of knowledge.      CRANIAL NERVES II to XII: PERRLA, no ptosis or diplopia, EOM intact, facial sensation intact, strong eye closure, face is symmetric, no dysarthria, tongue midline,  no tongue fasciculations or atrophy, strong shoulder shrug.    MOTOR EXAMINATION:   MSK:  RUE: Delt:  5/5, Bi: 5/5, Tri: 5/5, : 5/5   LUE: Delt:  5/5, Bi: 5/5, Tri: 5/5, : 5/5   RLE: HF: 4+/5, KE: 5/5, KF: 5/5, DF: 5/5, PF: 5/5   LLE:  HF: 4/5, KE: 5/5, KF: 5/5, DF: 5/5, PF: 5/5   Normal Tone  No Fasiculations      SENSORY EXAMINATION:  UE: intact to light touch, pinprick intact  LE: intact to light touch, pinprick intact    COORDINATION:  No dysmetria, or intention tremors     REFLEXES: 2+ at biceps, 2+ brachioradialis, 2+ at patella, 2+ at the ankles     GAIT: slightly stopped posture, waddling gait (hip pain?)    Romberg's: negative        Labs:       Imaging:  EMG/NCS 2021 [place]  CLINICAL INTERPRETATION: The changes seen on  this study were subtle but could be consistent with a   mild proximal myopathy affecting the lower limb versus changes secondary to deconditioning,   disuse, or prolonged corticosteroid use (i.e. type 2 fiber atrophy).    I have reviewed the   findings of the examining physician and agree with the interpretation.     EMG/NCS 2015  IMPRESSION:  This is an abnormal study.  There are 2 findings:  1.  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  2.  Superimposed chronic mild bilateral L4-S1 radiculopathy, chronic in nature and to a mild degree.  Correlation with lumbar spine imaging studies may be helpful to the patient.  However, there is no evidence of motor neuropathy demonstrated at this time.  Decreased motor amplitude in left tibia and left common peroneal may be related to the lumbar root chronic irritation.  Clinical correlation is indicated.  There is no evidence of myopathy demonstrated at this time.     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  Her exam as noted above today.  She has very mild weakness in the hip flexors.  She is sort of waddling gait and does endorse some hip pain.  She tells me that she was diagnosed with a lumbar radiculopathy 40 years ago was told that she needed surgery on her back but declined to do this at that time.  I again one of her previous EMGs was noted to have a mild lower lumbar radiculopathy and also an axonal neuropathy in the legs.  Repeat EMG did not demonstrate any neuropathy.  I will order a third EMG as her last one was done 3 years ago.  I will send her to physical therapy for balance.  Also get blood work as noted below to evaluate for any other possible causes of neuropathy.  Previous workup for myopathy appears to have been negative per chart review.   CPK is always within normal limits.  As for her [facility] EMG only had very mild findings and not certain that they would suggest a myopathy at this time.  She does get muscle cramping but denies any upper leg pains.  Most of her pain seems to be distal.  Possibly her gait imbalance is due to a mixture of deconditioning and neuropathy along with a lumbar radiculopathy.  I did offer an MRI of the lumbar spine to evaluate for any radiculopathy but she refuses any MRI.  She does consent to a CT scan of the lumbar spine.  This will be ordered as well.       Plan:  Gait imbalance, paresthesias in the feet  - She refuses any MRI evaluation  - CT of the L spine  - EMG/NCS BLE  - B12, TSH, B6, SPEP/IF, MADELEINE, CPK  - PT referral    Ilan Murphy, DO  Neurology

## 2025-04-08 ENCOUNTER — APPOINTMENT (OUTPATIENT)
Age: 85
End: 2025-04-08
Attending: INTERNAL MEDICINE
Payer: MEDICARE

## 2025-04-09 ENCOUNTER — APPOINTMENT (OUTPATIENT)
Age: 85
End: 2025-04-09
Attending: INTERNAL MEDICINE
Payer: MEDICARE

## 2025-04-10 ENCOUNTER — APPOINTMENT (OUTPATIENT)
Age: 85
End: 2025-04-10
Attending: INTERNAL MEDICINE
Payer: MEDICARE

## 2025-04-11 ENCOUNTER — CARDPULM VISIT (OUTPATIENT)
Age: 85
End: 2025-04-11
Attending: INTERNAL MEDICINE
Payer: MEDICARE

## 2025-04-11 PROCEDURE — 93798 PHYS/QHP OP CAR RHAB W/ECG: CPT

## 2025-04-14 ENCOUNTER — OFFICE VISIT (OUTPATIENT)
Age: 85
End: 2025-04-14
Attending: INTERNAL MEDICINE
Payer: MEDICARE

## 2025-04-14 ENCOUNTER — CARDPULM VISIT (OUTPATIENT)
Age: 85
End: 2025-04-14
Attending: INTERNAL MEDICINE
Payer: MEDICARE

## 2025-04-14 DIAGNOSIS — D63.8 ANEMIA OF CHRONIC DISEASE: Primary | ICD-10-CM

## 2025-04-14 PROCEDURE — 93798 PHYS/QHP OP CAR RHAB W/ECG: CPT

## 2025-04-15 ENCOUNTER — OFFICE VISIT (OUTPATIENT)
Age: 85
End: 2025-04-15
Attending: INTERNAL MEDICINE
Payer: MEDICARE

## 2025-04-15 ENCOUNTER — APPOINTMENT (OUTPATIENT)
Age: 85
End: 2025-04-15
Attending: INTERNAL MEDICINE
Payer: MEDICARE

## 2025-04-15 DIAGNOSIS — D63.8 ANEMIA OF CHRONIC DISEASE: Primary | ICD-10-CM

## 2025-04-15 LAB — HGB BLD-MCNC: 9.2 G/DL (ref 12–16)

## 2025-04-15 NOTE — PROGRESS NOTES
Education Record    Learner:  Patient    Disease / Diagnosis: Anemia of CKD    Barriers / Limitations:  None   Comments:    Method:  Brief focused and Reinforcement   Comments:    General Topics:  Plan of care reviewed   Comments:    Outcome:  Shows understanding   Comments:    Hgb 9.2 - Retacrit given. Patient will RTC in 3 weeks.

## 2025-04-16 ENCOUNTER — CARDPULM VISIT (OUTPATIENT)
Age: 85
End: 2025-04-16
Attending: INTERNAL MEDICINE
Payer: MEDICARE

## 2025-04-16 PROCEDURE — 93798 PHYS/QHP OP CAR RHAB W/ECG: CPT

## 2025-04-17 ENCOUNTER — APPOINTMENT (OUTPATIENT)
Age: 85
End: 2025-04-17
Attending: INTERNAL MEDICINE
Payer: MEDICARE

## 2025-04-18 ENCOUNTER — CARDPULM VISIT (OUTPATIENT)
Age: 85
End: 2025-04-18
Attending: INTERNAL MEDICINE
Payer: MEDICARE

## 2025-04-18 PROCEDURE — 93798 PHYS/QHP OP CAR RHAB W/ECG: CPT

## 2025-04-21 ENCOUNTER — CARDPULM VISIT (OUTPATIENT)
Age: 85
End: 2025-04-21
Attending: INTERNAL MEDICINE
Payer: MEDICARE

## 2025-04-21 PROCEDURE — 93798 PHYS/QHP OP CAR RHAB W/ECG: CPT

## 2025-04-22 ENCOUNTER — APPOINTMENT (OUTPATIENT)
Age: 85
End: 2025-04-22
Attending: INTERNAL MEDICINE
Payer: MEDICARE

## 2025-04-23 ENCOUNTER — CARDPULM VISIT (OUTPATIENT)
Age: 85
End: 2025-04-23
Attending: INTERNAL MEDICINE
Payer: MEDICARE

## 2025-04-23 PROCEDURE — 93798 PHYS/QHP OP CAR RHAB W/ECG: CPT

## 2025-04-24 ENCOUNTER — APPOINTMENT (OUTPATIENT)
Age: 85
End: 2025-04-24
Attending: INTERNAL MEDICINE
Payer: MEDICARE

## 2025-04-25 ENCOUNTER — CARDPULM VISIT (OUTPATIENT)
Age: 85
End: 2025-04-25
Attending: INTERNAL MEDICINE
Payer: MEDICARE

## 2025-04-25 PROCEDURE — 93798 PHYS/QHP OP CAR RHAB W/ECG: CPT

## 2025-04-28 ENCOUNTER — CARDPULM VISIT (OUTPATIENT)
Age: 85
End: 2025-04-28
Attending: INTERNAL MEDICINE
Payer: MEDICARE

## 2025-04-28 PROCEDURE — 93798 PHYS/QHP OP CAR RHAB W/ECG: CPT

## 2025-04-29 ENCOUNTER — APPOINTMENT (OUTPATIENT)
Age: 85
End: 2025-04-29
Attending: INTERNAL MEDICINE
Payer: MEDICARE

## 2025-04-30 ENCOUNTER — CARDPULM VISIT (OUTPATIENT)
Age: 85
End: 2025-04-30
Attending: INTERNAL MEDICINE
Payer: MEDICARE

## 2025-04-30 PROCEDURE — 93798 PHYS/QHP OP CAR RHAB W/ECG: CPT

## 2025-05-01 ENCOUNTER — APPOINTMENT (OUTPATIENT)
Age: 85
End: 2025-05-01
Attending: INTERNAL MEDICINE
Payer: MEDICARE

## 2025-05-02 ENCOUNTER — CARDPULM VISIT (OUTPATIENT)
Age: 85
End: 2025-05-02
Attending: INTERNAL MEDICINE
Payer: MEDICARE

## 2025-05-02 PROCEDURE — 93798 PHYS/QHP OP CAR RHAB W/ECG: CPT

## 2025-05-05 ENCOUNTER — APPOINTMENT (OUTPATIENT)
Age: 85
End: 2025-05-05
Attending: INTERNAL MEDICINE
Payer: MEDICARE

## 2025-05-06 ENCOUNTER — APPOINTMENT (OUTPATIENT)
Age: 85
End: 2025-05-06
Attending: INTERNAL MEDICINE
Payer: MEDICARE

## 2025-05-06 ENCOUNTER — OFFICE VISIT (OUTPATIENT)
Age: 85
End: 2025-05-06
Attending: INTERNAL MEDICINE
Payer: MEDICARE

## 2025-05-06 VITALS
DIASTOLIC BLOOD PRESSURE: 54 MMHG | RESPIRATION RATE: 16 BRPM | SYSTOLIC BLOOD PRESSURE: 140 MMHG | TEMPERATURE: 98 F | HEART RATE: 6 BPM | OXYGEN SATURATION: 96 %

## 2025-05-06 DIAGNOSIS — D63.8 ANEMIA OF CHRONIC DISEASE: Primary | ICD-10-CM

## 2025-05-06 LAB — HGB BLD-MCNC: 8.9 G/DL (ref 12–16)

## 2025-05-06 NOTE — PROGRESS NOTES
Education Record    Learner:  Patient    Disease / Diagnosis: Anemia of chronic disease    Barriers / Limitations:  None   Comments:    Method:  Brief focused and Reinforcement   Comments:    General Topics:  Medication, Side effects and symptom management, Plan of care reviewed, and Fall risk and prevention   Comments:    Outcome:  Shows understanding   Comments:  Hgb at 8.9 - Procrit 40,000 units given subcutaneously.

## 2025-05-07 ENCOUNTER — CARDPULM VISIT (OUTPATIENT)
Age: 85
End: 2025-05-07
Attending: INTERNAL MEDICINE
Payer: MEDICARE

## 2025-05-07 ENCOUNTER — TELEPHONE (OUTPATIENT)
Dept: INTERNAL MEDICINE CLINIC | Facility: CLINIC | Age: 85
End: 2025-05-07

## 2025-05-07 PROCEDURE — 93798 PHYS/QHP OP CAR RHAB W/ECG: CPT

## 2025-05-07 NOTE — TELEPHONE ENCOUNTER
Received an eye exam from [facility] for very dry and irritated eyes on 05/05/2025. Placed in  bin for review.

## 2025-05-08 ENCOUNTER — APPOINTMENT (OUTPATIENT)
Age: 85
End: 2025-05-08
Attending: INTERNAL MEDICINE
Payer: MEDICARE

## 2025-05-09 ENCOUNTER — CARDPULM VISIT (OUTPATIENT)
Age: 85
End: 2025-05-09
Attending: INTERNAL MEDICINE
Payer: MEDICARE

## 2025-05-09 PROCEDURE — 93798 PHYS/QHP OP CAR RHAB W/ECG: CPT

## 2025-05-12 ENCOUNTER — CARDPULM VISIT (OUTPATIENT)
Age: 85
End: 2025-05-12
Attending: INTERNAL MEDICINE
Payer: MEDICARE

## 2025-05-12 PROCEDURE — 93798 PHYS/QHP OP CAR RHAB W/ECG: CPT

## 2025-05-13 ENCOUNTER — APPOINTMENT (OUTPATIENT)
Age: 85
End: 2025-05-13
Attending: INTERNAL MEDICINE
Payer: MEDICARE

## 2025-05-14 ENCOUNTER — CARDPULM VISIT (OUTPATIENT)
Age: 85
End: 2025-05-14
Attending: INTERNAL MEDICINE
Payer: MEDICARE

## 2025-05-14 PROCEDURE — 93798 PHYS/QHP OP CAR RHAB W/ECG: CPT

## 2025-05-15 ENCOUNTER — APPOINTMENT (OUTPATIENT)
Age: 85
End: 2025-05-15
Attending: INTERNAL MEDICINE
Payer: MEDICARE

## 2025-05-16 ENCOUNTER — CARDPULM VISIT (OUTPATIENT)
Age: 85
End: 2025-05-16
Attending: INTERNAL MEDICINE
Payer: MEDICARE

## 2025-05-16 PROCEDURE — 93798 PHYS/QHP OP CAR RHAB W/ECG: CPT

## 2025-05-19 ENCOUNTER — CARDPULM VISIT (OUTPATIENT)
Age: 85
End: 2025-05-19
Attending: INTERNAL MEDICINE
Payer: MEDICARE

## 2025-05-19 PROCEDURE — 93798 PHYS/QHP OP CAR RHAB W/ECG: CPT

## 2025-05-20 ENCOUNTER — APPOINTMENT (OUTPATIENT)
Age: 85
End: 2025-05-20
Attending: INTERNAL MEDICINE
Payer: MEDICARE

## 2025-05-21 ENCOUNTER — TELEPHONE (OUTPATIENT)
Facility: LOCATION | Age: 85
End: 2025-05-21

## 2025-05-21 ENCOUNTER — CARDPULM VISIT (OUTPATIENT)
Age: 85
End: 2025-05-21
Attending: INTERNAL MEDICINE
Payer: MEDICARE

## 2025-05-21 PROCEDURE — 93798 PHYS/QHP OP CAR RHAB W/ECG: CPT

## 2025-05-22 ENCOUNTER — APPOINTMENT (OUTPATIENT)
Age: 85
End: 2025-05-22
Attending: INTERNAL MEDICINE
Payer: MEDICARE

## 2025-05-23 ENCOUNTER — CARDPULM VISIT (OUTPATIENT)
Age: 85
End: 2025-05-23
Attending: INTERNAL MEDICINE
Payer: MEDICARE

## 2025-05-23 PROCEDURE — 93798 PHYS/QHP OP CAR RHAB W/ECG: CPT

## 2025-05-26 ENCOUNTER — APPOINTMENT (OUTPATIENT)
Age: 85
End: 2025-05-26
Attending: INTERNAL MEDICINE
Payer: MEDICARE

## 2025-05-27 ENCOUNTER — OFFICE VISIT (OUTPATIENT)
Age: 85
End: 2025-05-27
Attending: INTERNAL MEDICINE
Payer: MEDICARE

## 2025-05-27 ENCOUNTER — APPOINTMENT (OUTPATIENT)
Age: 85
End: 2025-05-27
Attending: INTERNAL MEDICINE
Payer: MEDICARE

## 2025-05-27 VITALS
DIASTOLIC BLOOD PRESSURE: 69 MMHG | WEIGHT: 141 LBS | TEMPERATURE: 98 F | HEART RATE: 72 BPM | RESPIRATION RATE: 16 BRPM | BODY MASS INDEX: 24.07 KG/M2 | SYSTOLIC BLOOD PRESSURE: 150 MMHG | HEIGHT: 64.02 IN | OXYGEN SATURATION: 94 %

## 2025-05-27 DIAGNOSIS — D63.8 ANEMIA OF CHRONIC DISEASE: Primary | ICD-10-CM

## 2025-05-27 LAB — HGB BLD-MCNC: 8.8 G/DL (ref 12–16)

## 2025-05-28 ENCOUNTER — CARDPULM VISIT (OUTPATIENT)
Age: 85
End: 2025-05-28
Attending: INTERNAL MEDICINE
Payer: MEDICARE

## 2025-05-28 PROCEDURE — 93798 PHYS/QHP OP CAR RHAB W/ECG: CPT

## 2025-05-29 ENCOUNTER — APPOINTMENT (OUTPATIENT)
Age: 85
End: 2025-05-29
Attending: INTERNAL MEDICINE
Payer: MEDICARE

## 2025-05-30 ENCOUNTER — CARDPULM VISIT (OUTPATIENT)
Age: 85
End: 2025-05-30
Attending: INTERNAL MEDICINE
Payer: MEDICARE

## 2025-05-30 PROCEDURE — 93798 PHYS/QHP OP CAR RHAB W/ECG: CPT

## 2025-06-02 ENCOUNTER — CARDPULM VISIT (OUTPATIENT)
Age: 85
End: 2025-06-02
Attending: INTERNAL MEDICINE
Payer: MEDICARE

## 2025-06-02 PROCEDURE — 93798 PHYS/QHP OP CAR RHAB W/ECG: CPT

## 2025-06-03 ENCOUNTER — APPOINTMENT (OUTPATIENT)
Age: 85
End: 2025-06-03
Attending: INTERNAL MEDICINE
Payer: MEDICARE

## 2025-06-04 ENCOUNTER — CARDPULM VISIT (OUTPATIENT)
Age: 85
End: 2025-06-04
Attending: INTERNAL MEDICINE
Payer: MEDICARE

## 2025-06-04 PROCEDURE — 93798 PHYS/QHP OP CAR RHAB W/ECG: CPT

## 2025-06-05 ENCOUNTER — APPOINTMENT (OUTPATIENT)
Age: 85
End: 2025-06-05
Attending: INTERNAL MEDICINE
Payer: MEDICARE

## 2025-06-06 ENCOUNTER — CARDPULM VISIT (OUTPATIENT)
Age: 85
End: 2025-06-06
Attending: INTERNAL MEDICINE
Payer: MEDICARE

## 2025-06-06 PROCEDURE — 93798 PHYS/QHP OP CAR RHAB W/ECG: CPT

## 2025-06-09 ENCOUNTER — CARDPULM VISIT (OUTPATIENT)
Age: 85
End: 2025-06-09
Attending: INTERNAL MEDICINE
Payer: MEDICARE

## 2025-06-09 PROCEDURE — 93798 PHYS/QHP OP CAR RHAB W/ECG: CPT

## 2025-06-10 ENCOUNTER — APPOINTMENT (OUTPATIENT)
Age: 85
End: 2025-06-10
Attending: INTERNAL MEDICINE
Payer: MEDICARE

## 2025-06-11 ENCOUNTER — APPOINTMENT (OUTPATIENT)
Age: 85
End: 2025-06-11
Attending: INTERNAL MEDICINE
Payer: MEDICARE

## 2025-06-12 ENCOUNTER — APPOINTMENT (OUTPATIENT)
Age: 85
End: 2025-06-12
Attending: INTERNAL MEDICINE
Payer: MEDICARE

## 2025-06-13 ENCOUNTER — CARDPULM VISIT (OUTPATIENT)
Age: 85
End: 2025-06-13
Attending: INTERNAL MEDICINE
Payer: MEDICARE

## 2025-06-13 PROCEDURE — 93798 PHYS/QHP OP CAR RHAB W/ECG: CPT

## 2025-06-16 ENCOUNTER — OFFICE VISIT (OUTPATIENT)
Age: 85
End: 2025-06-16
Attending: INTERNAL MEDICINE
Payer: MEDICARE

## 2025-06-16 ENCOUNTER — CARDPULM VISIT (OUTPATIENT)
Age: 85
End: 2025-06-16
Attending: INTERNAL MEDICINE
Payer: MEDICARE

## 2025-06-16 VITALS
TEMPERATURE: 97 F | HEIGHT: 64.02 IN | HEART RATE: 67 BPM | OXYGEN SATURATION: 98 % | WEIGHT: 139.19 LBS | RESPIRATION RATE: 16 BRPM | BODY MASS INDEX: 23.76 KG/M2 | DIASTOLIC BLOOD PRESSURE: 68 MMHG | SYSTOLIC BLOOD PRESSURE: 126 MMHG

## 2025-06-16 DIAGNOSIS — D75.89 MACROCYTOSIS: ICD-10-CM

## 2025-06-16 DIAGNOSIS — D63.8 ANEMIA OF CHRONIC DISEASE: Primary | ICD-10-CM

## 2025-06-16 DIAGNOSIS — M35.3 PMR (POLYMYALGIA RHEUMATICA) (HCC): ICD-10-CM

## 2025-06-16 LAB
ALBUMIN SERPL-MCNC: 4.4 G/DL (ref 3.2–4.8)
ALBUMIN/GLOB SERPL: 1.6 {RATIO} (ref 1–2)
ALP LIVER SERPL-CCNC: 60 U/L (ref 55–142)
ALT SERPL-CCNC: 23 U/L (ref 10–49)
ANION GAP SERPL CALC-SCNC: 8 MMOL/L (ref 0–18)
AST SERPL-CCNC: 28 U/L (ref ?–34)
BASOPHILS # BLD AUTO: 0.05 X10(3) UL (ref 0–0.2)
BASOPHILS NFR BLD AUTO: 0.8 %
BILIRUB SERPL-MCNC: 1.3 MG/DL (ref 0.2–1.1)
BUN BLD-MCNC: 25 MG/DL (ref 9–23)
CALCIUM BLD-MCNC: 9.3 MG/DL (ref 8.7–10.6)
CHLORIDE SERPL-SCNC: 104 MMOL/L (ref 98–112)
CO2 SERPL-SCNC: 27 MMOL/L (ref 21–32)
CREAT BLD-MCNC: 0.78 MG/DL (ref 0.55–1.02)
DEPRECATED HBV CORE AB SER IA-ACNC: 644 NG/ML (ref 50–306)
EGFRCR SERPLBLD CKD-EPI 2021: 75 ML/MIN/1.73M2 (ref 60–?)
EOSINOPHIL # BLD AUTO: 0.41 X10(3) UL (ref 0–0.7)
EOSINOPHIL NFR BLD AUTO: 6.5 %
ERYTHROCYTE [DISTWIDTH] IN BLOOD BY AUTOMATED COUNT: 25.9 %
GLOBULIN PLAS-MCNC: 2.7 G/DL (ref 2–3.5)
GLUCOSE BLD-MCNC: 111 MG/DL (ref 70–99)
HCT VFR BLD AUTO: 27.1 % (ref 35–48)
HGB BLD-MCNC: 8.8 G/DL (ref 12–16)
HGB RETIC QN AUTO: 27.9 PG (ref 28.2–36.6)
IMM GRANULOCYTES # BLD AUTO: 0.02 X10(3) UL (ref 0–1)
IMM GRANULOCYTES NFR BLD: 0.3 %
IMM RETICS NFR: 0.11 RATIO (ref 0.1–0.3)
IRON SATN MFR SERPL: 30 % (ref 15–50)
IRON SERPL-MCNC: 91 UG/DL (ref 50–170)
LYMPHOCYTES # BLD AUTO: 2.39 X10(3) UL (ref 1–4)
LYMPHOCYTES NFR BLD AUTO: 37.6 %
MCH RBC QN AUTO: 32.8 PG (ref 26–34)
MCHC RBC AUTO-ENTMCNC: 32.5 G/DL (ref 31–37)
MCV RBC AUTO: 101.1 FL (ref 80–100)
MONOCYTES # BLD AUTO: 0.63 X10(3) UL (ref 0.1–1)
MONOCYTES NFR BLD AUTO: 9.9 %
NEUTROPHILS # BLD AUTO: 2.85 X10 (3) UL (ref 1.5–7.7)
NEUTROPHILS # BLD AUTO: 2.85 X10(3) UL (ref 1.5–7.7)
NEUTROPHILS NFR BLD AUTO: 44.9 %
OSMOLALITY SERPL CALC.SUM OF ELEC: 293 MOSM/KG (ref 275–295)
PLATELET # BLD AUTO: 290 10(3)UL (ref 150–450)
PLATELET MORPHOLOGY: NORMAL
POTASSIUM SERPL-SCNC: 4.5 MMOL/L (ref 3.5–5.1)
PROT SERPL-MCNC: 7.1 G/DL (ref 5.7–8.2)
RBC # BLD AUTO: 2.68 X10(6)UL (ref 3.8–5.3)
RETICS # AUTO: 14.5 X10(3) UL (ref 22.5–147.5)
RETICS/RBC NFR AUTO: 0.5 % (ref 0.5–2.5)
SODIUM SERPL-SCNC: 139 MMOL/L (ref 136–145)
TOTAL IRON BINDING CAPACITY: 305 UG/DL (ref 250–425)
TRANSFERRIN SERPL-MCNC: 234 MG/DL (ref 250–380)
WBC # BLD AUTO: 6.4 X10(3) UL (ref 4–11)

## 2025-06-16 PROCEDURE — 93798 PHYS/QHP OP CAR RHAB W/ECG: CPT

## 2025-06-16 NOTE — PROGRESS NOTES
Hgb 8.8 today. Patient received procrit without difficulty. Requested apts for 3 weeks- pt will receive on [email or web address].     Education Record    Learner:  Patient    Disease / Diagnosis: anemia of chronic disease    Barriers / Limitations:  None   Comments:    Method:  Discussion   Comments:    General Topics:  Plan of care reviewed   Comments:    Outcome:  Shows understanding   Comments:

## 2025-06-16 NOTE — PROGRESS NOTES
Cancer Center Progress Note  Patient Name: Karolina Contreras   YOB: 1940   Medical Record Number: [id number]   CSN: [id number]   Attending Physician: Marco A Wilcox M.D.       Date of Visit: 6/16/25       Chief Complaint:  No chief complaint on file.       Oncologic History:  Karolina Contreras is a 84 year old female referred by Dr. Chowdhury for evaluation of her chronic anemia.  The patient reports that she has been anemic for quite some time. She has a long history of weakness and pain in the BLE.  She reports that she has trouble, at times, rising from a seated position.  She also reports that the joints of her fingers swell intermittently. She was seen by Rheumatology, Dr. Braun in the past and was diagnosed with Polymyalgia rheumatica.  She was treated with steroids and then MTX.  She reports that these medications made her feel \"bad all over\". She barnard in [place] and was seen by a rheumatologist there and told that she did not have PMR.  The medications were discontinued. She has had frequent elevations of her ESR, ranging from  in our system, but reports a normal ESR in [place]. Consultation at the [facility] was suggested, but the referral was declined.  She was seen by [facility] hematology in 2015.  The etiology of her anemia, at that time, was not clear. She had an elevated ferritin and was screened for hemochromatosis, but carries only one gene, which does not typically cause clinical hemochromatosis.  Observation was recommended and her hgb remained stable.  She was then seen by hematology in [place], and numerous blood tests, including an SPEP, B12, Folate, iron studies, Retic ct and haptoglobin were reportedly unrevealing.  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       No F/C/NS. No palpable LAD. No wt loss.      No family history of anemia.    She  was seen for an evaluation at the [facility] [place] [facility]. The hematologic diagnosis of anemia of chronic illness was confirmed. They agreed with not starting supplemental BHASKAR, as her hgb was 9.5 there.      History of Present Illness:  Pt is here for follow up. She complains of ongoing fatigue, but stable.     Performance Status:  ECOG 1    Past Medical History:  Past Medical History:    ANEMIA    Anemia    Anemia, unspecified    Anorexia    Anxiety    Arthritis    Back pain    Benign neoplasm of colon    Coronary atherosclerosis    Diarrhea, unspecified    Eye disease    Fatigue    Heart palpitations    Heart valve disease    History of cardiac murmur    History of depression    Lung nodule    Seen on [facility]     Normal cardiac stress test    at [facility]    Osteopenia    Mild.  Dexa scan 4/30/15. Repeat 2 years.    Other and unspecified hyperlipidemia    Other seborrheic keratosis    Pain in joints    Palpitations    PMR (polymyalgia rheumatica) (HCC)    Sicca syndrome (HCC)    Skin lesion    Sputum production    Stress    Unspecified essential hypertension    Wears glasses    Weight loss       Past Surgical History:  Past Surgical History:   Procedure Laterality Date    Colonoscopy  10/05/2006    rectal hyperplastic polyp, diverticulosis, hemorrhoids    Colonoscopy      Colonoscopy,diagnostic  11/29/2012    sigmoid polyp, diverticulosis    Other surgical history      Parotid duct divert,exc submand      Stent placemt retro carotid         Family History:  Family History   Problem Relation Age of Onset    Cancer Father 63        colon @63    Colon Cancer Father         66    Other (Other) Mother         osteoporosis    Other (Other) Sister         osteoporosis    Heart Disorder Maternal Grandmother         MI       Social History:  Social History     Socioeconomic History    Marital status:      Spouse name: Not on file    Number of children: Not on file    Years of education: Not on file    Highest education level:  Not on file   Occupational History    Not on file   Tobacco Use    Smoking status: Never    Smokeless tobacco: Never   Vaping Use    Vaping status: Never Used   Substance and Sexual Activity    Alcohol use: No     Alcohol/week: 0.0 standard drinks of alcohol    Drug use: No    Sexual activity: Not Currently   Other Topics Concern     Service Not Asked    Blood Transfusions Not Asked    Caffeine Concern Yes     Comment: Coffee Decaf    Occupational Exposure Not Asked    Hobby Hazards Not Asked    Sleep Concern Not Asked    Stress Concern Not Asked    Weight Concern Not Asked    Special Diet Not Asked    Back Care Not Asked    Exercise Yes    Bike Helmet Not Asked    Seat Belt Not Asked    Self-Exams Not Asked   Social History Narrative    : 1966    Children: 2, misscarriage times 2    Exercise: walks    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[employer]     Caffeine intake: 1-2 Greek coffee/d             Social Drivers of Health     Food Insecurity: No Food Insecurity (8/11/2021)    Received from [facility]    Hunger Vital Sign     Worried About Running Out of Food in the Last Year: Never true     Ran Out of Food in the Last Year: Never true     : Not on file     : Not on file     : Not on file     : Not on file   Transportation Needs: No Transportation Needs (8/11/2021)    Received from [facility]    PRAPARE - Transportation     Lack of Transportation (Medical): No     Lack of Transportation (Non-Medical): No     : Not on file     : Not on file     : Not on file     : Not on file   Housing Stability: Low Risk  (8/11/2021)    Received from [facility]    Housing Stability Vital Sign     Unable to Pay for Housing in the Last Year: No     In the last 12 months, how many places have you lived?: 1     In the last 12 months, was there a time when you did not have a steady place to sleep or slept in a shelter (including now)?: No       Current Medications:    Current Outpatient Medications:     ibuprofen 400 MG Oral Tab, Take 1  tablet (400 mg total) by mouth as needed for Pain., Disp: , Rfl:     Irbesartan 300 MG Oral Tab, Take 1 tablet (300 mg total) by mouth nightly., Disp: 90 tablet, Rfl: 3    escitalopram 10 MG Oral Tab, Take 1 tablet (10 mg total) by mouth daily., Disp: 90 tablet, Rfl: 3    clopidogrel 75 MG Oral Tab, Take 1 tablet (75 mg total) by mouth daily., Disp: 90 tablet, Rfl: 3    cycloSPORINE 0.05 % Ophthalmic Emulsion, Place 1 drop into both eyes every 12 (twelve) hours., Disp: , Rfl:     metoprolol succinate ER 25 MG Oral Tablet 24 Hr, Take 1 tablet (25 mg total) by mouth daily., Disp: 90 tablet, Rfl: 3    rosuvastatin 10 MG Oral Tab, Take 1 tablet (10 mg total) by mouth daily., Disp: 90 tablet, Rfl: 3    aspirin 81 MG Oral Tab EC, Take 1 tablet (81 mg total) by mouth daily., Disp: , Rfl: 0    magnesium 30 MG Oral Tab, Take 1 tablet (30 mg total) by mouth twice a week., Disp: , Rfl:     Ascorbic Acid (VITAMIN C) 1000 MG Oral Tab, Take 1 tablet (1,000 mg total) by mouth daily., Disp: , Rfl:     vitamin B-12 50 MCG Oral Tab, Take 1 tablet (50 mcg total) by mouth daily., Disp: , Rfl:     Multiple Vitamins-Minerals (OCUVITE EXTRA) Oral Tab, Take 2 tablets by mouth daily., Disp: , Rfl:     acetaminophen 325 MG Oral Tab, Take 1 tablet (325 mg total) by mouth every 6 (six) hours as needed for Pain., Disp: , Rfl:     Cholecalciferol (VITAMIN D) 1000 UNIT Oral Cap, Take 5,000 Units by mouth. Patient's daughter reports patient is taking Vitamin D 2000 units every other day. , Disp: , Rfl:     CENTRUM SILVER OR TABS, every other day, Disp: , Rfl:     Allergies:  No Known Allergies     Review of Systems:    Constitutional No fevers, chills, night sweats, excessive fatigue or weight loss.   Eyes No significant visual difficulties. No diplopia. No yellowing.   Hematologic/Lymphatic Normal - No easy bruising or bleeding.  No tender or palpable lymph nodes.   Respiratory No dyspnea, pleuritic chest pain, cough or hemoptysis.    Cardiovascular No anginal chest pain, palpitations or orthopnea.   Gastrointestinal No nausea, vomiting, diarrhea, GI bleeding, or constipation. NL appetite.   Genitorurinary  No hematuria, dysuria, abnormal bleeding, or incontinence.   Integumentary No rashes or yellowing of the skin   Neurologic No headache, blurred vision, and no areas of focal weakness. Normal gait.   Psychiatric No insomnia, depression, martin or mood swings.         Vital Signs:  /68 (BP Location: Left arm, Patient Position: Sitting, Cuff Size: adult)   Pulse 67   Temp 96.9 °F (36.1 °C) (Temporal)   Resp 16   Ht 1.626 m (5' 4.02\")   Wt 63.1 kg (139 lb 3.2 oz)   LMP  (LMP Unknown)   SpO2 98%   BMI 23.88 kg/m²     Physical Examination:    Constitutional Normal - Mood and affect appropriate. Appears close to chronological age. Well nourished. Well developed.   Eyes Normal - Conjunctivae and sclerae are clear and without icterus. Pupils are reactive and equal.   Hematologic/Lymphatic Normal - No petechiae or purpura.  No tender or palpable lymph nodes in the cervical, supraclavicular, axillary or inguinal area.   Respiratory Normal - Lungs are clear to auscultation, no wheezing.   Cardiovascular Normal - Regular rate and rhythm, no murmurs.   Abdomen Normal - Non-tender, non-distended, no masses, ascites or hepatosplenomegaly.    Extremities Normal - No cyanosis, clubbing or edema.    Integumentary Normal - No rashes and noJaundice   Neurologic Normal - No sensory or motor deficits, normal cerebellar function, normal gait, cranial nerves intact.   Psychiatric Normal - A&Ox3. Coherent speech. Verbalizes understanding of our discussions today.       Lab:  Recent Labs     06/16/25  1243   RBC 2.68 L   HGB 8.8 L   HCT 27.1 L   .1 H   MCH 32.8   MCHC 32.5   RDW 25.9   NEPRELIM 2.85   WBC 6.4   .0     Recent Labs     06/16/25  1243    H   BUN 25 H   CREATSERUM 0.78   CA 9.3   ALB 4.4      K 4.5      CO2  27.0   ALKPHO 60   AST 28   ALT 23   BILT 1.3 H   TP 7.1      Latest Reference Range & Units 06/16/25 12:43   Iron, Serum 50 - 170 ug/dL 91   Transferrin 250 - 380 mg/dL 234 (L)   Iron Bind.Cap.(TIBC) 250 - 425 ug/dL 305   Iron Saturation 15 - 50 % 30   FERRITIN 50 - 306 ng/mL 644 (H)   (L): Data is abnormally low  (H): Data is abnormally high    Radiology:    Pathology:    Impression and Plan:  Anemia of chronic illness: With steroid treatment, her hgb returned to 10-10.5, which has been her baseline for the past few years. 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 Her hgb drifted down to the point where she would benefit from Retacrit to improve her quality of life. Her iron stores are replete. She is tolerating 40,000 units q3 weeks with a hgb 8.5-9.5.    Macrocytosis: New. Check B12 and folate levels.     Polymyalgia rheumatica and Sjogrens: Management per rheumatology. She has been weaned from her steroids    Planned Follow Up:  3 months. Q3 week injection    The patient and I have a longitudinal relationship to address/treat this serious and complex condition      Electronically Signed by:    Marco A Wilcox M.D.  [facility]

## 2025-06-16 NOTE — PROGRESS NOTES
Pt here for follow up and injection.  She states she is tired.    Outpatient Oncology Care Plan  Problem list:  knowledge deficit    Problems related to:    disease/disease progression    Interventions:  provided general teaching    Expected outcomes:  understands plan of care    Progress towards outcome:  making progress    Education Record    Learner:  Patient  Barriers / Limitations:  None  Method:  Brief focused  Outcome:  Shows understanding  Comments:

## 2025-06-17 ENCOUNTER — APPOINTMENT (OUTPATIENT)
Age: 85
End: 2025-06-17
Attending: INTERNAL MEDICINE
Payer: MEDICARE

## 2025-06-18 ENCOUNTER — CARDPULM VISIT (OUTPATIENT)
Age: 85
End: 2025-06-18
Attending: INTERNAL MEDICINE
Payer: MEDICARE

## 2025-06-18 PROCEDURE — 93798 PHYS/QHP OP CAR RHAB W/ECG: CPT

## 2025-06-19 ENCOUNTER — APPOINTMENT (OUTPATIENT)
Age: 85
End: 2025-06-19
Attending: INTERNAL MEDICINE
Payer: MEDICARE

## 2025-06-20 ENCOUNTER — CARDPULM VISIT (OUTPATIENT)
Age: 85
End: 2025-06-20
Attending: INTERNAL MEDICINE
Payer: MEDICARE

## 2025-06-20 PROCEDURE — 93798 PHYS/QHP OP CAR RHAB W/ECG: CPT

## 2025-06-23 ENCOUNTER — CARDPULM VISIT (OUTPATIENT)
Age: 85
End: 2025-06-23
Attending: INTERNAL MEDICINE
Payer: MEDICARE

## 2025-06-23 PROCEDURE — 93798 PHYS/QHP OP CAR RHAB W/ECG: CPT

## 2025-06-24 ENCOUNTER — APPOINTMENT (OUTPATIENT)
Age: 85
End: 2025-06-24
Attending: INTERNAL MEDICINE
Payer: MEDICARE

## 2025-06-25 ENCOUNTER — CARDPULM VISIT (OUTPATIENT)
Age: 85
End: 2025-06-25
Attending: INTERNAL MEDICINE
Payer: MEDICARE

## 2025-06-25 PROCEDURE — 93798 PHYS/QHP OP CAR RHAB W/ECG: CPT

## 2025-06-25 RX ORDER — METOPROLOL SUCCINATE 25 MG/1
25 TABLET, EXTENDED RELEASE ORAL DAILY
Qty: 90 TABLET | Refills: 3 | Status: SHIPPED | OUTPATIENT
Start: 2025-06-25

## 2025-06-25 NOTE — TELEPHONE ENCOUNTER
Refill Per Protocol     Requested Prescriptions   Pending Prescriptions Disp Refills    METOPROLOL SUCCINATE ER 25 MG Oral Tablet 24 Hr [Pharmacy Med Name: METOPROLOL SUCCINATE ER TABS 25MG] 90 tablet 3     Sig: TAKE 1 TABLET DAILY       Hypertension Medications Protocol Passed - 6/25/2025 11:06 AM        Passed - CMP or BMP in past 12 months        Passed - Last BP reading less than 140/90     BP Readings from Last 1 Encounters:   06/16/25 126/68               Passed - In person appointment or virtual visit in the past 12 mos or appointment in next 3 mos     Recent Outpatient Visits              1 week ago Anemia of chronic disease    [facility]    Office Visit    1 week ago Anemia of chronic disease    [facility] Hematology Oncology [place] Marco A Wilcox MD    Office Visit    4 weeks ago Anemia of chronic disease    [facility]    Office Visit    1 month ago Anemia of chronic disease    [facility]    Office Visit    2 months ago Anemia of chronic disease    [facility]    Office Visit          Future Appointments         Provider Department Appt Notes    In 2 days  CARDIAC REHAB ROOM 1 Cardiopulmonary Rehabilitation, [facility] [address]  Dr. El purcell    In 5 days  CARDIAC REHAB ROOM 1 Cardiopulmonary Rehabilitation, [facility] [facility]  Dr. El purcell    In 6 days Ayad Chowdhury MD [facility], [address] [place] [address] 4 follow up  reschedule from 06/10/25    In 1 week  CARDIAC REHAB ROOM 1 Cardiopulmonary Rehabilitation, [facility] [place] [address]  Dr. El purcell    In 1 week NP [place] RN6 [facility] [address], poss [employer] - get from pharmacy    In 1 month Ilan Murphy DO [facility], [address] [place] [address] follow up                    Passed - EGFRCR or GFRNAA > 50     GFR Evaluation  EGFRCR: 75 , resulted on 6/16/2025          Passed - Medication is active on med list

## 2025-06-26 ENCOUNTER — APPOINTMENT (OUTPATIENT)
Age: 85
End: 2025-06-26
Attending: INTERNAL MEDICINE
Payer: MEDICARE

## 2025-06-27 ENCOUNTER — CARDPULM VISIT (OUTPATIENT)
Age: 85
End: 2025-06-27
Attending: INTERNAL MEDICINE
Payer: MEDICARE

## 2025-06-27 PROCEDURE — 93798 PHYS/QHP OP CAR RHAB W/ECG: CPT

## 2025-06-30 ENCOUNTER — APPOINTMENT (OUTPATIENT)
Age: 85
End: 2025-06-30
Attending: INTERNAL MEDICINE
Payer: MEDICARE

## 2025-07-01 ENCOUNTER — OFFICE VISIT (OUTPATIENT)
Dept: INTERNAL MEDICINE CLINIC | Facility: CLINIC | Age: 85
End: 2025-07-01
Payer: MEDICARE

## 2025-07-01 ENCOUNTER — APPOINTMENT (OUTPATIENT)
Age: 85
End: 2025-07-01
Attending: INTERNAL MEDICINE

## 2025-07-01 VITALS
BODY MASS INDEX: 23.84 KG/M2 | WEIGHT: 139.63 LBS | HEART RATE: 65 BPM | TEMPERATURE: 97 F | HEIGHT: 64.02 IN | DIASTOLIC BLOOD PRESSURE: 64 MMHG | SYSTOLIC BLOOD PRESSURE: 120 MMHG | RESPIRATION RATE: 16 BRPM | OXYGEN SATURATION: 96 %

## 2025-07-01 DIAGNOSIS — F41.9 ANXIETY: ICD-10-CM

## 2025-07-01 DIAGNOSIS — I10 PRIMARY HYPERTENSION: Primary | ICD-10-CM

## 2025-07-01 PROCEDURE — 1159F MED LIST DOCD IN RCRD: CPT | Performed by: INTERNAL MEDICINE

## 2025-07-01 PROCEDURE — 99214 OFFICE O/P EST MOD 30 MIN: CPT | Performed by: INTERNAL MEDICINE

## 2025-07-02 ENCOUNTER — APPOINTMENT (OUTPATIENT)
Age: 85
End: 2025-07-02
Attending: INTERNAL MEDICINE

## 2025-07-03 ENCOUNTER — APPOINTMENT (OUTPATIENT)
Age: 85
End: 2025-07-03
Attending: INTERNAL MEDICINE

## 2025-07-06 NOTE — PROGRESS NOTES
Subjective:   Patient ID: Karolina Contreras is a 84 year old female.    HPI  Here for htn, anxiety, feels well, states anciety is well controlled, reviewed specialist visits, discussed shingrix, recomended  History/Other:   Review of Systems   Constitutional:  Negative for fever.   Respiratory:  Negative for shortness of breath.    Cardiovascular:  Negative for chest pain.   Endocrine: Negative for polyuria.   Neurological:  Negative for headaches.     Current Medications[1]  Allergies:Allergies[2]    Objective:   Physical Exam  Constitutional:       Appearance: Normal appearance.   Cardiovascular:      Rate and Rhythm: Regular rhythm.      Heart sounds: Normal heart sounds.   Pulmonary:      Breath sounds: Normal breath sounds.   Skin:     General: Skin is warm.   Neurological:      Mental Status: She is alert and oriented to person, place, and time.         Assessment & Plan:   1. Primary hypertension -bp controlled, cont arb, fu in feb for pe   2. Anxiety -cont ssri, anxiety is well contrrolled, cont, fu in feb       No orders of the defined types were placed in this encounter.      Meds This Visit:  Requested Prescriptions      No prescriptions requested or ordered in this encounter       Imaging & Referrals:  None         [1]   Current Outpatient Medications   Medication Sig Dispense Refill    metoprolol succinate ER 25 MG Oral Tablet 24 Hr Take 1 tablet (25 mg total) by mouth daily. 90 tablet 3    ibuprofen 400 MG Oral Tab Take 1 tablet (400 mg total) by mouth as needed for Pain.      Irbesartan 300 MG Oral Tab Take 1 tablet (300 mg total) by mouth nightly. 90 tablet 3    escitalopram 10 MG Oral Tab Take 1 tablet (10 mg total) by mouth daily. 90 tablet 3    clopidogrel 75 MG Oral Tab Take 1 tablet (75 mg total) by mouth daily. 90 tablet 3    cycloSPORINE 0.05 % Ophthalmic Emulsion Place 1 drop into both eyes every 12 (twelve) hours.      rosuvastatin 10 MG Oral Tab Take 1 tablet (10 mg total) by mouth  daily. 90 tablet 3    aspirin 81 MG Oral Tab EC Take 1 tablet (81 mg total) by mouth in the morning.  0    magnesium 30 MG Oral Tab Take 1 tablet (30 mg total) by mouth twice a week.      Ascorbic Acid (VITAMIN C) 1000 MG Oral Tab Take 1 tablet (1,000 mg total) by mouth in the morning.      vitamin B-12 50 MCG Oral Tab Take 1 tablet (50 mcg total) by mouth in the morning.      Multiple Vitamins-Minerals (OCUVITE EXTRA) Oral Tab Take 2 tablets by mouth in the morning.      acetaminophen 325 MG Oral Tab Take 1 tablet (325 mg total) by mouth every 6 (six) hours as needed for Pain.      Cholecalciferol (VITAMIN D) 1000 UNIT Oral Cap Take 5,000 Units by mouth. Patient's daughter reports patient is taking Vitamin D 2000 units every other day.      CENTRUM SILVER OR TABS      [2] No Known Allergies

## 2025-07-07 ENCOUNTER — OFFICE VISIT (OUTPATIENT)
Age: 85
End: 2025-07-07
Attending: INTERNAL MEDICINE
Payer: MEDICARE

## 2025-07-07 VITALS
WEIGHT: 138 LBS | OXYGEN SATURATION: 94 % | HEART RATE: 67 BPM | RESPIRATION RATE: 16 BRPM | BODY MASS INDEX: 23.56 KG/M2 | HEIGHT: 64.02 IN | TEMPERATURE: 98 F | DIASTOLIC BLOOD PRESSURE: 63 MMHG | SYSTOLIC BLOOD PRESSURE: 166 MMHG

## 2025-07-07 DIAGNOSIS — D63.8 ANEMIA OF CHRONIC DISEASE: Primary | ICD-10-CM

## 2025-07-07 LAB — HGB BLD-MCNC: 8.6 G/DL (ref 12–16)

## 2025-07-07 NOTE — PROGRESS NOTES
Education Record    Learner:  Patient    Disease / Diagnosis: Anemia     Barriers / Limitations:  None   Comments:    Method:  Discussion   Comments:    General Topics:  Plan of care reviewed   Comments:    Outcome:  Shows understanding   Comments:    Patient arrives ambulatory for labs and possible procrit injection. Hgb @ 8.6 - procrit injection given per MD orders. Future appointments requested and patient left in stable condition.

## 2025-07-08 ENCOUNTER — APPOINTMENT (OUTPATIENT)
Age: 85
End: 2025-07-08
Attending: INTERNAL MEDICINE

## 2025-07-10 ENCOUNTER — APPOINTMENT (OUTPATIENT)
Age: 85
End: 2025-07-10
Attending: INTERNAL MEDICINE

## 2025-07-15 ENCOUNTER — APPOINTMENT (OUTPATIENT)
Age: 85
End: 2025-07-15
Attending: INTERNAL MEDICINE

## 2025-07-17 ENCOUNTER — APPOINTMENT (OUTPATIENT)
Age: 85
End: 2025-07-17
Attending: INTERNAL MEDICINE

## 2025-07-28 ENCOUNTER — OFFICE VISIT (OUTPATIENT)
Facility: LOCATION | Age: 85
End: 2025-07-28
Attending: INTERNAL MEDICINE
Payer: MEDICARE

## 2025-07-28 VITALS
TEMPERATURE: 98 F | SYSTOLIC BLOOD PRESSURE: 128 MMHG | HEART RATE: 56 BPM | OXYGEN SATURATION: 95 % | DIASTOLIC BLOOD PRESSURE: 55 MMHG | RESPIRATION RATE: 16 BRPM

## 2025-07-28 DIAGNOSIS — D63.8 ANEMIA OF CHRONIC DISEASE: Primary | ICD-10-CM

## 2025-07-28 LAB — HGB BLD-MCNC: 8.6 G/DL (ref 12–16)

## 2025-07-28 NOTE — PROGRESS NOTES
Education Record    Learner:  Patient    Disease / Diagnosis: anemia    Barriers / Limitations:  None   Comments:    Method:  Discussion   Comments:    General Topics:  Plan of care reviewed   Comments:    Outcome:  Shows understanding   Comments:      Pt received procrit for Hgb 8.6 today. Tolerated without difficulty or complaint. Scheduled to return in 3 weeks.

## 2025-08-18 ENCOUNTER — OFFICE VISIT (OUTPATIENT)
Facility: LOCATION | Age: 85
End: 2025-08-18
Attending: INTERNAL MEDICINE

## 2025-08-18 VITALS
HEART RATE: 70 BPM | RESPIRATION RATE: 18 BRPM | DIASTOLIC BLOOD PRESSURE: 84 MMHG | SYSTOLIC BLOOD PRESSURE: 124 MMHG | TEMPERATURE: 98 F | OXYGEN SATURATION: 96 %

## 2025-08-18 DIAGNOSIS — D63.8 ANEMIA OF CHRONIC DISEASE: Primary | ICD-10-CM

## 2025-08-18 LAB — HGB BLD-MCNC: 8.6 G/DL (ref 12–16)

## (undated) DIAGNOSIS — D63.8 ANEMIA OF CHRONIC DISEASE: Primary | ICD-10-CM

## (undated) NOTE — LETTER
September 30, 2019    [facility] MELI Ben  [address] [place] [id number]    Dear [facility]: It was a pleasure speaking with you over the phone recently.  To follow up, I wanted to send you some contact information to utilize when you

## (undated) NOTE — Clinical Note
Hi, Dr. Hayde Ozuna! Thank you for referring Ms. Karolina Contreras for rheumatologic evaluation. Please see the discussion portion of my note and let me know if you have any questions.  SHELLY Eller Rheumatology6/12/2020

## (undated) NOTE — LETTER
06/18/21        Karolina Contreras  [address] [facility]  [place] [address]      Dear Jama Murguia records indicate that you have outstanding lab work and or testing that was ordered for you and has not yet been completed:  Orders Plac

## (undated) NOTE — MR AVS SNAPSHOT
[facility] [address] [phone]               Thank you for choosing us for your health care visit with Rodrigo Alcantar MD.  We are glad to serve you and happy to provide you with this summ 110/50 mmHg 60 97.8 °F (36.6 °C) (Oral) 65\" 172 lb 28.62 kg/m2         Current Medications          This list is accurate as of: 5/22/17  9:57 AM.  Always use your most recent med list.                Rudie Post OR   Take by mouth.            CENTRUM SILVER Ta Eat plenty of low-fat dairy products High fat meats and dairy   Choose whole grain products Foods high in sodium   Water is best for hydration Fast food.    Eat at home when possible     Tips for increasing your physical activity – Adults who are physically

## (undated) NOTE — LETTER
November 20, 2020    Feng Contreras  [address]  [place] [address]      Dear Camila Mohs:    The following are the results of your recent tests ordered by [id number]. Please review the list of test results.   Your result is t .0 150.0 - 450.0 10(3)uL    MCV 99.3 80.0 - 100.0 fL    MCH 32.5 26.0 - 34.0 pg    MCHC 32.8 31.0 - 37.0 g/dL    RDW 23.1 (H) 11.0 - 15.0 %    RDW-SD 84.3 (H) 35.1 - 46.3 fL    Neutrophil Absolute Prelim 2.78 1.50 - 7.70 x10 (3) uL    Neutrophil Ab

## (undated) NOTE — Clinical Note
06/21/2017        Karolina Contreras  [address] [facility]  [place] [address]      Dear Rohan Bryan records indicate that you have outstanding lab work and or testing that was ordered for you and has not yet been completed:      Charla Abarca